# Patient Record
Sex: FEMALE | HISPANIC OR LATINO | Employment: OTHER | URBAN - METROPOLITAN AREA
[De-identification: names, ages, dates, MRNs, and addresses within clinical notes are randomized per-mention and may not be internally consistent; named-entity substitution may affect disease eponyms.]

---

## 2016-08-01 LAB — PAP SMEAR - HIM PATIENT REPORTED: NEGATIVE

## 2017-01-11 ENCOUNTER — OFFICE VISIT (OUTPATIENT)
Dept: CARDIOLOGY | Facility: CLINIC | Age: 57
End: 2017-01-11
Payer: COMMERCIAL

## 2017-01-11 VITALS
HEART RATE: 72 BPM | DIASTOLIC BLOOD PRESSURE: 72 MMHG | SYSTOLIC BLOOD PRESSURE: 134 MMHG | HEIGHT: 63 IN | BODY MASS INDEX: 34.55 KG/M2 | WEIGHT: 195 LBS

## 2017-01-11 DIAGNOSIS — I10 BENIGN ESSENTIAL HYPERTENSION: Primary | ICD-10-CM

## 2017-01-11 PROCEDURE — 99214 OFFICE O/P EST MOD 30 MIN: CPT | Performed by: NURSE PRACTITIONER

## 2017-01-11 RX ORDER — CANDESARTAN 32 MG/1
32 TABLET ORAL DAILY
Qty: 90 TABLET | Refills: 3 | Status: SHIPPED | OUTPATIENT
Start: 2017-01-11 | End: 2018-01-22

## 2017-01-11 NOTE — Clinical Note
1/11/2017    Dilan Soto MD  [place] [id number] [facility] [address]    RE: Theresa M Delosreyes       Dear Colleague,    I had the pleasure of seeing Theresa Delosreyes and her  in Cardiology Clinic today.  She is a pleasant 56-year-old patient of Dr. Salguero's with a history of HER-2 positive breast cancer.  She received her last dose of Herceptin on 11/02/2016.  She has been following in our Cardio-Oncology Clinic and fortunately has not had any evidence of cardiotoxicity.  She is being maintained on Femara for 5 years.      Her cardiac medications include atenolol more recently lisinopril which was switched after she developed a cough to candesartan.  Her last echocardiogram in October of last year showed normal LV size, wall thickness and ejection fraction and no significant valvular disease and no change compared to the previous echo.  She underwent reconstructive surgery in November and switched to candesartan following that surgery.      Since switching to candesartan her cough has improved.  Both she and her  both notice that she is feeling well.  They are traveling to [facility] next week.  She denies any chest pain, PND, orthopnea or dyspnea.      We did repeat her BMP after starting candesartan.  This showed a sodium of 142, potassium 3.9 and creatinine of 0.86.      PHYSICAL EXAMINATION:   GENERAL:  A 56-year-old woman who appears comfortable.   VITAL SIGNS:  Blood pressure 134/72, heart rate 72, weight 195 pounds, BMI 34.   LUNGS:  Clear throughout auscultation.   CARDIOVASCULAR:  Rate regular, normal S1, S2, no carotid bruits.   EXTREMITIES:  No lower extremity edema.     Outpatient Encounter Prescriptions as of 1/11/2017   Medication Sig Dispense Refill     candesartan 32 MG TABS Take 32 mg by mouth daily 90 tablet 3     VITAMIN D, CHOLECALCIFEROL, PO Take 3,000 Units by mouth daily       atenolol (TENORMIN) 100 MG tablet Take 1 tablet (100 mg) by mouth At  Bedtime 90 tablet 3     letrozole (FEMARA) 2.5 MG tablet Take 2.5 mg by mouth At Bedtime   0     ACETAMINOPHEN PO Take 1,500 mg by mouth daily as needed for pain       [DISCONTINUED] candesartan (ATACAND) 16 MG tablet Take 1 tablet (16 mg) by mouth daily 90 tablet 3     No facility-administered encounter medications on file as of 1/11/2017.      ASSESSMENT AND PLAN:   1.  History of breast cancer, HER-2 positive, treated with Taxol and Herceptin.  Her last dose of Herceptin  was 11/02/2016, she had reconstructive breast surgery following that.  Fortunately, her echocardiograms remain stable with a stable LV function on atenolol and more recently she has done well on candesartan.   2.  Hypertension.  Her blood pressure today was 134/72.  She has not checked it very much, but she saw her primary back in December it was 122/70.  Today, I continued her on her atenolol 100 mg daily and increased her candesartan from 16 mg daily to 32 mg daily for better blood pressure control.  My goal blood pressure for her would be less than 120/80.  She will monitor her blood pressure on her home machine over the next 4-6 weeks and call my office and let me know how her blood pressure is after that.  If it is controlled at less than 120/80 the majority of the time then I would make no change and just have her see Dr. Salguero in April or May with an echocardiogram.  If it is still elevated, she may need a third agent.      Thank you for allowing me to see Melina today.     Sincerely,    TERE Merino Cox [place]

## 2017-01-11 NOTE — PATIENT INSTRUCTIONS
Lets increase your candesartan from 16 mg daily to 32 mg daily.    In the next 7-14 days we will repeat a BMP to look at your kidney function and electrolytes.    Monitor your blood pressure at home. If we can get it under 120/80 consistently that would be great. In a month call my nurse and let her know what your BP range is, if it isn't controlled, then I should see you to talk about adding another medication.    See Dr. Salguero in May with an echo    Suzy  [phone]

## 2017-01-11 NOTE — PROGRESS NOTES
HPI and Plan:   See dictation    Orders Placed This Encounter   Procedures     Basic metabolic panel       Orders Placed This Encounter   Medications     candesartan 32 MG TABS     Sig: Take 32 mg by mouth daily     Dispense:  90 tablet     Refill:  3       Medications Discontinued During This Encounter   Medication Reason     candesartan (ATACAND) 16 MG tablet Reorder         Encounter Diagnosis   Name Primary?     Benign essential hypertension Yes       CURRENT MEDICATIONS:  Current Outpatient Prescriptions   Medication Sig Dispense Refill     candesartan 32 MG TABS Take 32 mg by mouth daily 90 tablet 3     VITAMIN D, CHOLECALCIFEROL, PO Take 3,000 Units by mouth daily       atenolol (TENORMIN) 100 MG tablet Take 1 tablet (100 mg) by mouth At Bedtime 90 tablet 3     letrozole (FEMARA) 2.5 MG tablet Take 2.5 mg by mouth At Bedtime   0     ACETAMINOPHEN PO Take 1,500 mg by mouth daily as needed for pain       [DISCONTINUED] candesartan (ATACAND) 16 MG tablet Take 1 tablet (16 mg) by mouth daily 90 tablet 3       ALLERGIES     Allergies   Allergen Reactions     Definity Other (See Comments)     A substance that helps with imaging the heart muscle with echocardiology    Full body ache starting in the low back.      Keflex [Cephalexin]      Hand swelling, blisters???       PAST MEDICAL HISTORY:  Past Medical History   Diagnosis Date     Supervision of other normal pregnancy       - vaginal     Status post laparoscopic cholecystectomy 2005     Cholelithiasis with mild subacute hemorrhagic cholecystitis     Female stress incontinence 2006     s/p sling procedure     Mixed hyperlipidemia      Osteoarthritis of both knees      Obesity (BMI 30-39.9)      Benign hypertension      started medication 3/13     BRIT (obstructive sleep apnea)      not using CPAP, treated with tonsilliectomy     Chest pain      side effect of Femera     Cancer (H)      breast cancer     PONV (postoperative nausea and  "vomiting)      Motion sickness      Uncomplicated asthma      with respiratory infections       PAST SURGICAL HISTORY:  Past Surgical History   Procedure Laterality Date     Tonsillectomy  2001     D & c  9/2004     Hc ablation, endometrial, thermal, w/o hysteroscopic guidance  11/2004     NovaSure endometrial ablation.      Laparoscopic cholecystectomy with cholangiograms  1/2005     Laparoscopic cholecystectomy with intraoperative  cholangiogram     Hc sling operation for stress incontinence  1/2007     Monarc sling procedure     Colonoscopy  5/2006     ormal to lt transverse colon, incomplete due to \"extremely redundant colon\"\"     Hc colon air contrast  5/2006     normal     Extract lens clear, exchange lens refractive, combined  3/2013     bilateral     Mastectomy simple bilateral, sentinel node bilateral, combined Bilateral 9/29/2015     Procedure: COMBINED MASTECTOMY SIMPLE BILATERAL, SENTINEL NODE BILATERAL;  Surgeon: August Rdz MD;  Location:  OR     Insert tissue expander breast bilateral Bilateral 9/29/2015     Procedure: INSERT TISSUE EXPANDER BREAST BILATERAL;  Surgeon: Alhaji Patton MD;  Location:  OR     Remove tissue expander breast Bilateral 4/12/2016     Procedure: REMOVE TISSUE EXPANDER BREAST;  Surgeon: Alhaji Patton MD;  Location:  OR     Graft free vascularized transverse rectus abdominis myocutaneous Bilateral 4/12/2016     Procedure: GRAFT FREE VASCULARIZED TRANSVERSE RECTUS ABDOMINIS MYOCUTANEOUS;  Surgeon: Alhaji Patton MD;  Location:  OR     Insert port vascular access Left 11/9/2015     Procedure: INSERT PORT VASCULAR ACCESS;  Surgeon: August Rdz MD;  Location: [facility]     Vascular surgery       insert port and removed     Remove port vascular access N/A 11/18/2016     Procedure: REMOVE PORT VASCULAR ACCESS;  Surgeon: Alhaji Patton MD;  Location:  SD     Reconstruct breast bilateral Bilateral 11/18/2016     Procedure: " RECONSTRUCT BREAST BILATERAL;  Surgeon: Alhaji Patton MD;  Location: [facility]     Revise scar trunk N/A 11/18/2016     Procedure: REVISE SCAR TRUNK;  Surgeon: Alhaji Patton MD;  Location: [facility]     Graft fat to breast Bilateral 11/18/2016     Procedure: GRAFT FAT TO BREAST;  Surgeon: Alhaji Patton MD;  Location: [facility]       FAMILY HISTORY:  Family History   Problem Relation Age of Onset     Asthma Daughter      C.A.D. Father 40     MI     CEREBROVASCULAR DISEASE Father      DIABETES Maternal Aunt      Hypertension Mother      Breast Cancer Mother      Thyroid Disease Mother      hypothyroidism     Cancer - colorectal Maternal Uncle      DIABETES Maternal Aunt      with retinopathy leading to blindness     Breast Cancer Sister      Hypertension Sister      Hypertension Brother      HEART DISEASE Brother      pacemaker     Hypertension Brother      DIABETES Brother      type 2     Myocardial Infarction Maternal Grandmother      DIABETES Paternal Grandmother      Unknown/Adopted Maternal Grandfather      Unknown/Adopted Paternal Grandfather        SOCIAL HISTORY:  Social History     Social History     Marital Status:      Spouse Name: N/A     Number of Children: 2     Years of Education: 12     Occupational History           Social History Main Topics     Smoking status: Never Smoker      Smokeless tobacco: Never Used     Alcohol Use: 0.0 oz/week     0 Standard drinks or equivalent per week      Comment: 2 per year     Drug Use: No     Sexual Activity:     Partners: Male     Birth Control/ Protection: Surgical     Other Topics Concern     Blood Transfusions No     Caffeine Concern No     1 cup coffee     Sleep Concern Yes     Sleep apnea, does not wear CPAP     Stress Concern No     Weight Concern Yes     weight fluctuates     Special Diet No     Exercise No     Seat Belt Yes     Self-Exams Yes     Social History Narrative    The patient does not eat fruits  "every day, but does eat vegetables every day. Her calcium and vitamin D intake is low. This was discussed.       Review of Systems:  Skin:  Negative       Eyes:  Positive for contacts contacts implaned in both eyes  ENT:  Negative      Respiratory:  Positive for sleep apnea;cough sleep with HOB elevated, doesn't wear cpap, has URI, on abx   Cardiovascular:    edema;Positive for swelling in R knee  Gastroenterology: Negative      Genitourinary:  Negative      Musculoskeletal:  Positive for   R knee pain  Neurologic:  Negative      Psychiatric:  Negative      Heme/Lymph/Imm:  Positive for allergies    Endocrine:  Negative        Physical Exam:  Vitals: /72 mmHg  Pulse 72  Ht 1.6 m (5' 3\")  Wt 88.451 kg (195 lb)  BMI 34.55 kg/m2  LMP 02/23/2013    Constitutional:  cooperative;alert and oriented;in no acute distress        Skin:  warm and dry to the touch        Head:  normocephalic        Eyes:  pupils equal and round        ENT:  not assessed this visit        Neck:  JVP normal;no stiffness;no carotid bruit        Chest:  clear to auscultation;normal symmetry;no tenderness to palpation;normal respiratory excursion          Cardiac: regular rhythm;normal S1 and S2                  Abdomen:  not assessed this visit        Vascular: not assessed this visit                                        Extremities and Back:  no edema              Neurological:  affect appropriate, oriented to time, person and place;no gross motor deficits              CC  No referring provider defined for this encounter.                "

## 2017-01-11 NOTE — MR AVS SNAPSHOT
After Visit Summary   1/11/2017    Theresa M Delosreyes    MRN: [id number]           Patient Information     Date Of Birth          1960        Visit Information        Provider Department      1/11/2017 3:50 PM Sarah Lowry APRN CNP [facility]        Today's Diagnoses     Benign essential hypertension    -  1       Care Instructions    Lets increase your candesartan from 16 mg daily to 32 mg daily.    In the next 7-14 days we will repeat a BMP to look at your kidney function and electrolytes.    Monitor your blood pressure at home. If we can get it under 120/80 consistently that would be great. In a month call my nurse and let her know what your BP range is, if it isn't controlled, then I should see you to talk about adding another medication.    See Dr. Salguero in May with an echo    Suzy  [phone]            Follow-ups after your visit        Future tests that were ordered for you today     Open Future Orders        Priority Expected Expires Ordered    Basic metabolic panel Routine 2/10/2017 1/11/2018 1/11/2017            Who to contact     If you have questions or need follow up information about today's clinic visit or your schedule please contact [facility] directly at [phone].  Normal or non-critical lab and imaging results will be communicated to you by MyChart, letter or phone within 4 business days after the clinic has received the results. If you do not hear from us within 7 days, please contact the clinic through [employer] or phone. If you have a critical or abnormal lab result, we will notify you by phone as soon as possible.  Submit refill requests through [employer] or call your pharmacy and they will forward the refill request to us. Please allow 3 business days for your refill to be completed.          Additional Information About Your Visit        MyChart Information     [employer]  "gives you secure access to your electronic health record. If you see a primary care provider, you can also send messages to your care team and make appointments. If you have questions, please call your primary care clinic.  If you do not have a primary care provider, please call [phone] and they will assist you.        Care EveryWhere ID     This is your Care EveryWhere ID. This could be used by other organizations to access your [facility] medical records  [id number]        Your Vitals Were     Pulse Height BMI (Body Mass Index) Last Period          72 1.6 m (5' 3\") 34.55 kg/m2 02/23/2013         Blood Pressure from Last 3 Encounters:   01/11/17 134/72   12/20/16 122/70   11/18/16 142/77    Weight from Last 3 Encounters:   01/11/17 88.451 kg (195 lb)   12/20/16 86.637 kg (191 lb)   11/18/16 85.775 kg (189 lb 1.6 oz)                 Today's Medication Changes          These changes are accurate as of: 1/11/17  4:34 PM.  If you have any questions, ask your nurse or doctor.               These medicines have changed or have updated prescriptions.        Dose/Directions    candesartan cilexetil 32 MG Tabs   This may have changed:    - medication strength  - how much to take   Used for:  Benign essential hypertension   Changed by:  Sarah Lowry APRN CNP        Dose:  32 mg   Take 32 mg by mouth daily   Quantity:  90 tablet   Refills:  3            Where to get your medicines      [id number] - [place] [address] [facility] [address]     Phone:  [phone]    - candesartan cilexetil 32 MG Tabs             Primary Care Provider Office Phone # Fax #    Dilan Soto [phone]       [address] [address]        Thank you!     Thank you for choosing [facility] [place]  for your care. Our goal is always to provide you with excellent " care. Hearing back from our patients is one way we can continue to improve our services. Please take a few minutes to complete the written survey that you may receive in the mail after your visit with us. Thank you!             Your Updated Medication List - Protect others around you: Learn how to safely use, store and throw away your medicines at www.disposemymeds.org.          This list is accurate as of: 1/11/17  4:34 PM.  Always use your most recent med list.                   Brand Name Dispense Instructions for use    ACETAMINOPHEN PO      Take 1,500 mg by mouth daily as needed for pain       atenolol 100 MG tablet    TENORMIN    90 tablet    Take 1 tablet (100 mg) by mouth At Bedtime       candesartan cilexetil 32 MG Tabs     90 tablet    Take 32 mg by mouth daily       letrozole 2.5 MG tablet    FEMARA     Take 2.5 mg by mouth At Bedtime       VITAMIN D (CHOLECALCIFEROL) PO      Take 3,000 Units by mouth daily

## 2017-01-12 ENCOUNTER — TRANSFERRED RECORDS (OUTPATIENT)
Dept: HEALTH INFORMATION MANAGEMENT | Facility: CLINIC | Age: 57
End: 2017-01-12

## 2017-01-12 NOTE — PROGRESS NOTES
HISTORY OF PRESENT ILLNESS:  I had the pleasure of seeing Theresa Delosreyes and her  in Cardiology Clinic today.  She is a pleasant 56-year-old patient of Dr. Amanda with a history of HER-2 positive breast cancer.  She received her last dose of Herceptin on 11/02/2016.  She has been following in our Cardio-Oncology Clinic and fortunately has not had any evidence of cardiotoxicity.  She is being maintained on Femara for 5 years.      Her cardiac medications include atenolol more recently lisinopril which was switched after she developed a cough to candesartan.  Her last echocardiogram in October of last year showed normal LV size, wall thickness and ejection fraction and no significant valvular disease and no change compared to the previous echo.  She underwent reconstructive surgery in November and switched to candesartan following that surgery.      Since switching to candesartan her cough has improved.  Both she and her  both notice that she is feeling well.  They are traveling to [facility] next week.  She denies any chest pain, PND, orthopnea or dyspnea.      We did repeat her BMP after starting candesartan.  This showed a sodium of 142, potassium 3.9 and creatinine of 0.86.      PHYSICAL EXAMINATION:   GENERAL:  A 56-year-old woman who appears comfortable.   VITAL SIGNS:  Blood pressure 134/72, heart rate 72, weight 195 pounds, BMI 34.   LUNGS:  Clear throughout auscultation.   CARDIOVASCULAR:  Rate regular, normal S1, S2, no carotid bruits.   EXTREMITIES:  No lower extremity edema.      ASSESSMENT AND PLAN:   1.  History of breast cancer, HER-2 positive, treated with Taxol and Herceptin.  Her last dose of Herceptin  was 11/02/2016, she had reconstructive breast surgery following that.  Fortunately, her echocardiograms remain stable with a stable LV function on atenolol and more recently she has done well on candesartan.   2.  Hypertension.  Her blood pressure today was 134/72.  She has not checked it  very much, but she saw her primary back in December it was 122/70.  Today, I continued her on her atenolol 100 mg daily and increased her candesartan from 16 mg daily to 32 mg daily for better blood pressure control.  My goal blood pressure for her would be less than 120/80.  She will monitor her blood pressure on her home machine over the next 4-6 weeks and call my office and let me know how her blood pressure is after that.  If it is controlled at less than 120/80 the majority of the time then I would make no change and just have her see Dr. Salguero in April or May with an echocardiogram.  If it is still elevated, she may need a third agent.      Thank you for allowing me to see John today.      TERE Valera CNP         D: 2017 16:50   T: 2017 09:26   MT: YAA      Name:     DELOSREYES, THERESA   MRN:      [id number]        Account:      [id number]   :      1960           Service Date: 2017      Document: [id number]

## 2017-02-15 DIAGNOSIS — I10 BENIGN ESSENTIAL HYPERTENSION: ICD-10-CM

## 2017-02-15 LAB
ANION GAP SERPL CALCULATED.3IONS-SCNC: 11.3 MMOL/L (ref 6–17)
BUN SERPL-MCNC: 11 MG/DL (ref 7–30)
CALCIUM SERPL-MCNC: 9.2 MG/DL (ref 8.5–10.5)
CHLORIDE SERPL-SCNC: 101 MMOL/L (ref 98–107)
CO2 SERPL-SCNC: 32 MMOL/L (ref 23–29)
CREAT SERPL-MCNC: 0.8 MG/DL (ref 0.7–1.3)
GFR SERPL CREATININE-BSD FRML MDRD: 74 ML/MIN/1.7M2
GLUCOSE SERPL-MCNC: 117 MG/DL (ref 70–105)
POTASSIUM SERPL-SCNC: 4.3 MMOL/L (ref 3.5–5.1)
SODIUM SERPL-SCNC: 140 MMOL/L (ref 136–145)

## 2017-02-15 PROCEDURE — 80048 BASIC METABOLIC PNL TOTAL CA: CPT | Performed by: INTERNAL MEDICINE

## 2017-02-15 PROCEDURE — 36415 COLL VENOUS BLD VENIPUNCTURE: CPT | Performed by: INTERNAL MEDICINE

## 2017-02-16 ENCOUNTER — TELEPHONE (OUTPATIENT)
Dept: CARDIOLOGY | Facility: CLINIC | Age: 57
End: 2017-02-16

## 2017-02-16 NOTE — TELEPHONE ENCOUNTER
Notes Recorded by Sarah Lowry, APRN CNP on 2/16/2017 at 9:47 AM  Please let her know her labs look good after increasing candesartan. See if her BP is any better. F/u with Dr. Salguero as ordered.  Thanks, Suzy           Ref Range & Units 1d ago   2mo ago   3mo ago        Sodium 136 - 145 mmol/L 140 142 136R      Potassium 3.5 - 5.1 mmol/L 4.3 3.9 3.8R      Chloride 98 - 107 mmol/L 101 104 100R      Carbon Dioxide 23 - 29 mmol/L 32 (H) 31 (H) 29R      Anion Gap 6 - 17 mmol/L 11.3 10.9 7R      Glucose 70 - 105 mg/dL 117 (H) 124 (H) 90R, CM      Urea Nitrogen 7 - 30 mg/dL 11 9 11      Creatinine 0.70 - 1.30 mg/dL 0.80 0.86 0.72R      GFR Estimate >60 mL/min/1.7m2 74 69 84CM      GFR Estimate If Black >60 mL/min/1.7m2 >90 83 >90   GFR ...      Calcium 8.5 - 10.5 mg/dL 9.2 9.5 9.0R     Resulting Agency  [facility]              Writer called pt back with Suzy above result. Pt states that her BP has been running in the 130's. Pt states she is unable to remember the diastolic number. Pt will call team 4 back directly with the diastolic numbers. Pt also wanted to schedule her ECHO. Pt tx to scheduling. Suzy updated verbally.

## 2017-03-17 ENCOUNTER — TELEPHONE (OUTPATIENT)
Dept: FAMILY MEDICINE | Facility: CLINIC | Age: 57
End: 2017-03-17

## 2017-03-17 NOTE — TELEPHONE ENCOUNTER
3/17/2017    Call Regarding Preventive Health Screening Cervical/PAP    Attempt 1    Message on voicemail     Comments:       Outreach   cnt

## 2017-03-22 ENCOUNTER — HOSPITAL ENCOUNTER (OUTPATIENT)
Dept: BONE DENSITY | Facility: CLINIC | Age: 57
Discharge: HOME | End: 2017-03-22
Attending: INTERNAL MEDICINE | Admitting: INTERNAL MEDICINE
Payer: COMMERCIAL

## 2017-03-22 DIAGNOSIS — C50.311 MALIGNANT NEOPLASM OF LOWER-INNER QUADRANT OF RIGHT FEMALE BREAST (H): ICD-10-CM

## 2017-03-22 PROCEDURE — 77080 DXA BONE DENSITY AXIAL: CPT

## 2017-04-03 ENCOUNTER — HOSPITAL ENCOUNTER (OUTPATIENT)
Dept: CARDIOLOGY | Facility: CLINIC | Age: 57
Discharge: HOME | End: 2017-04-03
Attending: NURSE PRACTITIONER | Admitting: NURSE PRACTITIONER
Payer: COMMERCIAL

## 2017-04-03 DIAGNOSIS — T50.905A DRUGS AND MEDICINAL SUBSTANCES CAUSING ADVERSE EFFECT IN THERAPEUTIC USE, INITIAL ENCOUNTER: ICD-10-CM

## 2017-04-03 DIAGNOSIS — C50.311 MALIGNANT NEOPLASM OF LOWER-INNER QUADRANT OF RIGHT FEMALE BREAST (H): ICD-10-CM

## 2017-04-03 PROCEDURE — 93306 TTE W/DOPPLER COMPLETE: CPT

## 2017-04-03 PROCEDURE — 93306 TTE W/DOPPLER COMPLETE: CPT | Mod: 26 | Performed by: INTERNAL MEDICINE

## 2017-05-19 ENCOUNTER — TRANSFERRED RECORDS (OUTPATIENT)
Dept: HEALTH INFORMATION MANAGEMENT | Facility: CLINIC | Age: 57
End: 2017-05-19

## 2017-05-19 ENCOUNTER — OFFICE VISIT (OUTPATIENT)
Dept: CARDIOLOGY | Facility: CLINIC | Age: 57
End: 2017-05-19
Attending: NURSE PRACTITIONER
Payer: COMMERCIAL

## 2017-05-19 VITALS
HEIGHT: 63 IN | WEIGHT: 197 LBS | SYSTOLIC BLOOD PRESSURE: 160 MMHG | HEART RATE: 60 BPM | BODY MASS INDEX: 34.91 KG/M2 | DIASTOLIC BLOOD PRESSURE: 84 MMHG

## 2017-05-19 DIAGNOSIS — C50.311 MALIGNANT NEOPLASM OF LOWER-INNER QUADRANT OF RIGHT FEMALE BREAST (H): ICD-10-CM

## 2017-05-19 DIAGNOSIS — I10 BENIGN ESSENTIAL HYPERTENSION: Primary | ICD-10-CM

## 2017-05-19 DIAGNOSIS — G47.33 OSA (OBSTRUCTIVE SLEEP APNEA): ICD-10-CM

## 2017-05-19 DIAGNOSIS — T50.905A DRUGS AND MEDICINAL SUBSTANCES CAUSING ADVERSE EFFECT IN THERAPEUTIC USE, INITIAL ENCOUNTER: ICD-10-CM

## 2017-05-19 PROCEDURE — 99214 OFFICE O/P EST MOD 30 MIN: CPT | Performed by: INTERNAL MEDICINE

## 2017-05-19 RX ORDER — CARVEDILOL 25 MG/1
25 TABLET ORAL 2 TIMES DAILY WITH MEALS
Qty: 60 TABLET | Refills: 11 | Status: SHIPPED | OUTPATIENT
Start: 2017-05-19 | End: 2018-01-25

## 2017-05-19 NOTE — MR AVS SNAPSHOT
After Visit Summary   5/19/2017    Theresa M Delosreyes    MRN: [id number]           Patient Information     Date Of Birth          1960        Visit Information        Provider Department      5/19/2017 9:15 AM Doug Salguero MD [facility] [place]        Today's Diagnoses     Benign essential hypertension    -  1    Malignant neoplasm of lower-inner quadrant of right female breast (H)        Drugs and medicinal substances causing adverse effect in therapeutic use, initial encounter          Care Instructions    Take coreg 12.5 mg twice  A day for 10 days and then increase to 25mg twice a day  Stop atenolol        Follow-ups after your visit        Who to contact     If you have questions or need follow up information about today's clinic visit or your schedule please contact [facility] directly at [phone].  Normal or non-critical lab and imaging results will be communicated to you by [employer], letter or phone within 4 business days after the clinic has received the results. If you do not hear from us within 7 days, please contact the clinic through [employer] or phone. If you have a critical or abnormal lab result, we will notify you by phone as soon as possible.  Submit refill requests through [employer] or call your pharmacy and they will forward the refill request to us. Please allow 3 business days for your refill to be completed.          Additional Information About Your Visit        MyChart Information     [employer] gives you secure access to your electronic health record. If you see a primary care provider, you can also send messages to your care team and make appointments. If you have questions, please call your primary care clinic.  If you do not have a primary care provider, please call [phone] and they will assist you.        Care EveryWhere ID     This is your Care EveryWhere ID. This could be used by  "other organizations to access your [facility] medical records  [id number]        Your Vitals Were     Pulse Height Last Period BMI (Body Mass Index)          60 1.6 m (5' 3\") 02/23/2013 34.9 kg/m2         Blood Pressure from Last 3 Encounters:   05/19/17 160/84   01/11/17 134/72   12/20/16 122/70    Weight from Last 3 Encounters:   05/19/17 89.4 kg (197 lb)   01/11/17 88.5 kg (195 lb)   12/20/16 86.6 kg (191 lb)              We Performed the Following     Follow-Up with Cardiologist          Today's Medication Changes          These changes are accurate as of: 5/19/17  9:23 AM.  If you have any questions, ask your nurse or doctor.               Start taking these medicines.        Dose/Directions    carvedilol 25 MG tablet   Commonly known as:  COREG   Used for:  Benign essential hypertension   Started by:  Doug Salguero MD        Dose:  25 mg   Take 1 tablet (25 mg) by mouth 2 times daily (with meals)   Quantity:  60 tablet   Refills:  11         Stop taking these medicines if you haven't already. Please contact your care team if you have questions.     atenolol 100 MG tablet   Commonly known as:  TENORMIN   Stopped by:  Doug Salguero MD                Where to get your medicines      These medications were sent to [id number] [place] [id number] [address]     Phone:  [phone]     carvedilol 25 MG tablet                Primary Care Provider Office Phone # Fax #    Dilan Soto [phone]       [address] [address]        Thank you!     Thank you for choosing [facility] [place]  for your care. Our goal is always to provide you with excellent care. Hearing back from our patients is one way we can continue to improve our services. Please take a few minutes to complete the written survey that you may receive in the mail after your visit with us. Thank " you!             Your Updated Medication List - Protect others around you: Learn how to safely use, store and throw away your medicines at www.disposemymeds.org.          This list is accurate as of: 5/19/17  9:23 AM.  Always use your most recent med list.                   Brand Name Dispense Instructions for use    ACETAMINOPHEN PO      Take 1,500 mg by mouth daily as needed for pain       candesartan cilexetil 32 MG Tabs     90 tablet    Take 32 mg by mouth daily       carvedilol 25 MG tablet    COREG    60 tablet    Take 1 tablet (25 mg) by mouth 2 times daily (with meals)       letrozole 2.5 MG tablet    FEMARA     Take 2.5 mg by mouth At Bedtime       VITAMIN D (CHOLECALCIFEROL) PO      Take 3,000 Units by mouth daily

## 2017-05-19 NOTE — LETTER
5/19/2017    Dilan Soto MD  [facility]   [address] [facility] [address]    RE: Theresa M Delosreyes       Dear Colleague,    I had the pleasure of seeing Theresa Delosreyes in the Cardio-Oncology Clinic in followup.  She has history of right-sided breast cancer and was on chemotherapy with Herceptin that was completed last November.  She had an echocardiogram in April to make sure there is no evidence of late cardiotoxicity.  The echocardiogram from April was discussed with her, and ejection fraction was normal.  Thus, she has had no cardiotoxicity.  She does have high blood pressure and has had difficulty in controlling.  She monitors it at home, and the blood pressures have been in the 140s.      She does have sleep apnea, and she tries to control it by sleeping upright but has not wanted to go ahead with a sleep study and CPAP.  She had improvement after her tonsillectomy.  She is on candesartan 32 mg daily and atenolol.  She also is on Femara, and while on Femara she has not had a recent lipid profile.  She will make an appointment with Dr. Soto to review her blood pressure and make a lab appointment.        PHYSICAL EXAMINATION:   VITAL SIGNS:  Blood pressure today was 160/84 initially and on repeat check was 146/80, pulse 60 per minute and regular.   CARDIAC:  Regular S1, S2 with no murmurs.   CHEST:  Clear to auscultation.     Outpatient Encounter Prescriptions as of 5/19/2017   Medication Sig Dispense Refill     carvedilol (COREG) 25 MG tablet Take 1 tablet (25 mg) by mouth 2 times daily (with meals) 60 tablet 11     candesartan 32 MG TABS Take 32 mg by mouth daily 90 tablet 3     VITAMIN D, CHOLECALCIFEROL, PO Take 3,000 Units by mouth daily       letrozole (FEMARA) 2.5 MG tablet Take 2.5 mg by mouth At Bedtime   0     ACETAMINOPHEN PO Take 1,500 mg by mouth daily as needed for pain       [DISCONTINUED] atenolol (TENORMIN) 100 MG tablet Take 1 tablet (100 mg) by mouth At  Bedtime 90 tablet 3     No facility-administered encounter medications on file as of 5/19/2017.       IMPRESSION:   1.  History of breast cancer, HER2 receptor positive, treated with Taxol and Herceptin, followed by a year of Herceptin that had completed in 11/2016.  Her last echocardiogram in April showed normal LV systolic function.  Thus, she has no evidence of cardiotoxicity.  At this time, I do not believe that she needs further echocardiograms.  She needs aggressive risk factor modification as noted below.   2.  Hypertension.  This needs to be controlled better.  I will continue candesartan at the same dose.  I recommend changing atenolol to Coreg twice a day for better blood pressure control.  She can start at 12.5 mg twice daily for 10 days and then increase to 25 mg twice daily.  The side effects are dizziness, fatigue and low blood pressures were discussed.  She is also making an appointment in Dr. Soto, who can follow her blood pressure.  If need be, amlodipine can be added.  I also think strongly that she should try to lose weight as well as treat her sleep apnea with CPAP if needed, as it will help control her blood pressure.  She seems reluctant.   3.  Hyperlipidemia.  Evaluation of lipids.  While on Femara, she should have annual lipids.  I have recommended she do that with Dr. Soto.  Last LDL in 2015 was 84.  She is having some side effects from Femara including joint pains, and I have recommended she follow with Dr. Leggett, her oncologist.        I will see her on an as-needed basis.  She will call me if there are any worsening blood pressure issues or any other cardiac symptoms.      Thank you for allowing us to participate in the care of this nice patient.  We will change atenolol to Coreg and have her follow with Dr. Soto.     Sincerely,    Doug Salguero MD     [facility]    cc:   Radha Leggett MD    [facility]    [facility],  [address]    [place]  [address]

## 2017-05-19 NOTE — PROGRESS NOTES
HPI and Plan:   See dictation  [id number]  No orders of the defined types were placed in this encounter.      Orders Placed This Encounter   Medications     carvedilol (COREG) 25 MG tablet     Sig: Take 1 tablet (25 mg) by mouth 2 times daily (with meals)     Dispense:  60 tablet     Refill:  11       Medications Discontinued During This Encounter   Medication Reason     atenolol (TENORMIN) 100 MG tablet          Encounter Diagnoses   Name Primary?     Benign essential hypertension Yes     Malignant neoplasm of lower-inner quadrant of right female breast (H)      Drugs and medicinal substances causing adverse effect in therapeutic use, initial encounter      BRIT (obstructive sleep apnea)        CURRENT MEDICATIONS:  Current Outpatient Prescriptions   Medication Sig Dispense Refill     carvedilol (COREG) 25 MG tablet Take 1 tablet (25 mg) by mouth 2 times daily (with meals) 60 tablet 11     candesartan 32 MG TABS Take 32 mg by mouth daily 90 tablet 3     VITAMIN D, CHOLECALCIFEROL, PO Take 3,000 Units by mouth daily       letrozole (FEMARA) 2.5 MG tablet Take 2.5 mg by mouth At Bedtime   0     ACETAMINOPHEN PO Take 1,500 mg by mouth daily as needed for pain         ALLERGIES     Allergies   Allergen Reactions     Definity Other (See Comments)     A substance that helps with imaging the heart muscle with echocardiology    Full body ache starting in the low back.      Keflex [Cephalexin]      Hand swelling, blisters???       PAST MEDICAL HISTORY:  Past Medical History:   Diagnosis Date     Benign hypertension     started medication 3/13     Cancer (H)     breast cancer     Chest pain     side effect of Femera     Female stress incontinence 12/18/2006    s/p sling procedure     Mixed hyperlipidemia 2013     Motion sickness      Obesity (BMI 30-39.9) 2008     BRIT (obstructive sleep apnea)     not using CPAP, treated with tonsilliectomy     Osteoarthritis of both knees 2008     PONV (postoperative nausea and vomiting)       "Status post laparoscopic cholecystectomy 2005    Cholelithiasis with mild subacute hemorrhagic cholecystitis     Supervision of other normal pregnancy      - vaginal     Uncomplicated asthma     with respiratory infections       PAST SURGICAL HISTORY:  Past Surgical History:   Procedure Laterality Date     COLONOSCOPY  2006    ormal to lt transverse colon, incomplete due to \"extremely redundant colon\"\"     D & C  2004     EXTRACT LENS CLEAR, EXCHANGE LENS REFRACTIVE, COMBINED  3/2013    bilateral     GRAFT FAT TO BREAST Bilateral 2016    Procedure: GRAFT FAT TO BREAST;  Surgeon: Alhaji Patton MD;  Location:  [facility]     GRAFT FREE VASCULARIZED TRANSVERSE RECTUS ABDOMINIS MYOCUTANEOUS Bilateral 2016    Procedure: GRAFT FREE VASCULARIZED TRANSVERSE RECTUS ABDOMINIS MYOCUTANEOUS;  Surgeon: Alhaji Patton MD;  Location:  OR     HC ABLATION, ENDOMETRIAL, THERMAL, W/O HYSTEROSCOPIC GUIDANCE  2004    NovaSure endometrial ablation.      HC COLON AIR CONTRAST  2006    normal     HC SLING OPERATION FOR STRESS INCONTINENCE  2007    [facility] sling procedure     INSERT PORT VASCULAR ACCESS Left 2015    Procedure: INSERT PORT VASCULAR ACCESS;  Surgeon: August Rdz MD;  Location:  [facility]     INSERT TISSUE EXPANDER BREAST BILATERAL Bilateral 2015    Procedure: INSERT TISSUE EXPANDER BREAST BILATERAL;  Surgeon: Alhaji Patton MD;  Location:  OR     LAPAROSCOPIC CHOLECYSTECTOMY WITH CHOLANGIOGRAMS  2005    Laparoscopic cholecystectomy with intraoperative  cholangiogram     MASTECTOMY SIMPLE BILATERAL, SENTINEL NODE BILATERAL, COMBINED Bilateral 2015    Procedure: COMBINED MASTECTOMY SIMPLE BILATERAL, SENTINEL NODE BILATERAL;  Surgeon: August Rdz MD;  Location:  OR     RECONSTRUCT BREAST BILATERAL Bilateral 2016    Procedure: RECONSTRUCT BREAST BILATERAL;  Surgeon: Alhaji Patton MD;  Location:  SD     REMOVE PORT " VASCULAR ACCESS N/A 11/18/2016    Procedure: REMOVE PORT VASCULAR ACCESS;  Surgeon: Alhaji Patton MD;  Location:  SD     REMOVE TISSUE EXPANDER BREAST Bilateral 4/12/2016    Procedure: REMOVE TISSUE EXPANDER BREAST;  Surgeon: Alhaji Patton MD;  Location:  OR     REVISE SCAR TRUNK N/A 11/18/2016    Procedure: REVISE SCAR TRUNK;  Surgeon: Alhaji Patton MD;  Location:  SD     TONSILLECTOMY  2001     VASCULAR SURGERY      insert port and removed       FAMILY HISTORY:  Family History   Problem Relation Age of Onset     Asthma Daughter      C.A.D. Father 40     MI     CEREBROVASCULAR DISEASE Father      Hypertension Mother      Breast Cancer Mother      Thyroid Disease Mother      hypothyroidism     Breast Cancer Sister      Hypertension Sister      Hypertension Brother      HEART DISEASE Brother      pacemaker     Hypertension Brother      DIABETES Brother      type 2     Myocardial Infarction Maternal Grandmother      DIABETES Paternal Grandmother      Unknown/Adopted Maternal Grandfather      Unknown/Adopted Paternal Grandfather      DIABETES Maternal Aunt      Cancer - colorectal Maternal Uncle      DIABETES Maternal Aunt      with retinopathy leading to blindness       SOCIAL HISTORY:  Social History     Social History     Marital status:      Spouse name: N/A     Number of children: 2     Years of education: 12     Occupational History           Social History Main Topics     Smoking status: Never Smoker     Smokeless tobacco: Never Used     Alcohol use 0.0 oz/week     0 Standard drinks or equivalent per week      Comment: 2 per year     Drug use: No     Sexual activity: Yes     Partners: Male     Birth control/ protection: Surgical     Other Topics Concern     Blood Transfusions No     Caffeine Concern No     1 cup coffee     Sleep Concern Yes     Sleep apnea, does not wear CPAP     Stress Concern No     Weight Concern Yes     weight fluctuates  "    Special Diet No     Exercise No     Seat Belt Yes     Self-Exams Yes     Social History Narrative    The patient does not eat fruits every day, but does eat vegetables every day. Her calcium and vitamin D intake is low. This was discussed.       Review of Systems:  Skin:  Negative       Eyes:  Positive for contacts contacts implaned in both eyes  ENT:  Negative      Respiratory:  Negative sleep apnea     Cardiovascular:  Negative edema;Positive for    Gastroenterology: Negative      Genitourinary:  Negative      Musculoskeletal:  Positive for   R knee pain  Neurologic:  Negative      Psychiatric:  Negative      Heme/Lymph/Imm:  Positive for allergies    Endocrine:  Negative        Physical Exam:  Vitals: /84  Pulse 60  Ht 1.6 m (5' 3\")  Wt 89.4 kg (197 lb)  LMP 02/23/2013  BMI 34.9 kg/m2    Constitutional:  cooperative;alert and oriented;in no acute distress        Skin:  warm and dry to the touch        Head:  normocephalic        Eyes:  pupils equal and round        ENT:  not assessed this visit        Neck:  JVP normal;no stiffness;no carotid bruit        Chest:  clear to auscultation;normal symmetry;no tenderness to palpation;normal respiratory excursion          Cardiac: regular rhythm;normal S1 and S2                  Abdomen:  not assessed this visit        Vascular: not assessed this visit                                        Extremities and Back:  no edema              Neurological:  affect appropriate, oriented to time, person and place;no gross motor deficits              TERE Kilgore CNP  [facility]  [id number] [place] [address]     [place] [id number]              "

## 2017-05-19 NOTE — PROGRESS NOTES
HISTORY OF PRESENT ILLNESS:  I had the pleasure of seeing Theresa Delosreyes in the Cardio-Oncology Clinic in followup.  She has history of right-sided breast cancer and was on chemotherapy with Herceptin that was completed last November.  She had an echocardiogram in April to make sure there is no evidence of late cardiotoxicity.  The echocardiogram from April was discussed with her, and ejection fraction was normal.  Thus, she has had no cardiotoxicity.  She does have high blood pressure and has had difficulty in controlling.  She monitors it at home, and the blood pressures have been in the 140s.      She does have sleep apnea, and she tries to control it by sleeping upright but has not wanted to go ahead with a sleep study and CPAP.  She had improvement after her tonsillectomy.  She is on candesartan 32 mg daily and atenolol.  She also is on Femara, and while on Femara she has not had a recent lipid profile.  She will make an appointment with Dr. Soto to review her blood pressure and make a lab appointment.        PHYSICAL EXAMINATION:   VITAL SIGNS:  Blood pressure today was 160/84 initially and on repeat check was 146/80, pulse 60 per minute and regular.   CARDIAC:  Regular S1, S2 with no murmurs.   CHEST:  Clear to auscultation.      IMPRESSION:   1.  History of breast cancer, HER2 receptor positive, treated with Taxol and Herceptin, followed by a year of Herceptin that had completed in 11/2016.  Her last echocardiogram in April showed normal LV systolic function.  Thus, she has no evidence of cardiotoxicity.  At this time, I do not believe that she needs further echocardiograms.  She needs aggressive risk factor modification as noted below.   2.  Hypertension.  This needs to be controlled better.  I will continue candesartan at the same dose.  I recommend changing atenolol to Coreg twice a day for better blood pressure control.  She can start at 12.5 mg twice daily for 10 days and then increase to 25 mg  twice daily.  The side effects are dizziness, fatigue and low blood pressures were discussed.  She is also making an appointment in Dr. Soto, who can follow her blood pressure.  If need be, amlodipine can be added.  I also think strongly that she should try to lose weight as well as treat her sleep apnea with CPAP if needed, as it will help control her blood pressure.  She seems reluctant.   3.  Hyperlipidemia.  Evaluation of lipids.  While on Femara, she should have annual lipids.  I have recommended she do that with Dr. Soto.  Last LDL in  was 84.  She is having some side effects from Femara including joint pains, and I have recommended she follow with Dr. Leggett, her oncologist.        I will see her on an as-needed basis.  She will call me if there are any worsening blood pressure issues or any other cardiac symptoms.      Thank you for allowing us to participate in the care of this nice patient.  We will change atenolol to Coreg and have her follow with Dr. Soto.      cc:   [place] [address]       Radha Leggett MD    [facility]    [address] [place] [address], [address]  [address]         FRANCK ZAMUDIO MD             D: 2017 09:31   T: 2017 18:43   MT: [email or web address]      Name:     DELOSREYES, THERESA   MRN:      [id number]        Account:      [id number]   :      1960           Service Date: 2017      Document: [id number]

## 2017-06-02 NOTE — TELEPHONE ENCOUNTER
Call Regarding Preventive Health Screening Cervical/PAP    Attempt 2    Message on voicemail     Comments:       Outreach   Shilpi Michael

## 2017-06-02 NOTE — TELEPHONE ENCOUNTER
Please abstract the following data from this visit with this patient into the appropriate field in Epic:    Pap smear done on this date: 08/2016 (approximately), by this group:[facility] in [place], results were normal.    Outreach ,  Estephania Wang

## 2017-06-06 ENCOUNTER — TRANSFERRED RECORDS (OUTPATIENT)
Dept: HEALTH INFORMATION MANAGEMENT | Facility: CLINIC | Age: 57
End: 2017-06-06

## 2017-07-03 DIAGNOSIS — E78.5 HYPERLIPIDEMIA LDL GOAL <130: Primary | ICD-10-CM

## 2017-07-03 DIAGNOSIS — I10 HYPERTENSION GOAL BP (BLOOD PRESSURE) < 140/90: ICD-10-CM

## 2017-07-03 DIAGNOSIS — Z00.00 LABORATORY EXAMINATION ORDERED AS PART OF A ROUTINE GENERAL MEDICAL EXAMINATION: ICD-10-CM

## 2017-07-03 DIAGNOSIS — Z11.59 NEED FOR HEPATITIS C SCREENING TEST: ICD-10-CM

## 2017-07-03 LAB
ALBUMIN SERPL-MCNC: 3.9 G/DL (ref 3.4–5)
ALP SERPL-CCNC: 83 U/L (ref 40–150)
ALT SERPL W P-5'-P-CCNC: 29 U/L (ref 0–50)
ANION GAP SERPL CALCULATED.3IONS-SCNC: 8 MMOL/L (ref 3–14)
AST SERPL W P-5'-P-CCNC: 16 U/L (ref 0–45)
BILIRUB SERPL-MCNC: 0.7 MG/DL (ref 0.2–1.3)
BUN SERPL-MCNC: 9 MG/DL (ref 7–30)
CALCIUM SERPL-MCNC: 9.2 MG/DL (ref 8.5–10.1)
CHLORIDE SERPL-SCNC: 106 MMOL/L (ref 94–109)
CHOLEST SERPL-MCNC: 204 MG/DL
CO2 SERPL-SCNC: 28 MMOL/L (ref 20–32)
CREAT SERPL-MCNC: 0.62 MG/DL (ref 0.52–1.04)
GFR SERPL CREATININE-BSD FRML MDRD: ABNORMAL ML/MIN/1.7M2
GLUCOSE SERPL-MCNC: 100 MG/DL (ref 70–99)
HCV AB SERPL QL IA: NORMAL
HDLC SERPL-MCNC: 65 MG/DL
LDLC SERPL CALC-MCNC: 110 MG/DL
NONHDLC SERPL-MCNC: 139 MG/DL
POTASSIUM SERPL-SCNC: 4 MMOL/L (ref 3.4–5.3)
PROT SERPL-MCNC: 7.7 G/DL (ref 6.8–8.8)
SODIUM SERPL-SCNC: 142 MMOL/L (ref 133–144)
TRIGL SERPL-MCNC: 146 MG/DL

## 2017-07-03 PROCEDURE — 36415 COLL VENOUS BLD VENIPUNCTURE: CPT | Performed by: PHYSICIAN ASSISTANT

## 2017-07-03 PROCEDURE — 80053 COMPREHEN METABOLIC PANEL: CPT | Performed by: PHYSICIAN ASSISTANT

## 2017-07-03 PROCEDURE — 86803 HEPATITIS C AB TEST: CPT | Performed by: PHYSICIAN ASSISTANT

## 2017-07-03 PROCEDURE — 80061 LIPID PANEL: CPT | Performed by: PHYSICIAN ASSISTANT

## 2017-07-14 ENCOUNTER — OFFICE VISIT (OUTPATIENT)
Dept: FAMILY MEDICINE | Facility: CLINIC | Age: 57
End: 2017-07-14
Payer: COMMERCIAL

## 2017-07-14 ENCOUNTER — TELEPHONE (OUTPATIENT)
Dept: FAMILY MEDICINE | Facility: CLINIC | Age: 57
End: 2017-07-14

## 2017-07-14 VITALS
OXYGEN SATURATION: 98 % | BODY MASS INDEX: 35.26 KG/M2 | SYSTOLIC BLOOD PRESSURE: 134 MMHG | HEART RATE: 67 BPM | TEMPERATURE: 98.9 F | RESPIRATION RATE: 16 BRPM | HEIGHT: 63 IN | WEIGHT: 199 LBS | DIASTOLIC BLOOD PRESSURE: 86 MMHG

## 2017-07-14 DIAGNOSIS — Z12.31 VISIT FOR SCREENING MAMMOGRAM: ICD-10-CM

## 2017-07-14 DIAGNOSIS — Z00.00 HEALTHCARE MAINTENANCE: Primary | ICD-10-CM

## 2017-07-14 DIAGNOSIS — G47.33 OSA (OBSTRUCTIVE SLEEP APNEA): ICD-10-CM

## 2017-07-14 PROCEDURE — 99396 PREV VISIT EST AGE 40-64: CPT | Performed by: FAMILY MEDICINE

## 2017-07-14 NOTE — MR AVS SNAPSHOT
After Visit Summary   7/14/2017    Theresa M Delosreyes    MRN: [id number]           Patient Information     Date Of Birth          1960        Visit Information        Provider Department      7/14/2017 9:20 AM Dilan Soto MD [facility]        Today's Diagnoses     Healthcare maintenance    -  1    Visit for screening mammogram        BRIT (obstructive sleep apnea)          Care Instructions      Preventive Health Recommendations  Female Ages 50 - 64    Yearly exam: See your health care provider every year in order to  o Review health changes.   o Discuss preventive care.    o Review your medicines if your doctor has prescribed any.      Get a Pap test every three years (unless you have an abnormal result and your provider advises testing more often).    If you get Pap tests with HPV test, you only need to test every 5 years, unless you have an abnormal result.     You do not need a Pap test if your uterus was removed (hysterectomy) and you have not had cancer.    You should be tested each year for STDs (sexually transmitted diseases) if you're at risk.     Have a mammogram every 1 to 2 years.    Have a colonoscopy at age 50, or have a yearly FIT test (stool test). These exams screen for colon cancer.      Have a cholesterol test every 5 years, or more often if advised.    Have a diabetes test (fasting glucose) every three years. If you are at risk for diabetes, you should have this test more often.     If you are at risk for osteoporosis (brittle bone disease), think about having a bone density scan (DEXA).    Shots: Get a flu shot each year. Get a tetanus shot every 10 years.    Nutrition:     Eat at least 5 servings of fruits and vegetables each day.    Eat whole-grain bread, whole-wheat pasta and brown rice instead of white grains and rice.    Talk to your provider about Calcium and Vitamin D.     Lifestyle    Exercise at least 150 minutes a week (30 minutes a day, 5 days a  week). This will help you control your weight and prevent disease.    Limit alcohol to one drink per day.    No smoking.     Wear sunscreen to prevent skin cancer.     See your dentist every six months for an exam and cleaning.    See your eye doctor every 1 to 2 years.    Preventive Health Recommendations  Female Ages 50 - 64    Yearly exam: See your health care provider every year in order to  o Review health changes.   o Discuss preventive care.    o Review your medicines if your doctor has prescribed any.      Get a Pap test every three years (unless you have an abnormal result and your provider advises testing more often).    If you get Pap tests with HPV test, you only need to test every 5 years, unless you have an abnormal result.     You do not need a Pap test if your uterus was removed (hysterectomy) and you have not had cancer.    You should be tested each year for STDs (sexually transmitted diseases) if you're at risk.     Have a mammogram every 1 to 2 years.    Have a colonoscopy at age 50, or have a yearly FIT test (stool test). These exams screen for colon cancer.      Have a cholesterol test every 5 years, or more often if advised.    Have a diabetes test (fasting glucose) every three years. If you are at risk for diabetes, you should have this test more often.     If you are at risk for osteoporosis (brittle bone disease), think about having a bone density scan (DEXA).    Shots: Get a flu shot each year. Get a tetanus shot every 10 years.    Nutrition:     Eat at least 5 servings of fruits and vegetables each day.    Eat whole-grain bread, whole-wheat pasta and brown rice instead of white grains and rice.    Talk to your provider about Calcium and Vitamin D.     Lifestyle    Exercise at least 150 minutes a week (30 minutes a day, 5 days a week). This will help you control your weight and prevent disease.    Limit alcohol to one drink per day.    No smoking.     Wear sunscreen to prevent skin cancer.      See your dentist every six months for an exam and cleaning.    See your eye doctor every 1 to 2 years.            Follow-ups after your visit        Additional Services     SLEEP EVALUATION & MANAGEMENT REFERRAL - ADULT       Please be aware that coverage of these services is subject to the terms and limitations of your health insurance plan.  Call member services at your health plan with any benefit or coverage questions.      Please bring the following to your appointment:    >>   List of current medications   >>   This referral request   >>   Any documents/labs given to you for this referral    Davis Marin Funez)   [phone] (Age 18 and up)                  Future tests that were ordered for you today     Open Future Orders        Priority Expected Expires Ordered    SLEEP EVALUATION & MANAGEMENT REFERRAL - ADULT Routine  7/14/2018 7/14/2017            Who to contact     If you have questions or need follow up information about today's clinic visit or your schedule please contact [facility] directly at [phone].  Normal or non-critical lab and imaging results will be communicated to you by MyChart, letter or phone within 4 business days after the clinic has received the results. If you do not hear from us within 7 days, please contact the clinic through [employer] or phone. If you have a critical or abnormal lab result, we will notify you by phone as soon as possible.  Submit refill requests through [employer] or call your pharmacy and they will forward the refill request to us. Please allow 3 business days for your refill to be completed.          Additional Information About Your Visit        [employer] Information     [employer] gives you secure access to your electronic health record. If you see a primary care provider, you can also send messages to your care team and make appointments. If you have questions, please call your primary care clinic.  If you do not have a primary care  "provider, please call [phone] and they will assist you.        Care EveryWhere ID     This is your Care EveryWhere ID. This could be used by other organizations to access your [facility] medical records  [id number]        Your Vitals Were     Pulse Temperature Respirations Height Last Period Pulse Oximetry    67 98.9  F (37.2  C) (Tympanic) 16 5' 3\" (1.6 m) 02/23/2013 98%    BMI (Body Mass Index)                   35.25 kg/m2            Blood Pressure from Last 3 Encounters:   07/14/17 134/86   05/19/17 160/84   01/11/17 134/72    Weight from Last 3 Encounters:   07/14/17 199 lb (90.3 kg)   05/19/17 197 lb (89.4 kg)   01/11/17 195 lb (88.5 kg)               Primary Care Provider Office Phone # Fax #    Dilan NIKKI Soto [phone]       [address] [address]        Equal Access to Services     [facility] AH: Hadii aad ku hadasho Soomaali, waaxda luqadaha, qaybta kaalmada adeegyada, waxay annein haycornel cuevas . So [facility] [phone].    ATENCIÓN: Si habla español, tiene a gil disposición servicios gratuitos de asistencia lingüística. Llame al [phone].    We comply with applicable federal civil rights laws and [place] laws. We do not discriminate on the basis of race, color, national origin, age, disability sex, sexual orientation or gender identity.            Thank you!     Thank you for choosing [facility]  for your care. Our goal is always to provide you with excellent care. Hearing back from our patients is one way we can continue to improve our services. Please take a few minutes to complete the written survey that you may receive in the mail after your visit with us. Thank you!             Your Updated Medication List - Protect others around you: Learn how to safely use, store and throw away your medicines at www.disposemymeds.org.          This list is accurate as of: 7/14/17  9:53 AM.  Always use your most " recent med list.                   Brand Name Dispense Instructions for use Diagnosis    ACETAMINOPHEN PO      Take 1,500 mg by mouth daily as needed for pain        candesartan cilexetil 32 MG Tabs     90 tablet    Take 32 mg by mouth daily    Benign essential hypertension       carvedilol 25 MG tablet    COREG    60 tablet    Take 1 tablet (25 mg) by mouth 2 times daily (with meals)    Benign essential hypertension       letrozole 2.5 MG tablet    FEMARA     Take 2.5 mg by mouth At Bedtime        VITAMIN B12 PO      Take by mouth daily        VITAMIN D (CHOLECALCIFEROL) PO      Take 3,000 Units by mouth daily

## 2017-07-14 NOTE — PROGRESS NOTES
SUBJECTIVE:   CC: Theresa M Delosreyes is an 56 year old woman who presents for preventive health visit.     Healthy Habits:    Do you get at least three servings of calcium containing foods daily (dairy, green leafy vegetables, etc.)? no, taking calcium and/or vitamin D supplement: no    Amount of exercise or daily activities, outside of work: 3-4 day(s) per week    Problems taking medications regularly No    Medication side effects: No    Have you had an eye exam in the past two years? no    Do you see a dentist twice per year? yes    Do you have sleep apnea, excessive snoring or daytime drowsiness? yes    Today's PHQ-2 Score:   PHQ-2 ( 1999 Pfizer) 11/16/2016 9/13/2016   Q1: Little interest or pleasure in doing things 0 0   Q2: Feeling down, depressed or hopeless 0 0   PHQ-2 Score 0 0   Q1: Little interest or pleasure in doing things - -   Q2: Feeling down, depressed or hopeless - -   PHQ-2 Score - -       Abuse: Current or Past(Physical, Sexual or Emotional)- No  Do you feel safe in your environment - Yes    Social History   Substance Use Topics     Smoking status: Never Smoker     Smokeless tobacco: Never Used     Alcohol use 0.0 oz/week     0 Standard drinks or equivalent per week      Comment: 2 per year     The patient does not drink >3 drinks per day nor >7 drinks per week.    Reviewed orders with patient.  Reviewed health maintenance and updated orders accordingly - Yes  BP Readings from Last 3 Encounters:   07/14/17 134/86   05/19/17 160/84   01/11/17 134/72    Wt Readings from Last 3 Encounters:   07/14/17 199 lb (90.3 kg)   05/19/17 197 lb (89.4 kg)   01/11/17 195 lb (88.5 kg)                  Patient Active Problem List   Diagnosis     Female stress incontinence     Status post laparoscopic cholecystectomy     Osteoarthritis of both knees     Obesity (BMI 30-39.9)     BRIT (obstructive sleep apnea)     Hyperlipidemia LDL goal <130     Hypertension goal BP (blood pressure) < 140/90     Chest pain      "Infiltrating ductal carcinoma of right female breast (H)     Antineoplastic antibiotics causing adverse effect in therapeutic use     Breast cancer (H)     Benign essential hypertension     Drugs and medicinal substances causing adverse effect in therapeutic use, initial encounter     Past Surgical History:   Procedure Laterality Date     COLONOSCOPY  5/2006    ormal to lt transverse colon, incomplete due to \"extremely redundant colon\"\"     D & C  9/2004     EXTRACT LENS CLEAR, EXCHANGE LENS REFRACTIVE, COMBINED  3/2013    bilateral     GRAFT FAT TO BREAST Bilateral 11/18/2016    Procedure: GRAFT FAT TO BREAST;  Surgeon: Alhaji Patton MD;  [facility]     GRAFT FREE VASCULARIZED TRANSVERSE RECTUS ABDOMINIS MYOCUTANEOUS Bilateral 4/12/2016    Procedure: GRAFT FREE VASCULARIZED TRANSVERSE RECTUS ABDOMINIS MYOCUTANEOUS;  Surgeon: Alhaji Patton MD;  Location:  OR     HC ABLATION, ENDOMETRIAL, THERMAL, W/O HYSTEROSCOPIC GUIDANCE  11/2004    NovaSure endometrial ablation.      HC COLON AIR CONTRAST  5/2006    normal     HC SLING OPERATION FOR STRESS INCONTINENCE  1/2007    Monarc sling procedure     INSERT PORT VASCULAR ACCESS Left 11/9/2015    Procedure: INSERT PORT VASCULAR ACCESS;  Surgeon: August Rdz MD;  [facility]:  [facility]     INSERT TISSUE EXPANDER BREAST BILATERAL Bilateral 9/29/2015    Procedure: INSERT TISSUE EXPANDER BREAST BILATERAL;  Surgeon: Alhaji Patton MD;  Location:  OR     LAPAROSCOPIC CHOLECYSTECTOMY WITH CHOLANGIOGRAMS  1/2005    Laparoscopic cholecystectomy with intraoperative  cholangiogram     MASTECTOMY SIMPLE BILATERAL, SENTINEL NODE BILATERAL, COMBINED Bilateral 9/29/2015    Procedure: COMBINED MASTECTOMY SIMPLE BILATERAL, SENTINEL NODE BILATERAL;  Surgeon: August Rdz MD;  Location:  OR     RECONSTRUCT BREAST BILATERAL Bilateral 11/18/2016    Procedure: RECONSTRUCT BREAST BILATERAL;  Surgeon: Alhaji Patton MD;  [facility]:  [facility] "     REMOVE PORT VASCULAR ACCESS N/A 11/18/2016    Procedure: REMOVE PORT VASCULAR ACCESS;  Surgeon: Alhaji Patton MD;  Location:  [facility]     REMOVE TISSUE EXPANDER BREAST Bilateral 4/12/2016    Procedure: REMOVE TISSUE EXPANDER BREAST;  Surgeon: Alhaji Patton MD;  Location:  OR     REVISE SCAR TRUNK N/A 11/18/2016    Procedure: REVISE SCAR TRUNK;  Surgeon: Alhaji Patton MD;  [facility]:  [facility]     TONSILLECTOMY  2001     VASCULAR SURGERY      insert port and removed       Social History   Substance Use Topics     Smoking status: Never Smoker     Smokeless tobacco: Never Used     Alcohol use No     Family History   Problem Relation Age of Onset     Asthma Daughter      C.A.D. Father 40     MI     CEREBROVASCULAR DISEASE Father      Hypertension Mother      Breast Cancer Mother      Thyroid Disease Mother      hypothyroidism     Breast Cancer Sister      Hypertension Sister      Hypertension Brother      HEART DISEASE Brother      pacemaker     Hypertension Brother      DIABETES Brother      type 2     Myocardial Infarction Maternal Grandmother      DIABETES Paternal Grandmother      Unknown/Adopted Maternal Grandfather      Unknown/Adopted Paternal Grandfather      DIABETES Maternal Aunt      Cancer - colorectal Maternal Uncle      DIABETES Maternal Aunt      with retinopathy leading to blindness         Current Outpatient Prescriptions   Medication Sig Dispense Refill     Cyanocobalamin (VITAMIN B12 PO) Take by mouth daily       carvedilol (COREG) 25 MG tablet Take 1 tablet (25 mg) by mouth 2 times daily (with meals) 60 tablet 11     candesartan 32 MG TABS Take 32 mg by mouth daily 90 tablet 3     VITAMIN D, CHOLECALCIFEROL, PO Take 3,000 Units by mouth daily       letrozole (FEMARA) 2.5 MG tablet Take 2.5 mg by mouth At Bedtime   0     ACETAMINOPHEN PO Take 1,500 mg by mouth daily as needed for pain       Allergies   Allergen Reactions     Definity Other (See Comments)     A  substance that helps with imaging the heart muscle with echocardiology    Full body ache starting in the low back.      Keflex [Cephalexin]      Hand swelling, blisters???     Recent Labs   Lab Test  07/03/17   0824  02/15/17   1552   04/03/15   0717  09/11/14   1656  08/08/14   0729  03/04/13   0953   10/28/11   0726   LDL  110*   --    --   84   --   112  138*   --   126   HDL  65   --    --   62   --   57  54   --   56   TRIG  146   --    --   119   --   162*  133   --   115   ALT  29   --    --    --    --    --   30   --   13   CR  0.62  0.80   < >  0.64   --   0.82  0.60   --   0.69   GFRESTIMATED  >90  Non  GFR Calc    74   < >  >90  Non  GFR Calc     --   73  >90   --   >90   GFRESTBLACK  >90   GFR Calc    >90   < >  >90   GFR Calc     --   89  >90   --   >90   POTASSIUM  4.0  4.3   < >  3.6   --   3.9  4.2   --   4.1   TSH   --    --    --   2.34  2.42   --   1.98   < >   --     < > = values in this interval not displayed.        Alternate mammogram schedule due to breast cancer history     Pertinent mammograms are reviewed under the imaging tab.  History of abnormal Pap smear: NO - age 30- 65 PAP every 3 years recommended    Reviewed and updated as needed this visit by clinical staff         Reviewed and updated as needed this visit by Provider        Past Medical History:   Diagnosis Date     Benign hypertension     started medication 3/13     Cancer (H)     breast cancer     Chest pain     side effect of Femera     Female stress incontinence 12/18/2006    s/p sling procedure     Mixed hyperlipidemia 2013     Motion sickness      Obesity (BMI 30-39.9) 2008     BRIT (obstructive sleep apnea)     not using CPAP, treated with tonsilliectomy     Osteoarthritis of both knees 2008     PONV (postoperative nausea and vomiting)      Status post laparoscopic cholecystectomy 1/2005    Cholelithiasis with mild subacute hemorrhagic cholecystitis      "Supervision of other normal pregnancy      - vaginal     Uncomplicated asthma     with respiratory infections      Past Surgical History:   Procedure Laterality Date     COLONOSCOPY  2006    ormal to lt transverse colon, incomplete due to \"extremely redundant colon\"\"     D & C  2004     EXTRACT LENS CLEAR, EXCHANGE LENS REFRACTIVE, COMBINED  3/2013    bilateral     GRAFT FAT TO BREAST Bilateral 2016    Procedure: GRAFT FAT TO BREAST;  Surgeon: Alhaji Patton MD;  [facility]  [facility]     GRAFT FREE VASCULARIZED TRANSVERSE RECTUS ABDOMINIS MYOCUTANEOUS Bilateral 2016    Procedure: GRAFT FREE VASCULARIZED TRANSVERSE RECTUS ABDOMINIS MYOCUTANEOUS;  Surgeon: Alhaji Patton MD;  Location:  OR     HC ABLATION, ENDOMETRIAL, THERMAL, W/O HYSTEROSCOPIC GUIDANCE  2004    NovaSure endometrial ablation.      HC COLON AIR CONTRAST  2006    normal     HC SLING OPERATION FOR STRESS INCONTINENCE  2007    Monarc sling procedure     INSERT PORT VASCULAR ACCESS Left 2015    Procedure: INSERT PORT VASCULAR ACCESS;  Surgeon: August Rdz MD;  [facility]:  [facility]     INSERT TISSUE EXPANDER BREAST BILATERAL Bilateral 2015    Procedure: INSERT TISSUE EXPANDER BREAST BILATERAL;  Surgeon: Alhaji Patton MD;  Location:  OR     LAPAROSCOPIC CHOLECYSTECTOMY WITH CHOLANGIOGRAMS  2005    Laparoscopic cholecystectomy with intraoperative  cholangiogram     MASTECTOMY SIMPLE BILATERAL, SENTINEL NODE BILATERAL, COMBINED Bilateral 2015    Procedure: COMBINED MASTECTOMY SIMPLE BILATERAL, SENTINEL NODE BILATERAL;  Surgeon: August Rdz MD;  Location:  OR     RECONSTRUCT BREAST BILATERAL Bilateral 2016    Procedure: RECONSTRUCT BREAST BILATERAL;  Surgeon: Alhaji Patton MD;  [facility]:  [facility]     REMOVE PORT VASCULAR ACCESS N/A 2016    Procedure: REMOVE PORT VASCULAR ACCESS;  Surgeon: Alhaji Patton MD;  Location:  [facility]     REMOVE " "TISSUE EXPANDER BREAST Bilateral 4/12/2016    Procedure: REMOVE TISSUE EXPANDER BREAST;  Surgeon: Alhaji Patton MD;  Location:  OR     REVISE SCAR TRUNK N/A 11/18/2016    Procedure: REVISE SCAR TRUNK;  Surgeon: Alhaji Patton MD;  [facility]:  [facility]     TONSILLECTOMY  2001     VASCULAR SURGERY      insert port and removed       ROS:  C: NEGATIVE for fever, chills, change in weight  I: NEGATIVE for worrisome rashes, moles or lesions  E: NEGATIVE for vision changes or irritation  ENT: NEGATIVE for ear, mouth and throat problems  R: NEGATIVE for significant cough or SOB  B: NEGATIVE for masses, tenderness or discharge  CV: NEGATIVE for chest pain, palpitations or peripheral edema  GI: NEGATIVE for nausea, abdominal pain, heartburn, or change in bowel habits  : NEGATIVE for unusual urinary or vaginal symptoms. Periods are regular.  M: NEGATIVE for significant arthralgias or myalgia  N: NEGATIVE for weakness, dizziness or paresthesias  P: NEGATIVE for changes in mood or affect    OBJECTIVE:   /86 (Cuff Size: Adult Large)  Pulse 67  Temp 98.9  F (37.2  C) (Tympanic)  Resp 16  Ht 5' 3\" (1.6 m)  Wt 199 lb (90.3 kg)  LMP 02/23/2013  SpO2 98%  BMI 35.25 kg/m2  EXAM:  GENERAL: healthy, alert and no distress  EYES: Eyes grossly normal to inspection, PERRL and conjunctivae and sclerae normal  HENT: ear canals and TM's normal, nose and mouth without ulcers or lesions  NECK: no adenopathy, no asymmetry, masses, or scars and thyroid normal to palpation  RESP: lungs clear to auscultation - no rales, rhonchi or wheezes  CV: regular rate and rhythm, normal S1 S2, no S3 or S4, no murmur, click or rub, no peripheral edema and peripheral pulses strong  ABDOMEN: soft, nontender, no hepatosplenomegaly, no masses and bowel sounds normal  MS: no gross musculoskeletal defects noted, no edema  SKIN: no suspicious lesions or rashes  NEURO: Normal strength and tone, mentation intact and speech normal  BACK: " "no CVA tenderness, no paralumbar tenderness  PSYCH: mentation appears normal, affect normal/bright  LYMPH: no cervical, supraclavicular, axillary, or inguinal adenopathy    ASSESSMENT/PLAN:       ICD-10-CM    1. Healthcare maintenance Z00.00    2. Visit for screening mammogram Z12.31    3. BRIT (obstructive sleep apnea) G47.33 SLEEP EVALUATION & MANAGEMENT REFERRAL - ADULT       COUNSELING:   Reviewed preventive health counseling, as reflected in patient instructions    BP Screening:   Last 3 BP Readings:    BP Readings from Last 3 Encounters:   07/14/17 134/86   05/19/17 160/84   01/11/17 134/72       The following was recommended to the patient:  Re-screen BP within a year and recommended lifestyle modifications     reports that she has never smoked. She has never used smokeless tobacco.    Estimated body mass index is 34.9 kg/(m^2) as calculated from the following:    Height as of 5/19/17: 5' 3\" (1.6 m).    Weight as of 5/19/17: 197 lb (89.4 kg).       Counseling Resources:  ATP IV Guidelines  Pooled Cohorts Equation Calculator  Breast Cancer Risk Calculator  FRAX Risk Assessment  ICSI Preventive Guidelines  Dietary Guidelines for Americans, 2010  Tweet Category's MyPlate  ASA Prophylaxis  Lung CA Screening    Dilan Soto MD  [facility]  "

## 2017-07-14 NOTE — TELEPHONE ENCOUNTER
[facility]  Calling to request patient's labs done 7/3 so that they don't duplicate labs  TC faxed [phone]  Brooklyn Moyer TC

## 2017-07-14 NOTE — NURSING NOTE
"Chief Complaint   Patient presents with     Physical       Initial /86 (Cuff Size: Adult Large)  Pulse 67  Temp 98.9  F (37.2  C) (Tympanic)  Resp 16  Ht 5' 3\" (1.6 m)  Wt 199 lb (90.3 kg)  LMP 02/23/2013  SpO2 98%  BMI 35.25 kg/m2 Estimated body mass index is 35.25 kg/(m^2) as calculated from the following:    Height as of this encounter: 5' 3\" (1.6 m).    Weight as of this encounter: 199 lb (90.3 kg).  Medication Reconciliation: complete   Jaquelin Weir, CMA    "

## 2017-07-21 ENCOUNTER — OFFICE VISIT (OUTPATIENT)
Dept: SLEEP MEDICINE | Facility: CLINIC | Age: 57
End: 2017-07-21
Attending: FAMILY MEDICINE
Payer: COMMERCIAL

## 2017-07-21 VITALS
BODY MASS INDEX: 35.22 KG/M2 | OXYGEN SATURATION: 98 % | HEART RATE: 69 BPM | DIASTOLIC BLOOD PRESSURE: 80 MMHG | RESPIRATION RATE: 16 BRPM | SYSTOLIC BLOOD PRESSURE: 148 MMHG | HEIGHT: 63 IN | WEIGHT: 198.8 LBS

## 2017-07-21 DIAGNOSIS — G47.33 OSA (OBSTRUCTIVE SLEEP APNEA): Primary | ICD-10-CM

## 2017-07-21 PROCEDURE — 99243 OFF/OP CNSLTJ NEW/EST LOW 30: CPT | Performed by: PHYSICIAN ASSISTANT

## 2017-07-21 NOTE — PROGRESS NOTES
Sleep Consultation:    Date on this visit: 7/21/2017    Theresa M Delosreyes is sent by Dilan Soto for a sleep consultation regarding brit.    Primary Physician: Dilan Soto     Theresa M Delosreyes presents at the request of Dr. Soto for re-evaluation of previously diagnosed BRIT. Her medical history is significant for HTN, breast cancer, high cholesterol and obesity.    Her previous sleep study occurred about 20 years ago in [place].  She used CPAP for about 1 year. She did not like it. The mask caused her skin to break out. She used a full face mask. She had her tonsils removed and that helped until she gained weight gradually about 5-7 years ago.    She returns because her blood pressure has been labile and difficult to control. She is tired in the daytime, some of which could be from dealing with breast cancer. She sleeps in an adjustable bed. Her head and feet are both elevated. She is not told much about her breathing while asleep. She does not snore as much as she used to. She sometimes wakes with a sore throat. She also sometimes wakes herself with a snort. If she does that, she increased the head of bed.      Melina goes to sleep at 8:30-9:30 PM during the week. She wakes up at 4:45 AM with an alarm. She falls asleep in 10-20 minutes.  Melina denies difficulty falling asleep. Atenolol made her exhausted. She recently switched to carvedilol and that has been an improvement. She has occasionally taken Tylenol PM if she knows she needs to sleep better. That helps.  She wakes up 2-3 times a night for 5-60 minutes before falling back to sleep.  Melina wakes up to uncertain reasons.  On weekends, Melina goes to sleep at 10:00 PM.  She wakes up at 7:00-9:00 AM without an alarm. She falls asleep in 10-20 minutes. She still wakes the same in the night.   Patient gets an average of 7 hours of sleep per week night and 8-9 hours on weekends. She struggled severely with insomnia when she was going through  chemo.    Patient does watch TV in bed (briefly, falls asleep to it,  turns it off) and does not use electronics in bed, worry in bed about anything and read in bed.     Melina does not do shift work.  She works day shifts.  She works as a . She likes to start early and get done early. She lives with her . They watch their 3 grand kids on weekends, overnight.    Melina does snore some nights (2-3 times per week) and snoring is not very loud. Patient does have a regular bed partner who snores very loudly, so may not be a very reliable witness. She does have witnessed apneas maybe once per month. They never sleep separately.  Patient sleeps on her back. She has occasional morning headaches (2-3 times per week), denies morning confusion and restless legs. Melina denies any bruxism, sleep walking, sleep talking, dream enactment, sleep paralysis, cataplexy and hypnogogic/hypnopompic hallucinations.    Melina denies difficulty breathing through her nose, claustrophobia, reflux at night, heartburn and depression.      Melina's weight is essentially the same as 5 years ago.  Patient describes themself as neither a morning or night person.  She would prefer to go to sleep at 10:00 PM and wake up at 6:00 AM.  Patient's [place] Sleepiness score 9/24 inconsistent with daytime sleepiness.      Melina naps 2-3 times per week for 30 minutes, feels refreshed after naps. She takes infrequent inadvertant naps watching TV after dinner.  She denies dozing while driving or as a passenger.  Patient was counseled on the importance of driving while alert, to pull over if drowsy, or nap before getting into the vehicle if sleepy.  She uses 3-4 cups/day of coffee. Last caffeine intake is usually before 10 AM.    Allergies:    Allergies   Allergen Reactions     Definity Other (See Comments)     A substance that helps with imaging the heart muscle with echocardiology    Full body ache starting in the low  back.      Keflex [Cephalexin]      Hand swelling, blisters???       Medications:    Current Outpatient Prescriptions   Medication Sig Dispense Refill     Cyanocobalamin (VITAMIN B12 PO) Take by mouth daily       carvedilol (COREG) 25 MG tablet Take 1 tablet (25 mg) by mouth 2 times daily (with meals) 60 tablet 11     candesartan 32 MG TABS Take 32 mg by mouth daily 90 tablet 3     VITAMIN D, CHOLECALCIFEROL, PO Take 3,000 Units by mouth daily       letrozole (FEMARA) 2.5 MG tablet Take 2.5 mg by mouth At Bedtime   0     ACETAMINOPHEN PO Take 1,500 mg by mouth daily as needed for pain         Problem List:  Patient Active Problem List    Diagnosis Date Noted     Hypertension goal BP (blood pressure) < 140/90 07/13/2013     Priority: High     Hyperlipidemia LDL goal <130 03/05/2013     Priority: High     Osteoarthritis of both knees      Priority: High     Obesity (BMI 30-39.9)      Priority: High     BRIT (obstructive sleep apnea)      Priority: High     not using CPAP, treated with tonsilliectomy       Female stress incontinence 12/18/2006     Priority: High     Benign essential hypertension 05/19/2017     Priority: Medium     Drugs and medicinal substances causing adverse effect in therapeutic use, initial encounter 05/19/2017     Priority: Medium     Breast cancer (H) 04/13/2016     Priority: Medium     Infiltrating ductal carcinoma of right female breast (H) 10/20/2015     Priority: Medium     Antineoplastic antibiotics causing adverse effect in therapeutic use 10/20/2015     Priority: Medium     Chest pain 05/01/2015     Priority: Medium     Status post laparoscopic cholecystectomy 01/01/2005     Priority: Low     Cholelithiasis with mild subacute hemorrhagic cholecystitis          Past Medical/Surgical History:  Past Medical History:   Diagnosis Date     Benign hypertension     started medication 3/13     Cancer (H)     breast cancer     Chest pain     side effect of Femera     Female stress incontinence  "2006    s/p sling procedure     Mixed hyperlipidemia      Motion sickness      Obesity (BMI 30-39.9)      BRIT (obstructive sleep apnea)     not using CPAP, treated with tonsilliectomy     Osteoarthritis of both knees      PONV (postoperative nausea and vomiting)      Status post laparoscopic cholecystectomy 2005    Cholelithiasis with mild subacute hemorrhagic cholecystitis     Supervision of other normal pregnancy      - vaginal     Uncomplicated asthma     with respiratory infections     Past Surgical History:   Procedure Laterality Date     COLONOSCOPY  2006    ormal to lt transverse colon, incomplete due to \"extremely redundant colon\"\"     D & C  2004     EXTRACT LENS CLEAR, EXCHANGE LENS REFRACTIVE, COMBINED  3/2013    bilateral     GRAFT FAT TO BREAST Bilateral 2016    Procedure: GRAFT FAT TO BREAST;  Surgeon: Alhaji Patton MD;  Location:  [facility]     GRAFT FREE VASCULARIZED TRANSVERSE RECTUS ABDOMINIS MYOCUTANEOUS Bilateral 2016    Procedure: GRAFT FREE VASCULARIZED TRANSVERSE RECTUS ABDOMINIS MYOCUTANEOUS;  Surgeon: Alhaji Patton MD;  Location: [facility]     HC ABLATION, ENDOMETRIAL, THERMAL, W/O HYSTEROSCOPIC GUIDANCE  2004    NovaSure endometrial ablation.      HC COLON AIR CONTRAST  2006    normal     HC SLING OPERATION FOR STRESS INCONTINENCE  2007    [facility] sling procedure     INSERT PORT VASCULAR ACCESS Left 2015    Procedure: INSERT PORT VASCULAR ACCESS;  Surgeon: August Rdz MD;  Location:  [facility]     INSERT TISSUE EXPANDER BREAST BILATERAL Bilateral 2015    Procedure: INSERT TISSUE EXPANDER BREAST BILATERAL;  Surgeon: Alhaji Patton MD;  Location:  OR     LAPAROSCOPIC CHOLECYSTECTOMY WITH CHOLANGIOGRAMS  2005    Laparoscopic cholecystectomy with intraoperative  cholangiogram     MASTECTOMY SIMPLE BILATERAL, SENTINEL NODE BILATERAL, COMBINED Bilateral 2015    Procedure: COMBINED MASTECTOMY SIMPLE " BILATERAL, SENTINEL NODE BILATERAL;  Surgeon: August Rdz MD;  Location:  OR     RECONSTRUCT BREAST BILATERAL Bilateral 11/18/2016    Procedure: RECONSTRUCT BREAST BILATERAL;  Surgeon: Alhaji Patton MD;  Location: [facility]     REMOVE PORT VASCULAR ACCESS N/A 11/18/2016    Procedure: REMOVE PORT VASCULAR ACCESS;  Surgeon: Alhaji Patton MD;  [facility]     REMOVE TISSUE EXPANDER BREAST Bilateral 4/12/2016    Procedure: REMOVE TISSUE EXPANDER BREAST;  Surgeon: Alhaji Patton MD;  Location:  OR     REVISE SCAR TRUNK N/A 11/18/2016    Procedure: REVISE SCAR TRUNK;  Surgeon: Alhaji Patton MD;  Location: [facility]     TONSILLECTOMY  2001     VASCULAR SURGERY      insert port and removed       Social History:  Social History     Social History     Marital status:      Spouse name: N/A     Number of children: 2     Years of education: 12     Occupational History           Social History Main Topics     Smoking status: Never Smoker     Smokeless tobacco: Never Used     Alcohol use No     Drug use: No     Sexual activity: Yes     Partners: Male     Birth control/ protection: Surgical     Other Topics Concern     Blood Transfusions No     Caffeine Concern No     1 cup coffee     Sleep Concern Yes     Sleep apnea, does not wear CPAP     Stress Concern No     Weight Concern Yes     weight fluctuates     Special Diet No     Exercise No     Seat Belt Yes     Self-Exams Yes     Social History Narrative    The patient does not eat fruits every day, but does eat vegetables every day. Her calcium and vitamin D intake is low. This was discussed.       Family History:  Family History   Problem Relation Age of Onset     Asthma Daughter      C.A.D. Father 40     MI     CEREBROVASCULAR DISEASE Father      Hypertension Mother      Breast Cancer Mother      Thyroid Disease Mother      hypothyroidism     Sleep Apnea Mother      Breast Cancer Sister       Hypertension Sister      Hypertension Brother      HEART DISEASE Brother      pacemaker     Sleep Apnea Brother      Hypertension Brother      DIABETES Brother      type 2     Sleep Apnea Brother      Myocardial Infarction Maternal Grandmother      DIABETES Paternal Grandmother      Unknown/Adopted Maternal Grandfather      Unknown/Adopted Paternal Grandfather      DIABETES Maternal Aunt      Cancer - colorectal Maternal Uncle      DIABETES Maternal Aunt      with retinopathy leading to blindness       Review of Systems:  A complete review of systems reviewed by me is negative with the exeption of what has been mentioned in the history of present illness.  CONSTITUTIONAL: NEGATIVE for weight gain/loss, fever, chills, sweats or night sweats.  CONSTITUTIONAL:  POSITIVE for  drug allergies and hot flashes  EYES: NEGATIVE for changes in vision, blind spots, double vision.  ENT: NEGATIVE for ear pain, sinus pain, post-nasal drip, bloody nose  ENT:  POSITIVE for  sore throat and runny nose  CARDIAC: NEGATIVE for fast heartbeats or fluttering in chest, chest pain or pressure, breathlessness when lying flat, swollen legs or swollen feet.  CARDIAC:  POSITIVE for  HTN  NEUROLOGIC: NEGATIVE weakness or numbness in the arms or legs.  NEUROLOGIC:  POSITIVE for  headaches  DERMATOLOGIC: NEGATIVE for rashes, new moles or change in mole(s)  PULMONARY: NEGATIVE SOB at rest, productive cough, coughing up blood, wheezing or whistling when breathing.    PULMONARY:  POSITIVE for  SOB with activity and dry cough  GASTROINTESTINAL: NEGATIVE for nausea or vomitting, loose or watery stools, fat or grease in stools, constipation, abdominal pain, bowel movements black in color or blood noted.  GENITOURINARY: NEGATIVE for pain during urination, blood in urine, urinating more frequently than usual, irregular menstrual periods.  MUSCULOSKELETAL: NEGATIVE for swollen joints.  MUSCULOSKELETAL:  POSITIVE for  muscle pain and bone or joint  "pain  ENDOCRINE: NEGATIVE for increased thirst or urination, diabetes.  LYMPHATIC: NEGATIVE for swollen lymph nodes, lumps or bumps in the breasts or nipple discharge.    Physical Examination:  Vitals: /78  Pulse 69  Resp 16  Ht 1.6 m (5' 3\")  Wt 90.2 kg (198 lb 12.8 oz)  LMP 02/23/2013  SpO2 98%  BMI 35.22 kg/m2 BP Re-measured at 148/80    Neck Cir (cm): 40 cm    Holt Total Score 7/21/2017   Total score - Holt 9       GENERAL APPEARANCE: healthy, alert, no distress and cooperative  EYES: Eyes grossly normal to inspection, PERRL, conjunctivae and sclerae normal and lids and lashes normal  HENT: nose and mouth without ulcers or lesions and tonsils and uvula surgically absent  NECK: no adenopathy, no asymmetry, masses, or scars, thyroid normal to palpation and trachea midline and normal to palpation  RESP: lungs clear to auscultation - no rales, rhonchi or wheezes  CV: regular rates and rhythm, normal S1 S2, no S3 or S4, no murmur, click or rub and no irregular beats  LYMPHATICS: normal ant/post cervical and supraclavicular nodes  MS: extremities normal- no gross deformities noted  NEURO: Normal strength and tone, mentation intact, speech normal and cranial nerves 2-12 intact  Mallampati Class: I.  Tonsillar Stage: 0  surgically removed.    Impression/Plan:    (G47.33) BRIT (obstructive sleep apnea)  Comment: Melina presents at the request of Dr. Soto for re-evaluation of previously diagnosed BRIT. She has been having labile blood pressure. She had a prior sleep study about 20 years ago and we do not have a copy of those records. She tried CPAP for a year but the mask irritated her skin. She had a tonsillectomy and UPPP and now sleeps with an elevated head of bed to try to minimize apnea. She thinks she still has some apnea although her symptoms are less. She has some snoring and observed apnea, but not nightly. Her  may not comment much anymore because he is used to her breathing. She " occasionally wakes with a sore throat and headache from snoring/not sleeping well. STOP BANG TOTAL: at least 4 (HTN, BMI 35, age 56, neck circumference 40 cm). Her STOP BANG is 6 if snoring and observed apnea are included.  She is very reluctant to do an in lab study because she did not sleep well  Plan: Home sleep study.       Literature provided regarding sleep apnea.      She will follow up with me in approximately two weeks after her sleep study has been competed to review the results and discuss plan of care.       Polysomnography reviewed.  Obstructive sleep apnea reviewed.  Complications of untreated sleep apnea were reviewed.  45 minutes was spent during this visit, over 50% in counseling and coordination of care.   Bennett Goltz, PA-C    CC: Dilan Soto

## 2017-07-21 NOTE — NURSING NOTE
"Chief Complaint   Patient presents with     Sleep Problem     recommended by charan to have assessed       Initial LMP 02/23/2013 Estimated body mass index is 35.25 kg/(m^2) as calculated from the following:    Height as of 7/14/17: 1.6 m (5' 3\").    Weight as of 7/14/17: 90.3 kg (199 lb).  Medication Reconciliation: complete     ESS 9    "

## 2017-07-21 NOTE — MR AVS SNAPSHOT
"              After Visit Summary   7/21/2017    Theresa M Delosreyes    MRN: [id number]           Patient Information     Date Of Birth          1960        Visit Information        Provider Department      7/21/2017 11:00 AM Goltz, Bennett Ezra, PA-C [facility]        Today's Diagnoses     BRIT (obstructive sleep apnea)    -  1      Care Instructions    MY TREATMENT INFORMATION FOR SLEEP APNEA-  Theresa M Delosreyes    DOCTOR : Bennett Ezra Goltz, PA-C  SLEEP CENTER : [facility]     MY CONTACT NUMBER: [phone]    Am I having a sleep study at a sleep center?  Make sure you have an appointment for the study before you leave!    Am I having a home sleep study?  Watch this video:  https://www.writewith.com/watch?v=CteI_GhyP9g&list=[email or web address]_[email or web address]    Frequently asked questions:  1. What is Obstructive Sleep Apnea (BRIT)? BRIT is the most common type of sleep apnea. Apnea means, \"without breath.\"  Apnea is most often caused by narrowing or collapse of the upper airway as muscles relax during sleep.   Almost everyone has occasional apneas. Most people with sleep apnea have had brief interruptions at night frequently for many years.  The severity of sleep apnea is related to how frequent and severe the events are.   2. What are the consequences of BRIT? Symptoms include: feeling sleepy during the day, snoring loudly, gasping or stopping of breathing, trouble sleeping, and occasionally morning headaches or heartburn at night.  Sleepiness can be serious and even increase the risk of falling asleep while driving. Other health consequences may include development of high blood pressure and other cardiovascular disease in persons who are susceptible. Untreated BRIT  can contribute to heart disease, stroke and diabetes.   3. What are the treatment options? In most situations, sleep apnea is a lifelong disease that must be managed with daily therapy. Medications are not effective for sleep apnea " and surgery is generally not considered until other therapies have been tried. Your treatment is your choice . Continuous Positive Airway (CPAP) works right away and is the therapy that is effective in nearly everyone. An oral device to hold your jaw forward is usually the next most reliable option. Other options include postioning devices (to keep you off your back), weight loss, and surgery including a tongue pacing device. There is more detail about some of these options below.    Important tips for using CPAP and similar devices   Know your equipment:  CPAP is continuous positive airway pressure that prevents obstructive sleep apnea by keeping the throat from collapsing while you are sleeping. In most cases, the device is  smart  and can slowly self-adjusts if your throat collapses and keeps a record every day of how well you are treated-this information is available to you and your care team.  BPAP is bilevel positive airway pressure that keeps your throat open and also assists each breath with a pressure boost to maintain adequate breathing.  Special kinds of BPAP are used in patients who have inadequate breathing from lung or heart disease. In most cases, the device is  smart  and can slowly self-adjusts to assist breathing. Like CPAP, the device keeps a record of how well you are treated.  Your mask is your connection to the device. You get to choose what feels most comfortable and the staff will help to make sure if fits. Here: are some examples of the different masks that are available:       Key points to remember on your journey with sleep apnea:  1. Sleep study.  PAP devices often need to be adjusted during a sleep study to show that they are effective and adjusted right.  2. Good tips to remember: Try wearing just the mask during a quiet time during the day so your body adapts to wearing it. A humidifier is recommended for comfort in most cases to prevent drying of your nose and throat. Allergy  medication from your provider may help you if you are having nasal congestion.  3. Getting settled-in. It takes more than one night for most of us to get used to wearing a mask. Try wearing just the mask during a quiet time during the day so your body adapts to wearing it. A humidifier is recommended for comfort in most cases. Our team will work with you carefully on the first day and will be in contact within 4 days and again at 2 and 4 weeks for advice and remote device adjustments. Your therapy is evaluated by the device each day.   4. Use it every night. The more you are able to sleep naturally for 7-8 hours, the more likely you will have good sleep and to prevent health risks or symptoms from sleep apnea. Even if you use it 4 hours it helps. Occasionally all of us are unable to use a medical therapy, in sleep apnea, it is not dangerous to miss one night.   5. Communicate. Call our skilled team on the number provided on the first day if your visit for problems that make it difficult to wear the device. Over 2 out of 3 patients can learn to wear the device long-term with help from our team. Remember to call our team or your sleep providers if you are unable to wear the device as we may have other solutions for those who cannot adapt to mask CPAP therapy. It is recommended that you sleep your sleep provider within the first 3 months and yearly after that if you are not having problems.   Take care of your equipment. Make sure you clean your mask and tubing using directions every day and that your filter and mask are replaced as recommended or if they are not working.     BESIDES CPAP, WHAT OTHER THERAPIES ARE THERE?    Positioning Device  Positioning devices are generally used when sleep apnea is mild and only occurs on your back.This example shows a pillow that straps around the waist. It may be appropriate for those whose sleep study shows milder sleep apnea that occurs primarily when lying flat on one's back.  Preliminary studies have shown benefit but effectiveness at home may need to be verified by a home sleep test. These devices are generally not covered by medical insurance.    Oral Appliance  What is oral appliance therapy?  An oral appliance device fits on your teeth at night like a retainer used after having braces. The device is made by a specialized dentist and requires several visits over 1-2 months before a manufactured device is made to fit your teeth and is adjusted to prevent your sleep apnea. Once an oral device is working properly, snoring should be improved. A home sleep test may be recommended at that time if to determine whether the sleep apnea is adequately treated.       Some things to remember:  -Oral devices are often, but not always, covered by your medical insurance. Be sure to check with your insurance provider.   -If you are referred for oral therapy, you will be given a list of specialized dentists to consider or you may choose to visit the Web site of the American Academy of Dental Sleep Medicine  -Oral devices are less likely to work if you have severe sleep apnea or are extremely overweight.     More detailed information  An oral appliance is a small acrylic device that fits over the upper and lower teeth  (similar to a retainer or a mouth guard). This device slightly moves jaw forward, which moves the base of the tongue forward, opens the airway, improves breathing for effective treat snoring and obstructive sleep apnea in perhaps 7 out of 10 people .  The best working devices are custom-made by a dental device  after a mold is made of the teeth 1, 2, 3.  When is an oral appliance indicated?  Oral appliance therapy is recommended as a first-line treatment for patients with primary snoring, mild sleep apnea, and for patients with moderate sleep apnea who prefer appliance therapy to use of CPAP4, 5. Severity of sleep apnea is determined by sleep testing and is based on the number  of respiratory events per hour of sleep.   How successful is oral appliance therapy?  The success rate of oral appliance therapy in patients with mild sleep apnea is 75-80% while in patients with moderate sleep apnea it is 50-70%. The chance of success in patients with severe sleep apnea is 40-50%. The research also shows that oral appliances have a beneficial effect on the cardiovascular health of BRIT patients at the same magnitude as CPAP therapy7.  Oral appliances should be a second-line treatment in cases of severe sleep apnea, but if not completely successful then a combination therapy utilizing CPAP plus oral appliance therapy may be effective. Oral appliances tend to be effective in a broad range of patients although studies show that the patients who have the highest success are females, younger patients, those with milder disease, and less severe obesity. 3, 6.   Finding a dentist that practices dental sleep medicine  Specific training is available through the American Academy of Dental Sleep Medicine for dentists interested in working in the field of sleep. To find a dentist who is educated in the field of sleep and the use of oral appliances, near you, visit the Web site of the American Academy of Dental Sleep Medicine.    References  1. Wilman et al. Objectively measured vs self-reported compliance during oral appliance therapy for sleep-disordered breathing. Chest 2013; 144(5): 1997-1630.  2. Herson et al. Objective measurement of compliance during oral appliance therapy for sleep-disordered breathing. Thorax 2013; 68(1): 91-96.  3. Francesca, et al. Mandibular advancement devices in 620 men and women with BRIT and snoring: tolerability and predictors of treatment success. Chest 2004; 125: 6028-8909.  4. Jennifer, et al. Oral appliances for snoring and BRIT: a review. Sleep 2006; 29: 244-262.  5. Olinda et al. Oral appliance treatment for BRIT: an update. J Clin Sleep Med 2014; 10(2):  215-227.  6. katie Ledezma al. Predictors of OSAH treatment outcome. J Dent Res 2007; 86: 5751-3286.      Weight Loss:    Weight loss is a long-term strategy that may improve sleep apnea in some patients.    Weight management is a personal decision.  If you are interested in exploring weight loss strategies, the following discussion covers the impact on weight loss on sleep apnea and the approaches that may be successful.    Weight loss decreases severity of sleep apnea in most people with obesity. For those with mild obesity who have developed snoring with weight gain, even 15-30 pound weight loss can improve and occasionally eliminate sleep apnea.  Structured and life-long dietary and health habits are necessary to lose weight and keep healthier weight levels.     Though there may be significant health benefits from weight loss, long-term weight loss is very difficult to achieve- studies show success with dietary management in less than 10% of people. In addition, substantial weight loss may require years of dietary control and may be difficult if patients have severe obesity. In these cases, surgical management may be considered.  Finally, older individuals who have tolerated obesity without health complications may be less likely to benefit from weight loss strategies.      Your BMI is Body mass index is 35.22 kg/(m^2).  Weight management is a personal decision.  If you are interested in exploring weight loss strategies, the following discussion covers the approaches that may be successful. Body mass index (BMI) is one way to tell whether you are at a healthy weight, overweight, or obese. It measures your weight in relation to your height.  A BMI of 18.5 to 24.9 is in the healthy range. A person with a BMI of 25 to 29.9 is considered overweight, and someone with a BMI of 30 or greater is considered obese. More than two-thirds of American adults are considered overweight or obese.  Being overweight or obese  increases the risk for further weight gain. Excess weight may lead to heart disease and diabetes.  Creating and following plans for healthy eating and physical activity may help you improve your health.  Weight control is part of healthy lifestyle and includes exercise, emotional health, and healthy eating habits. Careful eating habits lifelong are the mainstay of weight control. Though there are significant health benefits from weight loss, long-term weight loss with diet alone may be very difficult to achieve- studies show long-term success with dietary management in less than 10% of people. Attaining a healthy weight may be especially difficult to achieve in those with severe obesity. In some cases, medications, devices and surgical management might be considered.  What can you do?  If you are overweight or obese and are interested in methods for weight loss, you should discuss this with your provider.     Consider reducing daily calorie intake by 500 calories.     Keep a food journal.     Avoiding skipping meals, consider cutting portions instead.    Diet combined with exercise helps maintain muscle while optimizing fat loss. Strength training is particularly important for building and maintaining muscle mass. Exercise helps reduce stress, increase energy, and improves fitness. Increasing exercise without diet control, however, may not burn enough calories to loose weight.       Start walking three days a week 10-20 minutes at a time    Work towards walking thirty minutes five days a week     Eventually, increase the speed of your walking for 1-2 minutes at time    In addition, we recommend that you review healthy lifestyles and methods for weight loss available through the National Institutes of Health patient information sites:  http://win.niddk.nih.gov/publications/index.htm    And look into health and wellness programs that may be available through your health insurance provider, employer, local community  [place], or mar club.    Weight management plan: Patient was referred to their PCP to discuss a diet and exercise plan.    Surgery:    Surgery for obstructive sleep apnea is considered generally only when other therapies fail to work. Surgery may be discussed with you if you are having a difficult time tolerating CPAP and or when there is an abnormal structure that requires surgical correction.  Nose and throat surgeries often enlarge the airway to prevent collapse.  Most of these surgeries create pain for 1-2 weeks and up to half of the most common surgeries are not effective throughout life.  You should carefully discuss the benefits and drawbacks to surgery with your sleep provider and surgeon to determine if it is the best solution for you.   More information  Surgery for BRIT is directed at areas that are responsible for narrowing or complete obstruction of the airway during sleep.  There are a wide range of procedures available to enlarge and/or stabilize the airway to prevent blockage of breathing in the three major areas where it can occur: the palate, tongue, and nasal regions.  Successful surgical treatment depends on the accurate identification of the factors responsible for obstructive sleep apnea in each person.  A personalized approach is required because there is no single treatment that works well for everyone.  Because of anatomic variation, consultation with an examination by a sleep surgeon is a critical first step in determining what surgical options are best for each patient.  In some cases, examination during sedation may be recommended in order to guide the selection of procedures.  Patients will be counseled about risks and benefits as well as the typical recovery course after surgery. Surgery is typically not a cure for a person s BRIT.  However, surgery will often significantly improve one s BRIT severity (termed  success rate ).  Even in the absence of a cure, surgery will decrease the  cardiovascular risk associated with OSA7; improve overall quality of life8 (sleepiness, functionality, sleep quality, etc).  Palate Procedures:  Patients with BRIT often have narrowing of their airway in the region of their tonsils and uvula.  The goals of palate procedures are to widen the airway in this region as well as to help the tissues resist collapse.  Modern palate procedure techniques focus on tissue conservation and soft tissue rearrangement, rather than tissue removal.  Often the uvula is preserved in this procedure. Residual sleep apnea is common in patient after pharyngoplasty with an average reduction in sleep apnea events of 33%2.    Tongue Procedures:  ExamWhile patients are awake, the muscles that surround the throat are active and keep this region open for breathing. These muscles relax during sleep, allowing the tongue and other structures to collapse and block breathing.  There are several different tongue procedures available.  Selection of a tongue base procedure depends on characteristics seen on physical exam.  Generally, procedures are aimed at removing bulky tissues in this area or preventing the back of the tongue from falling back during sleep.  Success rates for tongue surgery range from 50-62%3.  Hypoglossal Nerve Stimulation:  Hypoglossal nerve stimulation has recently received approval from the [place] Food and Drug Administration for the treatment of obstructive sleep apnea.  This is based on research showing that the system was safe and effective in treating sleep apnea6.  Results showed that the median AHI score decreased 68%, from 29.3 to 9.0. This therapy uses an implant system that senses breathing patterns and delivers mild stimulation to airway muscles, which keeps the airway open during sleep.  The system consists of three fully implanted components: a small generator (similar in size to a pacemaker), a breathing sensor, and a stimulation lead.  Using a small handheld  remote, a patient turns the therapy on before bed and off upon awakening.    Candidates for this device must be greater than 22 years of age, have moderate to severe BRIT (AHI between 20-65), BMI less than 32, have tried CPAP/oral appliance without success, and have appropriate upper airway anatomy (determined by a sleep endoscopy performed by Dr. Ramos).  Hypoglossal Nerve Stimulation Pathway:    The sleep surgeon s office will work with the patient through the insurance prior-authorization process (including communications and appeals).    Nasal Procedures:  Nasal obstruction can interfere with nasal breathing during the day and night.  Studies have shown that relief of nasal obstruction can improve the ability of some patients to tolerate positive airway pressure therapy for obstructive sleep apnea1.  Treatment options include medications such as nasal saline, topical corticosteroid and antihistamine sprays, and oral medications such as antihistamines or decongestants. Non-surgical treatments can include external nasal dilators for selected patients. If these are not successful by themselves, surgery can improve the nasal airway either alone or in combination with these other options.  Combination Procedures:  Combination of surgical procedures and other treatments may be recommended, particularly if patients have more than one area of narrowing or persistent positional disease.  The success rate of combination surgery ranges from 66-80%2,3.    References  1. Radha HENRY. The Role of the Nose in Snoring and Obstructive Sleep Apnoea: An Update.  Eur Arch Otorhinolaryngol. 2011; 268: 1365-73.  2.  Evaristo SM; Ricarda JA; Samina JR; Pallanch JF; Geo MB; Tootie SG; Yogesh CABALLERO. Surgical modifications of the upper airway for obstructive sleep apnea in adults: a systematic review and meta-analysis. SLEEP 2010;33(10):7427-1920. Ariella SRIVASTAVA. Hypopharyngeal surgery in obstructive sleep apnea: an evidence-based medicine  review.  Arch Otolaryngol Head Neck Surg. 2006 Feb;132(2):206-13.  3. Suhail YH1, Kimberly Y, Wong TJ. The efficacy of anatomically based multilevel surgery for obstructive sleep apnea. Otolaryngol Head Neck Surg. 2003 Oct;129(4):327-35.  4. Ariella SRIVASTAVA, Goldberg A. Hypopharyngeal Surgery in Obstructive Sleep Apnea: An Evidence-Based Medicine Review. Arch Otolaryngol Head Neck Surg. 2006 Feb;132(2):206-13.  5. Jae ZHU et al. Upper-Airway Stimulation for Obstructive Sleep Apnea.  N Engl J Med. 2014 Jan 9;370(2):139-49.  6. David Y et al. Increased Incidence of Cardiovascular Disease in Middle-aged Men with Obstructive Sleep Apnea. Am J Respir Crit Care Med; 2002 166: 159-165  7. Flash HANKINS et al. Studying Life Effects and Effectiveness of Palatopharyngoplasty (SLEEP) study: Subjective Outcomes of Isolated Uvulopalatopharyngoplasty. Otolaryngol Head Neck Surg. 2011; 144: 623-631.                        Follow-ups after your visit        Your next 10 appointments already scheduled     Aug 03, 2017  1:00 PM CDT   HST  with BED 7  SLEEP   [facility])    [address] [address]   [phone]            Aug 03, 2017  4:00 PM CDT   Pre-[place] Dilan [place]   [facility] [place] [address]   [phone]            Aug 04, 2017  9:00 AM CDT   HST Drop Off with  SLEEP CENTER DME   [facility])    [address] [address]   [phone]            Aug 16, 2017  1:30 PM CDT   Return Sleep Patient with Bennett Ezra Goltz, PA-C   [facility])    [address] [id number] [phone]              Future tests that were ordered for you today     Open Future Orders        Priority Expected  "Expires Ordered    HST-Home Sleep Apnea Test Routine  1/20/2018 7/21/2017            Who to contact     If you have questions or need follow up information about today's clinic visit or your schedule please contact [facility] directly at [phone].  Normal or non-critical lab and imaging results will be communicated to you by MyChart, letter or phone within 4 business days after the clinic has received the results. If you do not hear from us within 7 days, please contact the clinic through [employer] or phone. If you have a critical or abnormal lab result, we will notify you by phone as soon as possible.  Submit refill requests through [employer] or call your pharmacy and they will forward the refill request to us. Please allow 3 business days for your refill to be completed.          Additional Information About Your Visit        [employer] Information     [employer] gives you secure access to your electronic health record. If you see a primary care provider, you can also send messages to your care team and make appointments. If you have questions, please call your primary care clinic.  If you do not have a primary care provider, please call [phone] and they will assist you.        Care EveryWhere ID     This is your Care EveryWhere ID. This could be used by other organizations to access your [facility] medical records  [id number]        Your Vitals Were     Pulse Respirations Height Last Period Pulse Oximetry BMI (Body Mass Index)    69 16 1.6 m (5' 3\") 02/23/2013 98% 35.22 kg/m2       Blood Pressure from Last 3 Encounters:   07/21/17 148/80   07/14/17 134/86   05/19/17 160/84    Weight from Last 3 Encounters:   07/21/17 90.2 kg (198 lb 12.8 oz)   07/14/17 90.3 kg (199 lb)   05/19/17 89.4 kg (197 lb)              We Performed the Following     SLEEP EVALUATION & MANAGEMENT REFERRAL - ADULT        Primary Care Provider Office Phone # Fax #    Dilan Soto [phone]       [facility] " [place] [id number] [facility] [address]        Equal Access to Services     LYDIA WALKER : Hadii becka Armstrong, wasalda luqadaha, qaybta kaalmada richarjackieterra, waxay idiin hayrubenmargie calderonyobani mitchellogannitin ruano. So [facility] [phone].    ATENCIÓN: Si habla español, tiene a gil disposición servicios gratuitos de asistencia lingüística. Llame al [phone].    We comply with applicable federal civil rights laws and [place] laws. We do not discriminate on the basis of race, color, national origin, age, disability sex, sexual orientation or gender identity.            Thank you!     Thank you for choosing [facility]  for your care. Our goal is always to provide you with excellent care. Hearing back from our patients is one way we can continue to improve our services. Please take a few minutes to complete the written survey that you may receive in the mail after your visit with us. Thank you!             Your Updated Medication List - Protect others around you: Learn how to safely use, store and throw away your medicines at www.disposemymeds.org.          This list is accurate as of: 7/21/17 12:21 PM.  Always use your most recent med list.                   Brand Name Dispense Instructions for use Diagnosis    ACETAMINOPHEN PO      Take 1,500 mg by mouth daily as needed for pain        candesartan cilexetil 32 MG Tabs     90 tablet    Take 32 mg by mouth daily    Benign essential hypertension       carvedilol 25 MG tablet    COREG    60 tablet    Take 1 tablet (25 mg) by mouth 2 times daily (with meals)    Benign essential hypertension       letrozole 2.5 MG tablet    FEMARA     Take 2.5 mg by mouth At Bedtime        VITAMIN B12 PO      Take by mouth daily        VITAMIN D (CHOLECALCIFEROL) PO      Take 3,000 Units by mouth daily

## 2017-08-03 ENCOUNTER — OFFICE VISIT (OUTPATIENT)
Dept: FAMILY MEDICINE | Facility: CLINIC | Age: 57
End: 2017-08-03
Payer: COMMERCIAL

## 2017-08-03 ENCOUNTER — OFFICE VISIT (OUTPATIENT)
Dept: SLEEP MEDICINE | Facility: CLINIC | Age: 57
End: 2017-08-03
Payer: COMMERCIAL

## 2017-08-03 VITALS
OXYGEN SATURATION: 100 % | HEIGHT: 63 IN | TEMPERATURE: 97.9 F | HEART RATE: 74 BPM | RESPIRATION RATE: 14 BRPM | BODY MASS INDEX: 35.26 KG/M2 | SYSTOLIC BLOOD PRESSURE: 131 MMHG | DIASTOLIC BLOOD PRESSURE: 82 MMHG | WEIGHT: 199 LBS

## 2017-08-03 DIAGNOSIS — Z01.818 PREOP GENERAL PHYSICAL EXAM: ICD-10-CM

## 2017-08-03 DIAGNOSIS — Z12.31 VISIT FOR SCREENING MAMMOGRAM: ICD-10-CM

## 2017-08-03 DIAGNOSIS — G47.33 OSA (OBSTRUCTIVE SLEEP APNEA): Primary | ICD-10-CM

## 2017-08-03 PROCEDURE — 99214 OFFICE O/P EST MOD 30 MIN: CPT | Performed by: FAMILY MEDICINE

## 2017-08-03 PROCEDURE — G0399 HOME SLEEP TEST/TYPE 3 PORTA: HCPCS | Performed by: INTERNAL MEDICINE

## 2017-08-03 NOTE — PROGRESS NOTES
[facility] [place]  [address] [address]  [phone]  Dept: [phone]    PRE-OP EVALUATION:  Today's date: 8/3/2017    Theresa M Delosreyes (: 1960) presents for pre-operative evaluation assessment as requested by Dr. Alhaji Ugalde.  She requires evaluation and anesthesia risk assessment prior to undergoing surgery/procedure.  Proposed procedure: Rotator cuff repair.    Date of Surgery/ Procedure: 8/10/17  Time of Surgery/ Procedure: 6:00 AM  Hospital/Surgical Facility: [facility]   Fax number for surgical facility: [phone]  Primary Physician: Dilan Soto  Type of Anesthesia Anticipated: to be determined    Patient has a Health Care Directive or Living Will:  YES    1. NO - Do you have a history of heart attack, stroke, stent, bypass or surgery on an artery in the head, neck, heart or legs?  2. NO - Do you ever have any pain or discomfort in your chest?  3. NO - Do you have a history of  Heart Failure?  4. NO - Are you troubled by shortness of breath when: walking on the level, up a slight hill or at night?  5. NO - Do you currently have a cold, bronchitis or other respiratory infection?  6. NO - Do you have a cough, shortness of breath or wheezing?  7. NO - Do you sometimes get pains in the calves of your legs when you walk?  8. NO - Do you or anyone in your family have previous history of blood clots?  9. NO - Do you or does anyone in your family have a serious bleeding problem such as prolonged bleeding following surgeries or cuts?  10. NO - Have you ever had problems with anemia or been told to take iron pills?  11. NO - Have you had any abnormal blood loss such as black, tarry or bloody stools, or abnormal vaginal bleeding?  12. NO - Have you ever had a blood transfusion?  13. NO - Have you or any of your relatives ever had problems with anesthesia?  14. YES - Do you have sleep apnea, excessive snoring or daytime drowsiness?  15. NO -  Do you have any prosthetic heart valves?  16. NO - Do you have prosthetic joints?  17. NO - Is there any chance that you may be pregnant?        HPI:                                                      See problem list for active medical problems.  Problems all longstanding and stable, except as noted/documented.  See ROS for pertinent symptoms related to these conditions.                                                                                                  .    MEDICAL HISTORY:                                                    Patient Active Problem List    Diagnosis Date Noted     Hypertension goal BP (blood pressure) < 140/90 07/13/2013     Priority: High     Hyperlipidemia LDL goal <130 03/05/2013     Priority: High     Osteoarthritis of both knees      Priority: High     Obesity (BMI 30-39.9)      Priority: High     BRIT (obstructive sleep apnea)      Priority: High     not using CPAP, treated with tonsilliectomy       Female stress incontinence 12/18/2006     Priority: High     Benign essential hypertension 05/19/2017     Priority: Medium     Drugs and medicinal substances causing adverse effect in therapeutic use, initial encounter 05/19/2017     Priority: Medium     Breast cancer (H) 04/13/2016     Priority: Medium     Infiltrating ductal carcinoma of right female breast (H) 10/20/2015     Priority: Medium     Antineoplastic antibiotics causing adverse effect in therapeutic use 10/20/2015     Priority: Medium     Chest pain 05/01/2015     Priority: Medium     Status post laparoscopic cholecystectomy 01/01/2005     Priority: Low     Cholelithiasis with mild subacute hemorrhagic cholecystitis        Past Medical History:   Diagnosis Date     Benign hypertension     started medication 3/13     Cancer (H)     breast cancer     Chest pain     side effect of Femera     Female stress incontinence 12/18/2006    s/p sling procedure     Mixed hyperlipidemia 2013     Motion sickness      Obesity (BMI  "30-39.9)      BRIT (obstructive sleep apnea)     not using CPAP, treated with tonsilliectomy     Osteoarthritis of both knees      PONV (postoperative nausea and vomiting)      Status post laparoscopic cholecystectomy 2005    Cholelithiasis with mild subacute hemorrhagic cholecystitis     Supervision of other normal pregnancy      - vaginal     Uncomplicated asthma     with respiratory infections     Past Surgical History:   Procedure Laterality Date     COLONOSCOPY  2006    ormal to lt transverse colon, incomplete due to \"extremely redundant colon\"\"     D & C  2004     EXTRACT LENS CLEAR, EXCHANGE LENS REFRACTIVE, COMBINED  3/2013    bilateral     GRAFT FAT TO BREAST Bilateral 2016    Procedure: GRAFT FAT TO BREAST;  Surgeon: Alhaji Patton MD;  [facility]  [facility]     GRAFT FREE VASCULARIZED TRANSVERSE RECTUS ABDOMINIS MYOCUTANEOUS Bilateral 2016    Procedure: GRAFT FREE VASCULARIZED TRANSVERSE RECTUS ABDOMINIS MYOCUTANEOUS;  Surgeon: Alhaji Patton MD;  Location:  OR     HC ABLATION, ENDOMETRIAL, THERMAL, W/O HYSTEROSCOPIC GUIDANCE  2004    NovaSure endometrial ablation.      HC COLON AIR CONTRAST  2006    normal     HC SLING OPERATION FOR STRESS INCONTINENCE  2007    [facility] sling procedure     INSERT PORT VASCULAR ACCESS Left 2015    Procedure: INSERT PORT VASCULAR ACCESS;  Surgeon: August Rdz MD;  [facility]:  [facility]     INSERT TISSUE EXPANDER BREAST BILATERAL Bilateral 2015    Procedure: INSERT TISSUE EXPANDER BREAST BILATERAL;  Surgeon: Alhaji Patton MD;  Location:  OR     LAPAROSCOPIC CHOLECYSTECTOMY WITH CHOLANGIOGRAMS  2005    Laparoscopic cholecystectomy with intraoperative  cholangiogram     MASTECTOMY SIMPLE BILATERAL, SENTINEL NODE BILATERAL, COMBINED Bilateral 2015    Procedure: COMBINED MASTECTOMY SIMPLE BILATERAL, SENTINEL NODE BILATERAL;  Surgeon: August Rdz MD;  Location:  OR     RECONSTRUCT " BREAST BILATERAL Bilateral 11/18/2016    Procedure: RECONSTRUCT BREAST BILATERAL;  Surgeon: Alhaji Patton MD;  Location: [facility]     REMOVE PORT VASCULAR ACCESS N/A 11/18/2016    Procedure: REMOVE PORT VASCULAR ACCESS;  Surgeon: Alhaji Patton MD;  Location: [facility]     REMOVE TISSUE EXPANDER BREAST Bilateral 4/12/2016    Procedure: REMOVE TISSUE EXPANDER BREAST;  Surgeon: Alhaji Patton MD;  Location:  OR     REVISE SCAR TRUNK N/A 11/18/2016    Procedure: REVISE SCAR TRUNK;  Surgeon: Alhaji Patton MD;  Location: [facility]     TONSILLECTOMY  2001     VASCULAR SURGERY      insert port and removed     Current Outpatient Prescriptions   Medication Sig Dispense Refill     Cyanocobalamin (VITAMIN B12 PO) Take by mouth daily       carvedilol (COREG) 25 MG tablet Take 1 tablet (25 mg) by mouth 2 times daily (with meals) 60 tablet 11     candesartan 32 MG TABS Take 32 mg by mouth daily 90 tablet 3     VITAMIN D, CHOLECALCIFEROL, PO Take 3,000 Units by mouth daily       letrozole (FEMARA) 2.5 MG tablet Take 2.5 mg by mouth At Bedtime   0     ACETAMINOPHEN PO Take 1,500 mg by mouth daily as needed for pain       OTC products: None, except as noted above    Allergies   Allergen Reactions     Definity Other (See Comments)     A substance that helps with imaging the heart muscle with echocardiology    Full body ache starting in the low back.      Keflex [Cephalexin]      Hand swelling, blisters???      Latex Allergy: NO    Social History   Substance Use Topics     Smoking status: Never Smoker     Smokeless tobacco: Never Used     Alcohol use No     History   Drug Use No       REVIEW OF SYSTEMS:                                                    C: NEGATIVE for fever, chills, change in weight  E/M: NEGATIVE for ear, mouth and throat problems  R: NEGATIVE for significant cough or SOB  CV: NEGATIVE for chest pain, palpitations or peripheral edema    EXAM:                                        "             /82 (Cuff Size: Adult Large)  Pulse 74  Temp 97.9  F (36.6  C) (Tympanic)  Resp 14  Ht 5' 3\" (1.6 m)  Wt 199 lb (90.3 kg)  LMP 02/23/2013  SpO2 100%  BMI 35.25 kg/m2  GENERAL APPEARANCE: healthy, alert and no distress  HENT: ear canals and TM's normal and nose and mouth without ulcers or lesions  RESP: lungs clear to auscultation - no rales, rhonchi or wheezes  CV: regular rate and rhythm, normal S1 S2, no S3 or S4 and no murmur, click or rub   ABDOMEN: soft, nontender, no HSM or masses and bowel sounds normal  NEURO: Normal strength and tone, sensory exam grossly normal, mentation intact and speech normal    DIAGNOSTICS:                                                      Labs Resulted Today:   Results for orders placed or performed in visit on 07/03/17   Comprehensive metabolic panel (BMP + Alb, Alk Phos, ALT, AST, Total. Bili, TP)   Result Value Ref Range    Sodium 142 133 - 144 mmol/L    Potassium 4.0 3.4 - 5.3 mmol/L    Chloride 106 94 - 109 mmol/L    Carbon Dioxide 28 20 - 32 mmol/L    Anion Gap 8 3 - 14 mmol/L    Glucose 100 (H) 70 - 99 mg/dL    Urea Nitrogen 9 7 - 30 mg/dL    Creatinine 0.62 0.52 - 1.04 mg/dL    GFR Estimate >90  Non  GFR Calc   >60 mL/min/1.7m2    GFR Estimate If Black >90   GFR Calc   >60 mL/min/1.7m2    Calcium 9.2 8.5 - 10.1 mg/dL    Bilirubin Total 0.7 0.2 - 1.3 mg/dL    Albumin 3.9 3.4 - 5.0 g/dL    Protein Total 7.7 6.8 - 8.8 g/dL    Alkaline Phosphatase 83 40 - 150 U/L    ALT 29 0 - 50 U/L    AST 16 0 - 45 U/L   Lipid Profile with reflex to direct LDL   Result Value Ref Range    Cholesterol 204 (H) <200 mg/dL    Triglycerides 146 <150 mg/dL    HDL Cholesterol 65 >49 mg/dL    LDL Cholesterol Calculated 110 (H) <100 mg/dL    Non HDL Cholesterol 139 (H) <130 mg/dL   Hepatitis C antibody   Result Value Ref Range    Hepatitis C Antibody  NR     Nonreactive   Assay performance characteristics have not been established for " newborns,   infants, and children       Echocardiogram(4-3-17)  Interpretation Summary     The left ventricle is normal in size.  There is normal left ventricular wall thickness.  The visual ejection fraction is estimated at 55-60%.  The transmitral spectral Doppler flow pattern is suggestive of diastolic  dysfunction of the left ventricle.  No regional wall motion abnormalities noted.  The right ventricle is normal in structure, function and size.  The mitral valve leaflets are mildly thickened.  There is trace mitral regurgitation.  There is trace tricuspid regurgitation.  There is trivial trileaflet aortic sclerosis.  The rhythm was normal sinus.  The study was technically difficult. Limited views were obtained. Compared to  prior study, there is no significant change. No contrast used due to reaction  to Definity.      Recent Labs   Lab Test  07/03/17   0824  02/15/17   1552   11/16/16   1143  04/15/16   0800  04/12/16   1955   04/08/16   1010  12/07/15   1026   HGB   --    --    --   13.8   --    --    --   12.8  12.7   PLT   --    --    --    --   233  266   --   327  394   INR   --    --    --    --    --    --    --    --   0.93   NA  142  140   < >  136   --    --    --   140   --    POTASSIUM  4.0  4.3   < >  3.8   --    --    < >  4.0   --    CR  0.62  0.80   < >  0.72   --   0.66   --   0.68   --     < > = values in this interval not displayed.        IMPRESSION:                                                    Reason for surgery/procedure: rotator cuff repair     The proposed surgical procedure is considered LOW risk.    REVISED CARDIAC RISK INDEX  The patient has the following serious cardiovascular risks for perioperative complications such as (MI, PE, VFib and 3  AV Block):  No serious cardiac risks  INTERPRETATION: 0 risks: Class I (very low risk - 0.4% complication rate)    The patient has the following additional risks for perioperative complications:  No identified additional risks       ICD-10-CM    1. Visit for screening mammogram Z12.31 MA SCREENING DIGITAL BILAT - Future  (s+30)     MA SCREENING DIGITAL BILAT - Future  (s+30)   2. Preop general physical exam Z01.818        RECOMMENDATIONS:                                                        --Patient is to take all scheduled medications on the day of surgery EXCEPT for modifications listed below.    Anticoagulant or Antiplatelet Medication Use  NSAIDS: Naproxen (Naprosyn):   Stop 2-3 days prior to surgery          APPROVAL GIVEN to proceed with proposed procedure, without further diagnostic evaluation       Signed Electronically by: Dilan Soto MD    Copy of this evaluation report is provided to requesting physician.    [facility] Preop Guidelines

## 2017-08-03 NOTE — PROGRESS NOTES
Pt was instructed in the use of device.  Pt verbalized understanding.  Device is set to begin recording at 8:30 p.m.  Pt to return device in a.m.

## 2017-08-03 NOTE — NURSING NOTE
"Chief Complaint   Patient presents with     Pre-Op Exam       Initial /82 (Cuff Size: Adult Large)  Pulse 74  Temp 97.9  F (36.6  C) (Tympanic)  Resp 14  Ht 5' 3\" (1.6 m)  Wt 199 lb (90.3 kg)  LMP 02/23/2013  SpO2 100%  BMI 35.25 kg/m2 Estimated body mass index is 35.25 kg/(m^2) as calculated from the following:    Height as of this encounter: 5' 3\" (1.6 m).    Weight as of this encounter: 199 lb (90.3 kg).  Medication Reconciliation: complete   Jaquelin Weir, CMA    "

## 2017-08-03 NOTE — MR AVS SNAPSHOT
After Visit Summary   8/3/2017    Theresa M Delosreyes    MRN: [id number]           Patient Information     Date Of Birth          1960        Visit Information        Provider Department      8/3/2017 4:00 PM Dilan Soto MD [facility] [place]        Today's Diagnoses     Visit for screening mammogram        Preop general physical exam          Care Instructions      Before Your Surgery      Call your surgeon if there is any change in your health. This includes signs of a cold or flu (such as a sore throat, runny nose, cough, rash or fever).    Do not smoke, drink alcohol or take over the counter medicine (unless your surgeon or primary care doctor tells you to) for the 24 hours before and after surgery.    If you take prescribed drugs: Follow your doctor s orders about which medicines to take and which to stop until after surgery.    Eating and drinking prior to surgery: follow the instructions from your surgeon    Take a shower or bath the night before surgery. Use the soap your surgeon gave you to gently clean your skin. If you do not have soap from your surgeon, use your regular soap. Do not shave or scrub the surgery site.  Wear clean pajamas and have clean sheets on your bed.           Follow-ups after your visit        Your next 10 appointments already scheduled     Aug 04, 2017  9:00 AM CDT   H[facility] Drop Off with  [facility]   [facility])    [address] [address]   [phone]            Aug 16, 2017  1:30 PM CDT   Return Sleep Patient with Bennett Ezra Goltz, PA-C   [facility])    [address] [address]   [phone]              Who to contact     If you have questions or need follow up information about today's clinic visit or your schedule please contact [facility] [place] directly at  "[phone].  Normal or non-critical lab and imaging results will be communicated to you by MyChart, letter or phone within 4 business days after the clinic has received the results. If you do not hear from us within 7 days, please contact the clinic through [employer] or phone. If you have a critical or abnormal lab result, we will notify you by phone as soon as possible.  Submit refill requests through [employer] or call your pharmacy and they will forward the refill request to us. Please allow 3 business days for your refill to be completed.          Additional Information About Your Visit        [employer] Information     [employer] gives you secure access to your electronic health record. If you see a primary care provider, you can also send messages to your care team and make appointments. If you have questions, please call your primary care clinic.  If you do not have a primary care provider, please call [phone] and they will assist you.        Care EveryWhere ID     This is your Care EveryWhere ID. This could be used by other organizations to access your [facility] medical records  [id number]        Your Vitals Were     Pulse Temperature Respirations Height Last Period Pulse Oximetry    74 97.9  F (36.6  C) (Tympanic) 14 5' 3\" (1.6 m) 02/23/2013 100%    BMI (Body Mass Index)                   35.25 kg/m2            Blood Pressure from Last 3 Encounters:   08/03/17 131/82   07/21/17 148/80   07/14/17 134/86    Weight from Last 3 Encounters:   08/03/17 199 lb (90.3 kg)   07/21/17 198 lb 12.8 oz (90.2 kg)   07/14/17 199 lb (90.3 kg)              Today, you had the following     No orders found for display       Primary Care Provider Office Phone # Fax #    Dilan Soto [phone]       [address] [address]        Equal Access to Services     LYDIA WALKER AH: Hadii becka Armstrong, waaxda luqadaha, qaybta kaalmada adeegyaterra, chastity car " richar grullonlogannitin esparza'aamargie ah. So [facility] [phone].    ATENCIÓN: Si habla maryam, tiene a gil disposición servicios gratuitos de asistencia lingüística. Celine al [phone].    We comply with applicable federal civil rights laws and [place] laws. We do not discriminate on the basis of race, color, national origin, age, disability sex, sexual orientation or gender identity.            Thank you!     Thank you for choosing [facility] [place]  for your care. Our goal is always to provide you with excellent care. Hearing back from our patients is one way we can continue to improve our services. Please take a few minutes to complete the written survey that you may receive in the mail after your visit with us. Thank you!             Your Updated Medication List - Protect others around you: Learn how to safely use, store and throw away your medicines at www.disposemymeds.org.          This list is accurate as of: 8/3/17  4:23 PM.  Always use your most recent med list.                   Brand Name Dispense Instructions for use Diagnosis    ACETAMINOPHEN PO      Take 1,500 mg by mouth daily as needed for pain        candesartan cilexetil 32 MG Tabs     90 tablet    Take 32 mg by mouth daily    Benign essential hypertension       carvedilol 25 MG tablet    COREG    60 tablet    Take 1 tablet (25 mg) by mouth 2 times daily (with meals)    Benign essential hypertension       letrozole 2.5 MG tablet    FEMARA     Take 2.5 mg by mouth At Bedtime        VITAMIN B12 PO      Take by mouth daily        VITAMIN D (CHOLECALCIFEROL) PO      Take 3,000 Units by mouth daily

## 2017-08-03 NOTE — MR AVS SNAPSHOT
After Visit Summary   8/3/2017    Theresa M Delosreyes    MRN: [id number]           Patient Information     Date Of Birth          1960        Visit Information        Provider Department      8/3/2017 1:00 PM BED 7  SLEEP [facility]        Today's Diagnoses     BRIT (obstructive sleep apnea)    -  1       Follow-ups after your visit        Your next 10 appointments already scheduled     Aug 03, 2017  4:00 PM CDT   Pre-Op physical with Dilan Soto MD   [facility] [place] [place]    [address] [facility] [address]   [phone]            Aug 04, 2017  9:00 AM CDT   HST Drop Off with  SLEEP CENTER DME   [facility])    [address] [id number] [phone]            Aug 16, 2017  1:30 PM CDT   Return Sleep Patient with Bennett Ezra Goltz, PA-C   [facility])    [address] [id number] [phone]              Who to contact     If you have questions or need follow up information about today's clinic visit or your schedule please contact [facility] directly at [phone].  Normal or non-critical lab and imaging results will be communicated to you by [employer], letter or phone within 4 business days after the clinic has received the results. If you do not hear from us within 7 days, please contact the clinic through [employer] or phone. If you have a critical or abnormal lab result, we will notify you by phone as soon as possible.  Submit refill requests through [employer] or call your pharmacy and they will forward the refill request to us. Please allow 3 business days for your refill to be completed.          Additional Information About Your Visit        [employer] Information     [employer] gives you secure access to your electronic health record. If you  see a primary care provider, you can also send messages to your care team and make appointments. If you have questions, please call your primary care clinic.  If you do not have a primary care provider, please call [phone] and they will assist you.        Care EveryWhere ID     This is your Care EveryWhere ID. This could be used by other organizations to access your [place] medical records  [id number]        Your Vitals Were     Last Period                   02/23/2013            Blood Pressure from Last 3 Encounters:   07/21/17 148/80   07/14/17 134/86   05/19/17 160/84    Weight from Last 3 Encounters:   07/21/17 90.2 kg (198 lb 12.8 oz)   07/14/17 90.3 kg (199 lb)   05/19/17 89.4 kg (197 lb)              Today, you had the following     No orders found for display       Primary Care Provider Office Phone # Fax #    Dilan Soto [phone]       [address] [address]        Equal Access to Services     [facility]: Hadii aad ku hadasho Soomaali, waaxda luqadaha, qaybta kaalmada adeegyada, waxay clint haycornel cuevas . So [facility] [phone].    ATENCIÓN: Si habla español, tiene a gil disposición servicios gratuitos de asistencia lingüística. Llame al [phone].    We comply with applicable federal civil rights laws and [place] laws. We do not discriminate on the basis of race, color, national origin, age, disability sex, sexual orientation or gender identity.            Thank you!     Thank you for choosing [place] [facility]  for your care. Our goal is always to provide you with excellent care. Hearing back from our patients is one way we can continue to improve our services. Please take a few minutes to complete the written survey that you may receive in the mail after your visit with us. Thank you!             Your Updated Medication List - Protect others around you: Learn how to safely use, store and throw away  your medicines at www.disposemymeds.org.          This list is accurate as of: 8/3/17  1:04 PM.  Always use your most recent med list.                   Brand Name Dispense Instructions for use Diagnosis    ACETAMINOPHEN PO      Take 1,500 mg by mouth daily as needed for pain        candesartan cilexetil 32 MG Tabs     90 tablet    Take 32 mg by mouth daily    Benign essential hypertension       carvedilol 25 MG tablet    COREG    60 tablet    Take 1 tablet (25 mg) by mouth 2 times daily (with meals)    Benign essential hypertension       letrozole 2.5 MG tablet    FEMARA     Take 2.5 mg by mouth At Bedtime        VITAMIN B12 PO      Take by mouth daily        VITAMIN D (CHOLECALCIFEROL) PO      Take 3,000 Units by mouth daily

## 2017-08-04 ENCOUNTER — DOCUMENTATION ONLY (OUTPATIENT)
Dept: SLEEP MEDICINE | Facility: CLINIC | Age: 57
End: 2017-08-04

## 2017-08-04 NOTE — PROCEDURES
"HOME SLEEP STUDY INTERPRETATION    Patient: Theresa M Delosreyes  MRN: [id number]  YOB: 1960  Study Date: 8/3/2017  Referring Provider: Dilan Soto;   Ordering Provider: Bennett Goltz, PA     Indications for Home Study: Theresa M Delosreyes is a 56 year old female with a history of hypertension, hyperlipidemia, previous diagnosis of BRIT who presents with symptoms suggestive of obstructive sleep apnea.    Estimated body mass index is 35.25 kg/(m^2) as calculated from the following:    Height as of an earlier encounter on 8/3/17: 1.6 m (5' 3\").    Weight as of an earlier encounter on 8/3/17: 90.3 kg (199 lb).  Total score - [place]: 9 (2017 10:00 AM)  STOP-BAN/8    Data: A full night home sleep study was performed recording the standard physiologic parameters including body position, movement, sound, nasal pressure, thermal oral airflow, chest and abdominal movements with respiratory inductance plethysmography, and oxygen saturation by pulse oximetry. Pulse rate was estimated by oximetry recording. This study was considered adequate based on > 4 hours of quality oximetry and respiratory recording. As specified by the AASM Manual for the Scoring of Sleep and Associated events, version 2.3, Rule VIII.D 1B, 4% oxygen desaturation scoring for hypopneas is used as a standard of care on all home sleep apnea testing.    Analysis Time:  502 minutes    Respiration:   Sleep Associated Hypoxemia: sustained hypoxemia was not present. Baseline oxygen saturation was 93.6%.  Time with saturation less than or equal to 88% was 0.5 minutes. The lowest oxygen saturation was 87%.   Snoring: Snoring was present.  Respiratory events: The home study revealed a presence of 6 obstructive apneas and 0 mixed and central apneas. There were 52 hypopneas resulting in a combined apnea/hypopnea index [AHI] of 6.9 events per hour.  AHI was 6.9 per hour supine, - per hour prone, - per hour on left side, and - per hour on right " side.   Pattern: Excluding events noted above, respiratory rate and pattern was Normal.    Position: Percent of time spent: supine - 99.7%, prone - 0%, on left - 0%, on right - 0%.    Heart Rate: By pulse oximetry normal rate was noted.     Assessment:   Mild obstructive sleep apnea.  Sleep associated hypoxemia was not present.    Recommendations:  Consider oral appliance therapy. Auto CPAP is a consideration if clinically indicated.   Suggest optimizing sleep hygiene and avoiding sleep deprivation.  Weight management.    Diagnosis Code(s): Obstructive Sleep Apnea G47.33    Ephraim Inman MD, MD, August 4, 2017   Diplomate, American Board of Psychiatry and Neurology, Sleep Medicine

## 2017-08-04 NOTE — NURSING NOTE
HST drop off  Download successful. Routed for scoring.    Chasity ShepardAbraham  Sleep Clinic - Specialist

## 2017-08-16 ENCOUNTER — OFFICE VISIT (OUTPATIENT)
Dept: SLEEP MEDICINE | Facility: CLINIC | Age: 57
End: 2017-08-16
Payer: COMMERCIAL

## 2017-08-16 VITALS
WEIGHT: 200 LBS | TEMPERATURE: 98.2 F | HEIGHT: 63 IN | OXYGEN SATURATION: 94 % | BODY MASS INDEX: 35.44 KG/M2 | HEART RATE: 89 BPM

## 2017-08-16 DIAGNOSIS — G47.33 OSA (OBSTRUCTIVE SLEEP APNEA): Primary | ICD-10-CM

## 2017-08-16 PROCEDURE — 99213 OFFICE O/P EST LOW 20 MIN: CPT | Performed by: PHYSICIAN ASSISTANT

## 2017-08-16 RX ORDER — OXYCODONE AND ACETAMINOPHEN 5; 325 MG/1; MG/1
TABLET ORAL
Refills: 0 | COMMUNITY
Start: 2017-08-10 | End: 2017-11-10

## 2017-08-16 NOTE — PROGRESS NOTES
Sleep Study Follow-Up Visit:    Date on this visit: 8/16/2017    Theresa M Delosreyes comes in today for follow-up of her sleep study done on 8/3/2017 at the [facility] for at the request of Dr. Soto for re-evaluation of previously diagnosed BRIT. Her medical history is significant for HTN, breast cancer, high cholesterol and obesity.     Her previous sleep study occurred about 20 years ago in [place].  She used CPAP for about 1 year. She did not like it. The mask caused her skin to break out. She used a full face mask. She had her tonsils removed and that helped until she gained weight gradually about 5-7 years ago.  Home Sleep Study Test Results  Bed Time Starts: 8:51 PM.  Bed Time Ends: 5:15 AM.  Total estimated sleep time 502.6 minutes.    AHI: 6.9/hr JONG: 7.5/hr Supine AHI: 6.9/hr Lateral AHI: N/A/hr   Average SpO2: 93.6% Lowest Desaturation: 87%  Time Spent Below 89%: 0.5 minutes  Total Obstructive Apneas 6  Total Central/Mixed Apneas 0  Hypopneas 52    Description of Snoring: moderate    Average Pulse: 64 bpm  Highest Pulse: 85 bpm  Lowest Pulse: 52 bpm    Percent of time spent supine: 99.7%    She woke in the middle of the night with leg cramps in both legs. She slept with the head of the bed elevated almost to sitting upright. She says she is more comfortable in that position.     Past medical/surgical history, family history, social history, medications and allergies were reviewed.      Problem List:  Patient Active Problem List    Diagnosis Date Noted     Hypertension goal BP (blood pressure) < 140/90 07/13/2013     Priority: High     Hyperlipidemia LDL goal <130 03/05/2013     Priority: High     Osteoarthritis of both knees      Priority: High     Obesity (BMI 30-39.9)      Priority: High     BRIT (obstructive sleep apnea)      Priority: High     not using CPAP, treated with tonsilliectomy       Female stress incontinence 12/18/2006     Priority: High     Benign essential  hypertension 05/19/2017     Priority: Medium     Drugs and medicinal substances causing adverse effect in therapeutic use, initial encounter 05/19/2017     Priority: Medium     Breast cancer (H) 04/13/2016     Priority: Medium     Infiltrating ductal carcinoma of right female breast (H) 10/20/2015     Priority: Medium     Antineoplastic antibiotics causing adverse effect in therapeutic use 10/20/2015     Priority: Medium     Chest pain 05/01/2015     Priority: Medium     Status post laparoscopic cholecystectomy 01/01/2005     Priority: Low     Cholelithiasis with mild subacute hemorrhagic cholecystitis          Impression/Plan:    (G47.33) BRIT (obstructive sleep apnea)  (primary encounter diagnosis)  Comment: This study showed borderline BRIT. Her AHI was about 7/hr. She did not have significant hypoxemia.   Plan: We discussed that this degree of apnea is borderline normal. It is unlikely to contribute significantly to her labile blood pressure. We discussed that her apnea is likely worse if she sleeps at less of an incline. She does not plan to. We discussed that she should continue to work on weight loss to help eliminate her mild apnea. She was advised to contact me if she decides she would like to treat with CPAP or dental appliance in the future.       She will follow up with me in the future as needed.     Fifteen minutes spent with patient, all of which were spent face-to-face counseling, consulting, coordinating plan of care.      Bennett Goltz, PA-C    CC: Dilan Soto MD

## 2017-08-16 NOTE — NURSING NOTE
"Chief Complaint   Patient presents with     Study Results     HST results       Initial Pulse 89  Temp 98.2  F (36.8  C) (Oral)  Ht 1.6 m (5' 2.99\")  Wt 90.7 kg (200 lb)  LMP 02/23/2013  SpO2 94%  BMI 35.44 kg/m2 Estimated body mass index is 35.44 kg/(m^2) as calculated from the following:    Height as of this encounter: 1.6 m (5' 2.99\").    Weight as of this encounter: 90.7 kg (200 lb).  Medication Reconciliation: complete  Myriam Genao MA      "

## 2017-08-16 NOTE — MR AVS SNAPSHOT
"              After Visit Summary   8/16/2017    Theresa M Delosreyes    MRN: [id number]           Patient Information     Date Of Birth          1960        Visit Information        Provider Department      8/16/2017 1:30 PM Goltz, Bennett Ezra, PA-C [facility]        Today's Diagnoses     BRIT (obstructive sleep apnea)    -  1       Follow-ups after your visit        Follow-up notes from your care team     Return if symptoms worsen or fail to improve.      Who to contact     If you have questions or need follow up information about today's clinic visit or your schedule please contact [place] [facility] directly at [phone].  Normal or non-critical lab and imaging results will be communicated to you by MyChart, letter or phone within 4 business days after the clinic has received the results. If you do not hear from us within 7 days, please contact the clinic through [employer] or phone. If you have a critical or abnormal lab result, we will notify you by phone as soon as possible.  Submit refill requests through [employer] or call your pharmacy and they will forward the refill request to us. Please allow 3 business days for your refill to be completed.          Additional Information About Your Visit        MyChart Information     [employer] gives you secure access to your electronic health record. If you see a primary care provider, you can also send messages to your care team and make appointments. If you have questions, please call your primary care clinic.  If you do not have a primary care provider, please call [phone] and they will assist you.        Care EveryWhere ID     This is your Care EveryWhere ID. This could be used by other organizations to access your [facility] medical records  [id number]        Your Vitals Were     Pulse Temperature Height Last Period Pulse Oximetry BMI (Body Mass Index)    89 98.2  F (36.8  C) (Oral) 1.6 m (5' 2.99\") 02/23/2013 94% 35.44 kg/m2    "    Blood Pressure from Last 3 Encounters:   08/03/17 131/82   07/21/17 148/80   07/14/17 134/86    Weight from Last 3 Encounters:   08/16/17 90.7 kg (200 lb)   08/03/17 90.3 kg (199 lb)   07/21/17 90.2 kg (198 lb 12.8 oz)              Today, you had the following     No orders found for display       Primary Care Provider Office Phone # Fax #    Dilan NIKKI Stoo [phone]        [facility] [address]        Equal Access to Services     [facility]: Hadii becka nicholas hadronda Sofidelina, waaxterra garnica, aramis kaalmaterra hidalgo, chastity cuevas . So [facility] [phone].    ATENCIÓN: Si habla español, tiene a gil disposición servicios gratuitos de asistencia lingüística. Ll[facility] [phone].    We comply with applicable federal civil rights laws and [place] laws. We do not discriminate on the basis of race, color, national origin, age, disability sex, sexual orientation or gender identity.            Thank you!     Thank you for choosing [place] [facility]  for your care. Our goal is always to provide you with excellent care. Hearing back from our patients is one way we can continue to improve our services. Please take a few minutes to complete the written survey that you may receive in the mail after your visit with us. Thank you!             Your Updated Medication List - Protect others around you: Learn how to safely use, store and throw away your medicines at www.disposemymeds.org.          This list is accurate as of: 8/16/17  3:24 PM.  Always use your most recent med list.                   Brand Name Dispense Instructions for use Diagnosis    ACETAMINOPHEN PO      Take 1,500 mg by mouth daily as needed for pain        candesartan cilexetil 32 MG Tabs     90 tablet    Take 32 mg by mouth daily    Benign essential hypertension       carvedilol 25 MG tablet    COREG    60 tablet    Take 1 tablet (25 mg) by mouth 2 times daily (with meals)     Benign essential hypertension       letrozole 2.5 MG tablet    FEMARA     Take 2.5 mg by mouth At Bedtime        oxyCODONE-acetaminophen 5-325 MG per tablet    PERCOCET          VITAMIN B12 PO      Take by mouth daily        VITAMIN D (CHOLECALCIFEROL) PO      Take 3,000 Units by mouth daily

## 2017-09-29 ENCOUNTER — TRANSFERRED RECORDS (OUTPATIENT)
Dept: HEALTH INFORMATION MANAGEMENT | Facility: CLINIC | Age: 57
End: 2017-09-29

## 2017-10-13 ENCOUNTER — TRANSFERRED RECORDS (OUTPATIENT)
Dept: HEALTH INFORMATION MANAGEMENT | Facility: CLINIC | Age: 57
End: 2017-10-13

## 2017-10-16 ENCOUNTER — TRANSFERRED RECORDS (OUTPATIENT)
Dept: HEALTH INFORMATION MANAGEMENT | Facility: CLINIC | Age: 57
End: 2017-10-16

## 2017-11-10 ENCOUNTER — OFFICE VISIT (OUTPATIENT)
Dept: FAMILY MEDICINE | Facility: CLINIC | Age: 57
End: 2017-11-10
Payer: COMMERCIAL

## 2017-11-10 VITALS
TEMPERATURE: 98.2 F | DIASTOLIC BLOOD PRESSURE: 70 MMHG | OXYGEN SATURATION: 98 % | HEART RATE: 76 BPM | WEIGHT: 201 LBS | HEIGHT: 63 IN | BODY MASS INDEX: 35.61 KG/M2 | SYSTOLIC BLOOD PRESSURE: 126 MMHG | RESPIRATION RATE: 16 BRPM

## 2017-11-10 DIAGNOSIS — Z01.818 PREOP GENERAL PHYSICAL EXAM: Primary | ICD-10-CM

## 2017-11-10 DIAGNOSIS — Z23 NEED FOR PROPHYLACTIC VACCINATION AND INOCULATION AGAINST INFLUENZA: ICD-10-CM

## 2017-11-10 LAB
ERYTHROCYTE [DISTWIDTH] IN BLOOD BY AUTOMATED COUNT: 13.6 % (ref 10–15)
HCT VFR BLD AUTO: 40.1 % (ref 35–47)
HGB BLD-MCNC: 13.3 G/DL (ref 11.7–15.7)
MCH RBC QN AUTO: 29 PG (ref 26.5–33)
MCHC RBC AUTO-ENTMCNC: 33.2 G/DL (ref 31.5–36.5)
MCV RBC AUTO: 88 FL (ref 78–100)
PLATELET # BLD AUTO: 340 10E9/L (ref 150–450)
RBC # BLD AUTO: 4.58 10E12/L (ref 3.8–5.2)
WBC # BLD AUTO: 6.5 10E9/L (ref 4–11)

## 2017-11-10 PROCEDURE — 99214 OFFICE O/P EST MOD 30 MIN: CPT | Mod: 25 | Performed by: FAMILY MEDICINE

## 2017-11-10 PROCEDURE — 36415 COLL VENOUS BLD VENIPUNCTURE: CPT | Performed by: FAMILY MEDICINE

## 2017-11-10 PROCEDURE — 85027 COMPLETE CBC AUTOMATED: CPT | Performed by: FAMILY MEDICINE

## 2017-11-10 PROCEDURE — 90471 IMMUNIZATION ADMIN: CPT | Performed by: FAMILY MEDICINE

## 2017-11-10 PROCEDURE — 90686 IIV4 VACC NO PRSV 0.5 ML IM: CPT | Performed by: FAMILY MEDICINE

## 2017-11-10 NOTE — PROGRESS NOTES
"Chief Complaint   Patient presents with     Pre-Op Exam       Initial /70  Pulse 76  Temp 98.2  F (36.8  C)  Resp 16  Ht 5' 3\" (1.6 m)  Wt 201 lb (91.2 kg)  LMP 2013  SpO2 98%  BMI 35.61 kg/m2 Estimated body mass index is 35.61 kg/(m^2) as calculated from the following:    Height as of this encounter: 5' 3\" (1.6 m).    Weight as of this encounter: 201 lb (91.2 kg).  Medication Reconciliation: complete. JAZMÍN Bennett LPN      [address] [address] [phone]  Dept: [phone]    PRE-OP EVALUATION:  Today's date: 11/10/2017    Theresa M Delosreyes (: 1960) presents for pre-operative evaluation assessment as requested by Dr. Urbina.  She requires evaluation and anesthesia risk assessment prior to undergoing surgery/procedure for treatment of Rt foot .  Proposed procedure: bunionectomy    Date of Surgery/ Procedure: 17  Time of Surgery/ Procedure: 4:00  Hospital/Surgical Facility: [facility]  [phone]  Primary Physician: Dilan Soto  Type of Anesthesia Anticipated: to be determined    Patient has a Health Care Directive or Living Will:  YES     1. NO - Do you have a history of heart attack, stroke, stent, bypass or surgery on an artery in the head, neck, heart or legs?  2. NO - Do you ever have any pain or discomfort in your chest?  3. NO - Do you have a history of  Heart Failure?  4. NO - Are you troubled by shortness of breath when: walking on the level, up a slight hill or at night?  5. NO - Do you currently have a cold, bronchitis or other respiratory infection?  6. NO - Do you have a cough, shortness of breath or wheezing?  7. NO - Do you sometimes get pains in the calves of your legs when you walk?  8. NO - Do you or anyone in your family have previous history of blood clots?  9. NO - Do you or does anyone in your family have a serious bleeding problem such as prolonged bleeding following " surgeries or cuts?  10. NO - Have you ever had problems with anemia or been told to take iron pills?  11. NO - Have you had any abnormal blood loss such as black, tarry or bloody stools, or abnormal vaginal bleeding?  12. NO - Have you ever had a blood transfusion?  13. YES - Have you or any of your relatives ever had problems with anesthesia? Patients mother and herself  14. YES- Do you have sleep apnea, excessive snoring or daytime drowsiness? Mild sleep apnea  15. NO - Do you have any prosthetic heart valves?  16. NO - Do you have prosthetic joints?  17. NO - Is there any chance that you may be pregnant?    JAZMÍN Bennett LPN      HPI:                                                      See problem list for active medical problems.  Problems all longstanding and stable, except as noted/documented.  See ROS for pertinent symptoms related to these conditions.                                                                                                  .    MEDICAL HISTORY:                                                    Patient Active Problem List    Diagnosis Date Noted     Hypertension goal BP (blood pressure) < 140/90 07/13/2013     Priority: High     Hyperlipidemia LDL goal <130 03/05/2013     Priority: High     Osteoarthritis of both knees      Priority: High     Obesity (BMI 30-39.9)      Priority: High     BRIT (obstructive sleep apnea)      Priority: High     not using CPAP, treated with tonsilliectomy       Female stress incontinence 12/18/2006     Priority: High     Benign essential hypertension 05/19/2017     Priority: Medium     Drugs and medicinal substances causing adverse effect in therapeutic use, initial encounter 05/19/2017     Priority: Medium     Breast cancer (H) 04/13/2016     Priority: Medium     Infiltrating ductal carcinoma of right female breast (H) 10/20/2015     Priority: Medium     Antineoplastic antibiotics causing adverse effect in therapeutic use 10/20/2015     Priority: Medium      "Chest pain 2015     Priority: Medium     Status post laparoscopic cholecystectomy 2005     Priority: Low     Cholelithiasis with mild subacute hemorrhagic cholecystitis        Past Medical History:   Diagnosis Date     Benign hypertension     started medication 3/13     Cancer (H)     breast cancer     Chest pain     side effect of Femera     Female stress incontinence 2006    s/p sling procedure     Mixed hyperlipidemia      Motion sickness      Obesity (BMI 30-39.9)      BRIT (obstructive sleep apnea)     not using CPAP, treated with tonsilliectomy     Osteoarthritis of both knees      PONV (postoperative nausea and vomiting)      Status post laparoscopic cholecystectomy 2005    Cholelithiasis with mild subacute hemorrhagic cholecystitis     Supervision of other normal pregnancy      - vaginal     Uncomplicated asthma     with respiratory infections     Past Surgical History:   Procedure Laterality Date     COLONOSCOPY  2006    ormal to lt transverse colon, incomplete due to \"extremely redundant colon\"\"     D & C  2004     EXTRACT LENS CLEAR, EXCHANGE LENS REFRACTIVE, COMBINED  3/2013    bilateral     GRAFT FAT TO BREAST Bilateral 2016    Procedure: GRAFT FAT TO BREAST;  Surgeon: Alhaji Patton MD;  [facility]     GRAFT FREE VASCULARIZED TRANSVERSE RECTUS ABDOMINIS MYOCUTANEOUS Bilateral 2016    Procedure: GRAFT FREE VASCULARIZED TRANSVERSE RECTUS ABDOMINIS MYOCUTANEOUS;  Surgeon: Alhaji Patton MD;  Location: [facility]     HC ABLATION, ENDOMETRIAL, THERMAL, W/O HYSTEROSCOPIC GUIDANCE  2004    NovaSure endometrial ablation.      HC COLON AIR CONTRAST  2006    normal     HC SLING OPERATION FOR STRESS INCONTINENCE  2007    [facility]arc sling procedure     INSERT PORT VASCULAR ACCESS Left 2015    Procedure: INSERT PORT VASCULAR ACCESS;  Surgeon: August Rdz MD;  [facility]     INSERT TISSUE EXPANDER BREAST BILATERAL " Bilateral 9/29/2015    Procedure: INSERT TISSUE EXPANDER BREAST BILATERAL;  Surgeon: Alhaji Patton MD;  Location:  OR     LAPAROSCOPIC CHOLECYSTECTOMY WITH CHOLANGIOGRAMS  1/2005    Laparoscopic cholecystectomy with intraoperative  cholangiogram     MASTECTOMY SIMPLE BILATERAL, SENTINEL NODE BILATERAL, COMBINED Bilateral 9/29/2015    Procedure: COMBINED MASTECTOMY SIMPLE BILATERAL, SENTINEL NODE BILATERAL;  Surgeon: August Rdz MD;  Location:  OR     RECONSTRUCT BREAST BILATERAL Bilateral 11/18/2016    Procedure: RECONSTRUCT BREAST BILATERAL;  Surgeon: Alhaji Patton MD;  [facility]     REMOVE PORT VASCULAR ACCESS N/A 11/18/2016    Procedure: REMOVE PORT VASCULAR ACCESS;  Surgeon: Alhaji Patton MD;  [facility]     REMOVE TISSUE EXPANDER BREAST Bilateral 4/12/2016    Procedure: REMOVE TISSUE EXPANDER BREAST;  Surgeon: Alhaji Patton MD;  Location:  OR     REVISE SCAR TRUNK N/A 11/18/2016    Procedure: REVISE SCAR TRUNK;  Surgeon: Alhaji Patton MD;  [facility]:  [facility]     TONSILLECTOMY  2001     VASCULAR SURGERY      insert port and removed     Current Outpatient Prescriptions   Medication Sig Dispense Refill     Cyanocobalamin (VITAMIN B12 PO) Take by mouth daily       carvedilol (COREG) 25 MG tablet Take 1 tablet (25 mg) by mouth 2 times daily (with meals) 60 tablet 11     candesartan 32 MG TABS Take 32 mg by mouth daily 90 tablet 3     VITAMIN D, CHOLECALCIFEROL, PO Take 3,000 Units by mouth daily       letrozole (FEMARA) 2.5 MG tablet Take 2.5 mg by mouth At Bedtime   0     OTC products: None, except as noted above    Allergies   Allergen Reactions     Definity Other (See Comments)     A substance that helps with imaging the heart muscle with echocardiology    Full body ache starting in the low back.      Keflex [Cephalexin]      Hand swelling, blisters???      Latex Allergy: NO    Social History   Substance Use Topics     Smoking  "status: Never Smoker     Smokeless tobacco: Never Used     Alcohol use No     History   Drug Use No       REVIEW OF SYSTEMS:                                                    C: NEGATIVE for fever, chills, change in weight  E/M: NEGATIVE for ear, mouth and throat problems  R: NEGATIVE for significant cough or SOB  CV: NEGATIVE for chest pain, palpitations or peripheral edema    EXAM:                                                    /70  Pulse 76  Temp 98.2  F (36.8  C)  Resp 16  Ht 5' 3\" (1.6 m)  Wt 201 lb (91.2 kg)  LMP 02/23/2013  SpO2 98%  BMI 35.61 kg/m2  GENERAL APPEARANCE: healthy, alert and no distress  HENT: ear canals and TM's normal and nose and mouth without ulcers or lesions  RESP: lungs clear to auscultation - no rales, rhonchi or wheezes  CV: regular rate and rhythm, normal S1 S2, no S3 or S4 and no murmur, click or rub   ABDOMEN: soft, nontender, no HSM or masses and bowel sounds normal  NEURO: Normal strength and tone, sensory exam grossly normal, mentation intact and speech normal    DIAGNOSTICS:                                                      Labs Resulted Today:   Results for orders placed or performed in visit on 11/10/17   CBC with platelets   Result Value Ref Range    WBC 6.5 4.0 - 11.0 10e9/L    RBC Count 4.58 3.8 - 5.2 10e12/L    Hemoglobin 13.3 11.7 - 15.7 g/dL    Hematocrit 40.1 35.0 - 47.0 %    MCV 88 78 - 100 fl    MCH 29.0 26.5 - 33.0 pg    MCHC 33.2 31.5 - 36.5 g/dL    RDW 13.6 10.0 - 15.0 %    Platelet Count 340 150 - 450 10e9/L       Recent Labs   Lab Test  07/03/17   0824  02/15/17   1552   11/16/16   1143  04/15/16   0800  04/12/16   1955   04/08/16   1010  12/07/15   1026   HGB   --    --    --   13.8   --    --    --   12.8  12.7   PLT   --    --    --    --   233  266   --   327  394   INR   --    --    --    --    --    --    --    --   0.93   NA  142  140   < >  136   --    --    --   140   --    POTASSIUM  4.0  4.3   < >  3.8   --    --    < >  4.0   --  "   CR  0.62  0.80   < >  0.72   --   0.66   --   0.68   --     < > = values in this interval not displayed.   Echocardiogram: (4-3-17)     Interpretation Summary     The left ventricle is normal in size.  There is normal left ventricular wall thickness.  The visual ejection fraction is estimated at 55-60%.  The transmitral spectral Doppler flow pattern is suggestive of diastolic  dysfunction of the left ventricle.  No regional wall motion abnormalities noted.  The right ventricle is normal in structure, function and size.  The mitral valve leaflets are mildly thickened.  There is trace mitral regurgitation.  There is trace tricuspid regurgitation.  There is trivial trileaflet aortic sclerosis.  The rhythm was normal sinus.  The study was technically difficult. Limited views were obtained. Compared to  prior study, there is no significant change. No contrast used due to reaction  to Definity.  _____________________________________________________________________________  __        Left Ventricle  The left ventricle is normal in size. There is normal left ventricular wall  thickness. The visual ejection fraction is estimated at 55-60%. Left  ventricular systolic function is normal. The transmitral spectral Doppler flow  pattern is suggestive of diastolic dysfunction of the left ventricle. No  regional wall motion abnormalities noted. There is no thrombus seen in the  left ventricle.     Right Ventricle  The right ventricle is normal in structure, function and size. There is normal  right ventricular wall thickness. The right ventricle is not well visualized.     Atria  Normal left atrial size. Right atrial size is normal. Intact atrial septum.     Mitral Valve  The mitral valve leaflets are mildly thickened. There is trace mitral  regurgitation.     Tricuspid Valve  The tricuspid valve is not well visualized, but is grossly normal. There is  trace tricuspid regurgitation. Right ventricular systolic pressure could  not  be approximated due to inadequate tricuspid regurgitation.        Aortic Valve  There is trivial trileaflet aortic sclerosis. No aortic regurgitation is  present.     Pulmonic Valve  The pulmonic valve is not well visualized. There is no pulmonic valvular  regurgitation.     Vessels  The aortic root is normal size. Normal size ascending aorta. The inferior vena  cava is not dilated. Pulmonary arteries not well visualized.     Pericardium  The pericardium appears normal. There is no pericardial effusion.     Rhythm  The rhythm was normal sinus.     _____________________________________________________________________________  __  MMode/2D Measurements & Calculations  IVSd: 1.2 cm  LVIDd: 4.4 cm  LVIDs: 2.0 cm  LVPWd: 0.80 cm  FS: 54.7 %  EDV(Teich): 87.4 ml  ESV(Teich): 12.6 ml  LV mass(C)d: 144.6 grams  Ao root diam: 2.9 cm  LA dimension: 3.8 cm     LA/Ao: 1.3        Doppler Measurements & Calculations  MV E max tori: 82.5 cm/sec  MV A max tori: 88.8 cm/sec  MV E/A: 0.93  LV IVRT: 0.08 sec  MV dec time: 0.17 sec  Lateral E/e': 8.9  Medial E/e': 11.7  IMPRESSION:                                                    Reason for surgery/procedure: Bunionectomy     The proposed surgical procedure is considered LOW risk.    REVISED CARDIAC RISK INDEX  The patient has the following serious cardiovascular risks for perioperative complications such as (MI, PE, VFib and 3  AV Block):  No serious cardiac risks  INTERPRETATION: 0 risks: Class I (very low risk - 0.4% complication rate)    The patient has the following additional risks for perioperative complications:  No identified additional risks  The 10-year ASCVD risk score (Tobibiju PLUMMER Jr, et al., 2013) is: 2.5%    Values used to calculate the score:      Age: 57 years      Sex: Female      Is Non- : No      Diabetic: No      Tobacco smoker: No      Systolic Blood Pressure: 126 mmHg      Is BP treated: Yes      HDL Cholesterol: 65 mg/dL      Total  Cholesterol: 204 mg/dL      ICD-10-CM    1. Preop general physical exam Z01.818 CBC with platelets       RECOMMENDATIONS:                                                      --Patient is to take all scheduled medications on the day of surgery EXCEPT for modifications listed below.    ACE Inhibitor or Angiotensin Receptor Blocker (ARB) Use  Ace inhibitor or Angiotensin Receptor Blocker (ARB) and will continue this medication due to the higher risk of uncontrolled perioerative hypertension (e.g. neurosurgical procedure)        APPROVAL GIVEN to proceed with proposed procedure, without further diagnostic evaluation       Signed Electronically by: Dilan Soto MD    Copy of this evaluation report is provided to requesting physician.    [facility] Preop Guidelines

## 2017-11-10 NOTE — MR AVS SNAPSHOT
After Visit Summary   11/10/2017    Theresa M Delosreyes    MRN: [id number]           Patient Information     Date Of Birth          1960        Visit Information        Provider Department      11/10/2017 10:40 AM Dil[facility] [place]        Today's Diagnoses     Preop general physical exam    -  1    Need for prophylactic vaccination and inoculation against influenza          Care Instructions      Before Your Surgery      Call your surgeon if there is any change in your health. This includes signs of a cold or flu (such as a sore throat, runny nose, cough, rash or fever).    Do not smoke, drink alcohol or take over the counter medicine (unless your surgeon or primary care doctor tells you to) for the 24 hours before and after surgery.    If you take prescribed drugs: Follow your doctor s orders about which medicines to take and which to stop until after surgery.    Eating and drinking prior to surgery: follow the instructions from your surgeon    Take a shower or bath the night before surgery. Use the soap your surgeon gave you to gently clean your skin. If you do not have soap from your surgeon, use your regular soap. Do not shave or scrub the surgery site.  Wear clean pajamas and have clean sheets on your bed.           Follow-ups after your visit        Who to contact     If you have questions or need follow up information about today's clinic visit or your schedule please contact [facility] directly at [phone].  Normal or non-critical lab and imaging results will be communicated to you by MyChart, letter or phone within 4 business days after the clinic has received the results. If you do not hear from us within 7 days, please contact the clinic through MyChart or phone. If you have a critical or abnormal lab result, we will notify you by phone as soon as possible.  Submit refill requests through [employer] or call your pharmacy and they will  "forward the refill request to us. Please allow 3 business days for your refill to be completed.          Additional Information About Your Visit        [employer] Information     [employer] gives you secure access to your electronic health record. If you see a primary care provider, you can also send messages to your care team and make appointments. If you have questions, please call your primary care clinic.  If you do not have a primary care provider, please call [phone] and they will assist you.        Care EveryWhere ID     This is your Care EveryWhere ID. This could be used by other organizations to access your [place] medical records  [id number]        Your Vitals Were     Pulse Temperature Respirations Height Last Period Pulse Oximetry    76 98.2  F (36.8  C) 16 5' 3\" (1.6 m) 02/23/2013 98%    BMI (Body Mass Index)                   35.61 kg/m2            Blood Pressure from Last 3 Encounters:   11/10/17 126/70   08/03/17 131/82   07/21/17 148/80    Weight from Last 3 Encounters:   11/10/17 201 lb (91.2 kg)   08/16/17 200 lb (90.7 kg)   08/03/17 199 lb (90.3 kg)              We Performed the Following     CBC with platelets     FLU VAC, SPLIT VIRUS IM > 3 YO (QUADRIVALENT) [92487]     Vaccine Administration, Initial [68114]        Primary Care Provider Office Phone # Fax #    Dilan NIKKI Soto [phone]       [address] [place] [address]        Equal Access to Services     [facility]: Hadii aad ku hadasho Soomaali, waaxda luqadaha, qaybta kaalmada adeegyada, waxay clint cuevas . So [facility] [phone].    ATENCIÓN: Si habla español, tiene a gil disposición servicios gratuitos de asistencia lingüística. Llame al [phone].    We comply with applicable federal civil rights laws and [place] laws. We do not discriminate on the basis of race, color, national origin, age, disability, sex, sexual orientation, or gender identity.            Thank you!     " Thank you for choosing [facility]  for your care. Our goal is always to provide you with excellent care. Hearing back from our patients is one way we can continue to improve our services. Please take a few minutes to complete the written survey that you may receive in the mail after your visit with us. Thank you!             Your Updated Medication List - Protect others around you: Learn how to safely use, store and throw away your medicines at www.disposemymeds.org.          This list is accurate as of: 11/10/17 11:25 AM.  Always use your most recent med list.                   Brand Name Dispense Instructions for use Diagnosis    candesartan cilexetil 32 MG Tabs     90 tablet    Take 32 mg by mouth daily    Benign essential hypertension       carvedilol 25 MG tablet    COREG    60 tablet    Take 1 tablet (25 mg) by mouth 2 times daily (with meals)    Benign essential hypertension       letrozole 2.5 MG tablet    FEMARA     Take 2.5 mg by mouth At Bedtime        VITAMIN B12 PO      Take by mouth daily        VITAMIN D (CHOLECALCIFEROL) PO      Take 3,000 Units by mouth daily

## 2017-11-10 NOTE — PROGRESS NOTES

## 2017-11-28 ENCOUNTER — TRANSFERRED RECORDS (OUTPATIENT)
Dept: HEALTH INFORMATION MANAGEMENT | Facility: CLINIC | Age: 57
End: 2017-11-28

## 2017-11-29 ENCOUNTER — OFFICE VISIT (OUTPATIENT)
Dept: FAMILY MEDICINE | Facility: CLINIC | Age: 57
End: 2017-11-29
Payer: COMMERCIAL

## 2017-11-29 VITALS
HEART RATE: 78 BPM | DIASTOLIC BLOOD PRESSURE: 82 MMHG | OXYGEN SATURATION: 99 % | HEIGHT: 63 IN | SYSTOLIC BLOOD PRESSURE: 142 MMHG | RESPIRATION RATE: 16 BRPM | TEMPERATURE: 99.1 F

## 2017-11-29 DIAGNOSIS — N30.00 ACUTE CYSTITIS WITHOUT HEMATURIA: Primary | ICD-10-CM

## 2017-11-29 DIAGNOSIS — T75.3XXA MOTION SICKNESS, INITIAL ENCOUNTER: ICD-10-CM

## 2017-11-29 DIAGNOSIS — Z12.31 VISIT FOR SCREENING MAMMOGRAM: ICD-10-CM

## 2017-11-29 DIAGNOSIS — R30.0 DYSURIA: ICD-10-CM

## 2017-11-29 LAB
ALBUMIN UR-MCNC: NEGATIVE MG/DL
APPEARANCE UR: CLEAR
BACTERIA #/AREA URNS HPF: ABNORMAL /HPF
BILIRUB UR QL STRIP: NEGATIVE
COLOR UR AUTO: YELLOW
GLUCOSE UR STRIP-MCNC: NEGATIVE MG/DL
HGB UR QL STRIP: ABNORMAL
KETONES UR STRIP-MCNC: NEGATIVE MG/DL
LEUKOCYTE ESTERASE UR QL STRIP: ABNORMAL
NITRATE UR QL: POSITIVE
NON-SQ EPI CELLS #/AREA URNS LPF: ABNORMAL /LPF
PH UR STRIP: 6.5 PH (ref 5–7)
RBC #/AREA URNS AUTO: ABNORMAL /HPF
SOURCE: ABNORMAL
SP GR UR STRIP: 1.01 (ref 1–1.03)
UROBILINOGEN UR STRIP-ACNC: 0.2 EU/DL (ref 0.2–1)
WBC #/AREA URNS AUTO: ABNORMAL /HPF

## 2017-11-29 PROCEDURE — 87186 SC STD MICRODIL/AGAR DIL: CPT | Performed by: FAMILY MEDICINE

## 2017-11-29 PROCEDURE — 87086 URINE CULTURE/COLONY COUNT: CPT | Performed by: FAMILY MEDICINE

## 2017-11-29 PROCEDURE — 81001 URINALYSIS AUTO W/SCOPE: CPT | Performed by: FAMILY MEDICINE

## 2017-11-29 PROCEDURE — 99214 OFFICE O/P EST MOD 30 MIN: CPT | Performed by: FAMILY MEDICINE

## 2017-11-29 PROCEDURE — 87088 URINE BACTERIA CULTURE: CPT | Performed by: FAMILY MEDICINE

## 2017-11-29 RX ORDER — SCOLOPAMINE TRANSDERMAL SYSTEM 1 MG/1
PATCH, EXTENDED RELEASE TRANSDERMAL
Qty: 4 PATCH | Refills: 0 | Status: SHIPPED | OUTPATIENT
Start: 2017-11-29 | End: 2018-03-21

## 2017-11-29 RX ORDER — CIPROFLOXACIN 500 MG/1
500 TABLET, FILM COATED ORAL 2 TIMES DAILY
Qty: 10 TABLET | Refills: 0 | Status: SHIPPED | OUTPATIENT
Start: 2017-11-29 | End: 2018-03-21

## 2017-11-29 NOTE — MR AVS SNAPSHOT
"              After Visit Summary   11/29/2017    Theresa M Delosreyes    MRN: [id number]           Patient Information     Date Of Birth          1960        Visit Information        Provider Department      11/29/2017 1:40 PM Dilan Soto MD [facility] [place]        Today's Diagnoses     Acute cystitis without hematuria    -  1    Visit for screening mammogram        Dysuria        Motion sickness, initial encounter           Follow-ups after your visit        Who to contact     If you have questions or need follow up information about today's clinic visit or your schedule please contact [facility] directly at [phone].  Normal or non-critical lab and imaging results will be communicated to you by ReadyDockhart, letter or phone within 4 business days after the clinic has received the results. If you do not hear from us within 7 days, please contact the clinic through ReadyDockhart or phone. If you have a critical or abnormal lab result, we will notify you by phone as soon as possible.  Submit refill requests through iJukebox or call your pharmacy and they will forward the refill request to us. Please allow 3 business days for your refill to be completed.          Additional Information About Your Visit        MyChart Information     iJukebox gives you secure access to your electronic health record. If you see a primary care provider, you can also send messages to your care team and make appointments. If you have questions, please call your primary care clinic.  If you do not have a primary care provider, please call [phone] and they will assist you.        Care EveryWhere ID     This is your Care EveryWhere ID. This could be used by other organizations to access your [facility] medical records  [id number]        Your Vitals Were     Pulse Temperature Respirations Height Last Period Pulse Oximetry    78 99.1  F (37.3  C) (Tympanic) 16 5' 3\" (1.6 m) 02/23/2013 99%       Blood Pressure " from Last 3 Encounters:   11/29/17 142/82   11/10/17 126/70   08/03/17 131/82    Weight from Last 3 Encounters:   11/10/17 201 lb (91.2 kg)   08/16/17 200 lb (90.7 kg)   08/03/17 199 lb (90.3 kg)              We Performed the Following     UA reflex to Microscopic and Culture     Urine Culture Aerobic Bacterial     Urine Microscopic          Today's Medication Changes          These changes are accurate as of: 11/29/17  2:21 PM.  If you have any questions, ask your nurse or doctor.               Start taking these medicines.        Dose/Directions    ciprofloxacin 500 MG tablet   Commonly known as:  CIPRO   Used for:  Acute cystitis without hematuria   Started by:  Dilan Soto MD        Dose:  500 mg   Take 1 tablet (500 mg) by mouth 2 times daily   Quantity:  10 tablet   Refills:  0       scopolamine 72 hr patch   Commonly known as:  TRANSDERM   Used for:  Motion sickness, initial encounter   Started by:  Dilan Soto MD        Apply 1 patch to hairless area behind one ear at least 4 hours before travel.  Remove old patch and change every 3 days (72 hours).   Quantity:  4 patch   Refills:  0            Where to get your medicines      These medications were sent to [id number] - [place] [address] [facility]  [address] [facility] [address]     Phone:  [phone]     ciprofloxacin 500 MG tablet    scopolamine 72 hr patch                Primary Care Provider Office Phone # Fax #    Dilan Soto [phone]       [address] [facility] [address]        Equal Access to Services     [facility] AH: Hadii aad ku hadasho Soomaali, waaxda luqadaha, qaybta kaalmada adeegyada, waxay clint ruano. So [facility] [phone].    ATENCIÓN: Si habla español, tiene a gil disposición servicios gratuitos de asistencia lingüística. Llame al [phone].    We comply with applicable federal civil rights laws and [place] laws. We do not discriminate on the basis of  race, color, national origin, age, disability, sex, sexual orientation, or gender identity.            Thank you!     Thank you for choosing [facility]  for your care. Our goal is always to provide you with excellent care. Hearing back from our patients is one way we can continue to improve our services. Please take a few minutes to complete the written survey that you may receive in the mail after your visit with us. Thank you!             Your Updated Medication List - Protect others around you: Learn how to safely use, store and throw away your medicines at www.disposemymeds.org.          This list is accurate as of: 11/29/17  2:21 PM.  Always use your most recent med list.                   Brand Name Dispense Instructions for use Diagnosis    candesartan cilexetil 32 MG Tabs     90 tablet    Take 32 mg by mouth daily    Benign essential hypertension       carvedilol 25 MG tablet    COREG    60 tablet    Take 1 tablet (25 mg) by mouth 2 times daily (with meals)    Benign essential hypertension       ciprofloxacin 500 MG tablet    CIPRO    10 tablet    Take 1 tablet (500 mg) by mouth 2 times daily    Acute cystitis without hematuria       letrozole 2.5 MG tablet    FEMARA     Take 2.5 mg by mouth At Bedtime        scopolamine 72 hr patch    TRANSDERM    4 patch    Apply 1 patch to hairless area behind one ear at least 4 hours before travel.  Remove old patch and change every 3 days (72 hours).    Motion sickness, initial encounter       VITAMIN B12 PO      Take by mouth daily        VITAMIN D (CHOLECALCIFEROL) PO      Take 3,000 Units by mouth daily

## 2017-11-29 NOTE — PROGRESS NOTES
"  SUBJECTIVE:   Theresa M Delosreyes is a 57 year old female who presents to clinic today for the following health issues:      URINARY TRACT SYMPTOMS      Duration: 3 days     Description  dysuria    Intensity:  moderate    Accompanying signs and symptoms:  Fever/chills: YES  Flank pain no   Nausea and vomiting: no   Vaginal symptoms: none  Abdominal/Pelvic Pain: no     History  History of frequent UTI's: YES- years ago  History of kidney stones: no   Sexually Active: YES  Possibility of pregnancy: No    Precipitating or alleviating factors: None    Therapies tried and outcome: increased liquid intake         Problem list and histories reviewed & adjusted, as indicated.  Additional history: as documented    Patient Active Problem List   Diagnosis     Female stress incontinence     Status post laparoscopic cholecystectomy     Osteoarthritis of both knees     Obesity (BMI 30-39.9)     BRIT (obstructive sleep apnea)     Hyperlipidemia LDL goal <130     Hypertension goal BP (blood pressure) < 140/90     Chest pain     Infiltrating ductal carcinoma of right female breast (H)     Antineoplastic antibiotics causing adverse effect in therapeutic use     Breast cancer (H)     Benign essential hypertension     Drugs and medicinal substances causing adverse effect in therapeutic use, initial encounter     Past Surgical History:   Procedure Laterality Date     COLONOSCOPY  5/2006    ormal to lt transverse colon, incomplete due to \"extremely redundant colon\"\"     D & C  9/2004     EXTRACT LENS CLEAR, EXCHANGE LENS REFRACTIVE, COMBINED  3/2013    bilateral     GRAFT FAT TO BREAST Bilateral 11/18/2016    Procedure: GRAFT FAT TO BREAST;  Surgeon: Alhaji Patton MD;  [facility]     GRAFT FREE VASCULARIZED TRANSVERSE RECTUS ABDOMINIS MYOCUTANEOUS Bilateral 4/12/2016    Procedure: GRAFT FREE VASCULARIZED TRANSVERSE RECTUS ABDOMINIS MYOCUTANEOUS;  Surgeon: Alhaji Patton MD;  Location:  OR     HC ABLATION, " ENDOMETRIAL, THERMAL, W/O HYSTEROSCOPIC GUIDANCE  11/2004    NovaSure endometrial ablation.      HC COLON AIR CONTRAST  5/2006    normal     HC SLING OPERATION FOR STRESS INCONTINENCE  1/2007    Monarc sling procedure     INSERT PORT VASCULAR ACCESS Left 11/9/2015    Procedure: INSERT PORT VASCULAR ACCESS;  Surgeon: August Rdz MD;  Location: [facility]     INSERT TISSUE EXPANDER BREAST BILATERAL Bilateral 9/29/2015    Procedure: INSERT TISSUE EXPANDER BREAST BILATERAL;  Surgeon: Alhaji Patton MD;  Location:  OR     LAPAROSCOPIC CHOLECYSTECTOMY WITH CHOLANGIOGRAMS  1/2005    Laparoscopic cholecystectomy with intraoperative  cholangiogram     MASTECTOMY SIMPLE BILATERAL, SENTINEL NODE BILATERAL, COMBINED Bilateral 9/29/2015    Procedure: COMBINED MASTECTOMY SIMPLE BILATERAL, SENTINEL NODE BILATERAL;  Surgeon: August Rdz MD;  Location:  OR     RECONSTRUCT BREAST BILATERAL Bilateral 11/18/2016    Procedure: RECONSTRUCT BREAST BILATERAL;  Surgeon: Alhaji Patton MD;  [facility]     REMOVE PORT VASCULAR ACCESS N/A 11/18/2016    Procedure: REMOVE PORT VASCULAR ACCESS;  Surgeon: Alhaji Patton MD;  [facility]     REMOVE TISSUE EXPANDER BREAST Bilateral 4/12/2016    Procedure: REMOVE TISSUE EXPANDER BREAST;  Surgeon: Alhaji Patton MD;  Location:  OR     REVISE SCAR TRUNK N/A 11/18/2016    Procedure: REVISE SCAR TRUNK;  Surgeon: Alhaji Patton MD;  [facility]:  [facility]     TONSILLECTOMY  2001     VASCULAR SURGERY      insert port and removed       Social History   Substance Use Topics     Smoking status: Never Smoker     Smokeless tobacco: Never Used     Alcohol use No     Family History   Problem Relation Age of Onset     Asthma Daughter      C.A.D. Father 40     MI     CEREBROVASCULAR DISEASE Father      Hypertension Mother      Breast Cancer Mother      Thyroid Disease Mother      hypothyroidism     Sleep Apnea Mother      Breast Cancer Sister       Hypertension Sister      Hypertension Brother      HEART DISEASE Brother      pacemaker     Sleep Apnea Brother      Hypertension Brother      DIABETES Brother      type 2     Sleep Apnea Brother      Myocardial Infarction Maternal Grandmother      DIABETES Paternal Grandmother      Unknown/Adopted Maternal Grandfather      Unknown/Adopted Paternal Grandfather      DIABETES Maternal Aunt      Cancer - colorectal Maternal Uncle      DIABETES Maternal Aunt      with retinopathy leading to blindness         Current Outpatient Prescriptions   Medication Sig Dispense Refill     scopolamine (TRANSDERM) 72 hr patch Apply 1 patch to hairless area behind one ear at least 4 hours before travel.  Remove old patch and change every 3 days (72 hours). 4 patch 0     ciprofloxacin (CIPRO) 500 MG tablet Take 1 tablet (500 mg) by mouth 2 times daily 10 tablet 0     Cyanocobalamin (VITAMIN B12 PO) Take by mouth daily       carvedilol (COREG) 25 MG tablet Take 1 tablet (25 mg) by mouth 2 times daily (with meals) 60 tablet 11     candesartan 32 MG TABS Take 32 mg by mouth daily 90 tablet 3     VITAMIN D, CHOLECALCIFEROL, PO Take 3,000 Units by mouth daily       letrozole (FEMARA) 2.5 MG tablet Take 2.5 mg by mouth At Bedtime   0     Allergies   Allergen Reactions     Definity Other (See Comments)     A substance that helps with imaging the heart muscle with echocardiology    Full body ache starting in the low back.      Keflex [Cephalexin]      Hand swelling, blisters???     Recent Labs   Lab Test  07/03/17   0824  02/15/17   1552   04/03/15   0717  09/11/14   1656  08/08/14   0729  03/04/13   0953   10/28/11   0726   LDL  110*   --    --   84   --   112  138*   --   126   HDL  65   --    --   62   --   57  54   --   56   TRIG  146   --    --   119   --   162*  133   --   115   ALT  29   --    --    --    --    --   30   --   13   CR  0.62  0.80   < >  0.64   --   0.82  0.60   --   0.69   GFRESTIMATED  >90  Non  GFR  Calc    74   < >  >90  Non  GFR Calc     --   73  >90   --   >90   GFRESTBLACK  >90   GFR Calc    >90   < >  >90   GFR Calc     --   89  >90   --   >90   POTASSIUM  4.0  4.3   < >  3.6   --   3.9  4.2   --   4.1   TSH   --    --    --   2.34  2.42   --   1.98   < >   --     < > = values in this interval not displayed.      BP Readings from Last 3 Encounters:   17 142/82   11/10/17 126/70   17 131/82    Wt Readings from Last 3 Encounters:   11/10/17 201 lb (91.2 kg)   17 200 lb (90.7 kg)   17 199 lb (90.3 kg)                          Reviewed and updated as needed this visit by clinical staff     Reviewed and updated as needed this visit by Provider         SUBJECTIVE:   Theresa M Delosreyes is a 57 year old female who  presents today for a possible UTI. Symptoms of dysuria and frequency have been going on for 4day(s).  Hematuria no.  sudden onsetand moderate.  There is no history of fever, chills, nausea or vomiting.  No history of vaginal or penile discharge. This patient does have a history of urinary tract infections. Patient denies long duration, rigors, flank pain and temperature > 101 degrees F. or vaginal discharge and vaginal odor     Past Medical History:   Diagnosis Date     Benign hypertension     started medication 3/13     Cancer (H)     breast cancer     Chest pain     side effect of Femera     Female stress incontinence 2006    s/p sling procedure     Mixed hyperlipidemia      Motion sickness      Obesity (BMI 30-39.9)      BRIT (obstructive sleep apnea)     not using CPAP, treated with tonsilliectomy     Osteoarthritis of both knees      PONV (postoperative nausea and vomiting)      Status post laparoscopic cholecystectomy 2005    Cholelithiasis with mild subacute hemorrhagic cholecystitis     Supervision of other normal pregnancy      - vaginal     Uncomplicated asthma     with respiratory infections  "    Current Outpatient Prescriptions   Medication Sig Dispense Refill     scopolamine (TRANSDERM) 72 hr patch Apply 1 patch to hairless area behind one ear at least 4 hours before travel.  Remove old patch and change every 3 days (72 hours). 4 patch 0     ciprofloxacin (CIPRO) 500 MG tablet Take 1 tablet (500 mg) by mouth 2 times daily 10 tablet 0     Cyanocobalamin (VITAMIN B12 PO) Take by mouth daily       carvedilol (COREG) 25 MG tablet Take 1 tablet (25 mg) by mouth 2 times daily (with meals) 60 tablet 11     candesartan 32 MG TABS Take 32 mg by mouth daily 90 tablet 3     VITAMIN D, CHOLECALCIFEROL, PO Take 3,000 Units by mouth daily       letrozole (FEMARA) 2.5 MG tablet Take 2.5 mg by mouth At Bedtime   0     Social History   Substance Use Topics     Smoking status: Never Smoker     Smokeless tobacco: Never Used     Alcohol use No       ROS:   Review of systems negative except as stated above.    OBJECTIVE:  /82 (Cuff Size: Adult Large)  Pulse 78  Temp 99.1  F (37.3  C) (Tympanic)  Resp 16  Ht 5' 3\" (1.6 m)  LMP 02/23/2013  SpO2 99%  GENERAL APPEARANCE: healthy, alert and no distress  RESP: lungs clear to auscultation - no rales, rhonchi or wheezes  CV: regular rates and rhythm, normal S1 S2, no murmur noted  ABDOMEN:  soft, nontender, no HSM or masses and bowel sounds normal  BACK: No CVA tenderness  SKIN: no suspicious lesions or rashes    ASSESSMENT:   Melina was seen today for uti.    Diagnoses and all orders for this visit:    Acute cystitis without hematuria  -     ciprofloxacin (CIPRO) 500 MG tablet; Take 1 tablet (500 mg) by mouth 2 times daily    Visit for screening mammogram  -     Urine Microscopic    Dysuria  -     UA reflex to Microscopic and Culture  -     Urine Culture Aerobic Bacterial    Motion sickness, initial encounter  -     scopolamine (TRANSDERM) 72 hr patch; Apply 1 patch to hairless area behind one ear at least 4 hours before travel.  Remove old patch and change every 3 " days (72 hours).    Other orders  -     Cancel: MA SCREENING DIGITAL BILAT - Future  (s+30); Future      Drink plenty of fluids.  Prevention and treatment of UTI's discussed.Signs and symptoms of pyelonephritis mentioned.  Follow up with primary care physician if not improving

## 2017-11-29 NOTE — NURSING NOTE
"Chief Complaint   Patient presents with     UTI       Initial /82 (Cuff Size: Adult Large)  Pulse 78  Temp 99.1  F (37.3  C) (Tympanic)  Resp 16  Ht 5' 3\" (1.6 m)  LMP 02/23/2013  SpO2 99% Estimated body mass index is 35.61 kg/(m^2) as calculated from the following:    Height as of 11/10/17: 5' 3\" (1.6 m).    Weight as of 11/10/17: 201 lb (91.2 kg).  Medication Reconciliation: complete   Jaquelin Weir, CMA    "

## 2017-12-01 LAB
BACTERIA SPEC CULT: ABNORMAL
SPECIMEN SOURCE: ABNORMAL

## 2017-12-05 ENCOUNTER — TRANSFERRED RECORDS (OUTPATIENT)
Dept: HEALTH INFORMATION MANAGEMENT | Facility: CLINIC | Age: 57
End: 2017-12-05

## 2018-01-02 ENCOUNTER — TRANSFERRED RECORDS (OUTPATIENT)
Dept: HEALTH INFORMATION MANAGEMENT | Facility: CLINIC | Age: 58
End: 2018-01-02

## 2018-01-22 DIAGNOSIS — I10 BENIGN ESSENTIAL HYPERTENSION: ICD-10-CM

## 2018-01-22 RX ORDER — CANDESARTAN 32 MG/1
32 TABLET ORAL DAILY
Qty: 90 TABLET | Refills: 1 | Status: SHIPPED | OUTPATIENT
Start: 2018-01-22 | End: 2018-07-27

## 2018-01-25 RX ORDER — CARVEDILOL 25 MG/1
25 TABLET ORAL 2 TIMES DAILY WITH MEALS
Qty: 180 TABLET | Refills: 1 | Status: SHIPPED | OUTPATIENT
Start: 2018-01-25 | End: 2018-06-01

## 2018-01-26 ENCOUNTER — HOSPITAL ENCOUNTER (OUTPATIENT)
Dept: MAMMOGRAPHY | Facility: CLINIC | Age: 58
Discharge: HOME | End: 2018-01-26
Attending: OBSTETRICS & GYNECOLOGY | Admitting: OBSTETRICS & GYNECOLOGY
Payer: COMMERCIAL

## 2018-01-26 DIAGNOSIS — N63.0 BREAST LUMP: ICD-10-CM

## 2018-01-26 PROCEDURE — 76642 ULTRASOUND BREAST LIMITED: CPT | Mod: RT

## 2018-02-13 ENCOUNTER — TRANSFERRED RECORDS (OUTPATIENT)
Dept: HEALTH INFORMATION MANAGEMENT | Facility: CLINIC | Age: 58
End: 2018-02-13

## 2018-03-21 ENCOUNTER — OFFICE VISIT (OUTPATIENT)
Dept: FAMILY MEDICINE | Facility: CLINIC | Age: 58
End: 2018-03-21
Payer: COMMERCIAL

## 2018-03-21 VITALS
WEIGHT: 200 LBS | OXYGEN SATURATION: 99 % | TEMPERATURE: 99.2 F | BODY MASS INDEX: 35.44 KG/M2 | SYSTOLIC BLOOD PRESSURE: 138 MMHG | HEIGHT: 63 IN | HEART RATE: 71 BPM | RESPIRATION RATE: 16 BRPM | DIASTOLIC BLOOD PRESSURE: 86 MMHG

## 2018-03-21 DIAGNOSIS — J01.90 ACUTE SINUSITIS WITH SYMPTOMS > 10 DAYS: Primary | ICD-10-CM

## 2018-03-21 PROCEDURE — 99213 OFFICE O/P EST LOW 20 MIN: CPT | Performed by: FAMILY MEDICINE

## 2018-03-21 RX ORDER — DOXYCYCLINE 100 MG/1
100 TABLET ORAL 2 TIMES DAILY
Qty: 20 TABLET | Refills: 0 | Status: SHIPPED | OUTPATIENT
Start: 2018-03-21 | End: 2018-03-21

## 2018-03-21 RX ORDER — DOXYCYCLINE 100 MG/1
100 TABLET ORAL 2 TIMES DAILY
Qty: 20 TABLET | Refills: 0
Start: 2018-03-21 | End: 2018-06-01

## 2018-03-21 NOTE — PROGRESS NOTES
"  SUBJECTIVE:   Theresa M Delosreyes is a 57 year old female who presents to clinic today for the following health issues:      RESPIRATORY SYMPTOMS      Duration: 1 day     Description  nasal congestion, rhinorrhea, sore throat, facial pain/pressure, cough and headache    Severity: moderate    Accompanying signs and symptoms: None    History (predisposing factors):  none    Precipitating or alleviating factors: None    Therapies tried and outcome:  Sudafed, Tylenol, Advil     Problem list and histories reviewed & adjusted, as indicated.  Additional history: as documented    Patient Active Problem List   Diagnosis     Female stress incontinence     Status post laparoscopic cholecystectomy     Osteoarthritis of both knees     Obesity (BMI 30-39.9)     BRIT (obstructive sleep apnea)     Hyperlipidemia LDL goal <130     Hypertension goal BP (blood pressure) < 140/90     Chest pain     Infiltrating ductal carcinoma of right female breast (H)     Antineoplastic antibiotics causing adverse effect in therapeutic use     Breast cancer (H)     Benign essential hypertension     Drugs and medicinal substances causing adverse effect in therapeutic use, initial encounter     Past Surgical History:   Procedure Laterality Date     COLONOSCOPY  5/2006    ormal to lt transverse colon, incomplete due to \"extremely redundant colon\"\"     D & C  9/2004     EXTRACT LENS CLEAR, EXCHANGE LENS REFRACTIVE, COMBINED  3/2013    bilateral     GRAFT FAT TO BREAST Bilateral 11/18/2016    Procedure: GRAFT FAT TO BREAST;  Surgeon: Alhaji Patton MD;  [facility]: [facility]     GRAFT FREE VASCULARIZED TRANSVERSE RECTUS ABDOMINIS MYOCUTANEOUS Bilateral 4/12/2016    Procedure: GRAFT FREE VASCULARIZED TRANSVERSE RECTUS ABDOMINIS MYOCUTANEOUS;  Surgeon: Alhaji Patton MD;  Location: [facility]     HC ABLATION, ENDOMETRIAL, THERMAL, W/O HYSTEROSCOPIC GUIDANCE  11/2004    NovaSure endometrial ablation.      HC COLON AIR CONTRAST  5/2006    normal "     HC SLING OPERATION FOR STRESS INCONTINENCE  1/2007    Monarc sling procedure     INSERT PORT VASCULAR ACCESS Left 11/9/2015    Procedure: INSERT PORT VASCULAR ACCESS;  Surgeon: August Rdz MD;  [facility]     INSERT TISSUE EXPANDER BREAST BILATERAL Bilateral 9/29/2015    Procedure: INSERT TISSUE EXPANDER BREAST BILATERAL;  Surgeon: Alhaji Patton MD;  Location:  OR     LAPAROSCOPIC CHOLECYSTECTOMY WITH CHOLANGIOGRAMS  1/2005    Laparoscopic cholecystectomy with intraoperative  cholangiogram     MASTECTOMY SIMPLE BILATERAL, SENTINEL NODE BILATERAL, COMBINED Bilateral 9/29/2015    Procedure: COMBINED MASTECTOMY SIMPLE BILATERAL, SENTINEL NODE BILATERAL;  Surgeon: August Rdz MD;  Location:  OR     RECONSTRUCT BREAST BILATERAL Bilateral 11/18/2016    Procedure: RECONSTRUCT BREAST BILATERAL;  Surgeon: Alhaji Patton MD;  [facility]:  [facility]     REMOVE PORT VASCULAR ACCESS N/A 11/18/2016    Procedure: REMOVE PORT VASCULAR ACCESS;  Surgeon: Alhaji Patton MD;  [facility]     REMOVE TISSUE EXPANDER BREAST Bilateral 4/12/2016    Procedure: REMOVE TISSUE EXPANDER BREAST;  Surgeon: Alhaji Patton MD;  Location:  OR     REVISE SCAR TRUNK N/A 11/18/2016    Procedure: REVISE SCAR TRUNK;  Surgeon: Alhaji Patton MD;  [facility]:  [facility]     TONSILLECTOMY  2001     VASCULAR SURGERY      insert port and removed       Social History   Substance Use Topics     Smoking status: Never Smoker     Smokeless tobacco: Never Used     Alcohol use No     Family History   Problem Relation Age of Onset     Asthma Daughter      C.A.D. Father 40     MI     CEREBROVASCULAR DISEASE Father      Hypertension Mother      Breast Cancer Mother      Thyroid Disease Mother      hypothyroidism     Sleep Apnea Mother      Breast Cancer Sister      Hypertension Sister      Hypertension Brother      HEART DISEASE Brother      pacemaker     Sleep Apnea Brother      Hypertension  Brother      DIABETES Brother      type 2     Sleep Apnea Brother      Myocardial Infarction Maternal Grandmother      DIABETES Paternal Grandmother      Unknown/Adopted Maternal Grandfather      Unknown/Adopted Paternal Grandfather      DIABETES Maternal Aunt      Cancer - colorectal Maternal Uncle      DIABETES Maternal Aunt      with retinopathy leading to blindness         Current Outpatient Prescriptions   Medication Sig Dispense Refill     doxycycline Monohydrate 100 MG TABS Take 100 mg by mouth 2 times daily 20 tablet 0     carvedilol (COREG) 25 MG tablet Take 1 tablet (25 mg) by mouth 2 times daily (with meals) 180 tablet 1     candesartan cilexetil 32 MG TABS Take 32 mg by mouth daily 90 tablet 1     Cyanocobalamin (VITAMIN B12 PO) Take by mouth daily       VITAMIN D, CHOLECALCIFEROL, PO Take 3,000 Units by mouth daily       letrozole (FEMARA) 2.5 MG tablet Take 2.5 mg by mouth At Bedtime   0     Allergies   Allergen Reactions     Definity Other (See Comments)     A substance that helps with imaging the heart muscle with echocardiology    Full body ache starting in the low back.      Keflex [Cephalexin]      Hand swelling, blisters???     Recent Labs   Lab Test  07/03/17   0824  02/15/17   1552   04/03/15   0717  09/11/14   1656  08/08/14   0729  03/04/13   0953   10/28/11   0726   LDL  110*   --    --   84   --   112  138*   --   126   HDL  65   --    --   62   --   57  54   --   56   TRIG  146   --    --   119   --   162*  133   --   115   ALT  29   --    --    --    --    --   30   --   13   CR  0.62  0.80   < >  0.64   --   0.82  0.60   --   0.69   GFRESTIMATED  >90  Non  GFR Calc    74   < >  >90  Non  GFR Calc     --   73  >90   --   >90   GFRESTBLACK  >90   GFR Calc    >90   < >  >90   GFR Calc     --   89  >90   --   >90   POTASSIUM  4.0  4.3   < >  3.6   --   3.9  4.2   --   4.1   TSH   --    --    --   2.34  2.42   --   1.98   < >    "--     < > = values in this interval not displayed.      BP Readings from Last 3 Encounters:   03/21/18 138/86   11/29/17 142/82   11/10/17 126/70    Wt Readings from Last 3 Encounters:   03/21/18 200 lb (90.7 kg)   11/10/17 201 lb (91.2 kg)   08/16/17 200 lb (90.7 kg)                    Reviewed and updated as needed this visit by clinical staff  Allergies       Reviewed and updated as needed this visit by Provider         ROS:  Constitutional, HEENT, cardiovascular, pulmonary, gi and gu systems are negative, except as otherwise noted.    OBJECTIVE:     /86 (Cuff Size: Adult Large)  Pulse 71  Temp 99.2  F (37.3  C) (Tympanic)  Resp 16  Ht 5' 3\" (1.6 m)  Wt 200 lb (90.7 kg)  LMP 02/23/2013  SpO2 99%  BMI 35.43 kg/m2  Body mass index is 35.43 kg/(m^2).  GENERAL: healthy, alert and no distress  NECK: no adenopathy, no asymmetry, masses, or scars and thyroid normal to palpation  RESP: lungs clear to auscultation - no rales, rhonchi or wheezes  CV: regular rate and rhythm, normal S1 S2, no S3 or S4, no murmur, click or rub, no peripheral edema and peripheral pulses strong  ABDOMEN: soft, nontender, no hepatosplenomegaly, no masses and bowel sounds normal  MS: no gross musculoskeletal defects noted, no edema        ASSESSMENT/PLAN:   Diagnoses and all orders for this visit:    Acute sinusitis with symptoms > 10 days  -     Discontinue: doxycycline Monohydrate 100 MG TABS; Take 100 mg by mouth 2 times daily for 14 days  -     doxycycline Monohydrate 100 MG TABS; Take 100 mg by mouth 2 times daily        Dilan Soto MD  [facility]    "

## 2018-03-21 NOTE — MR AVS SNAPSHOT
"              After Visit Summary   3/21/2018    Theresa M Delosreyes    MRN: [id number]           Patient Information     Date Of Birth          1960        Visit Information        Provider Department      3/21/2018 3:40 PM Dilan Soto MD [facility] [place]        Today's Diagnoses     Acute sinusitis with symptoms > 10 days    -  1       Follow-ups after your visit        Follow-up notes from your care team     Return in about 6 months (around 9/21/2018) for Physical Exam.      Who to contact     If you have questions or need follow up information about today's clinic visit or your schedule please contact [facility] directly at [phone].  Normal or non-critical lab and imaging results will be communicated to you by MyChart, letter or phone within 4 business days after the clinic has received the results. If you do not hear from us within 7 days, please contact the clinic through Regal[employer] or phone. If you have a critical or abnormal lab result, we will notify you by phone as soon as possible.  Submit refill requests through [employer] or call your pharmacy and they will forward the refill request to us. Please allow 3 business days for your refill to be completed.          Additional Information About Your Visit        MyChart Information     [employer] gives you secure access to your electronic health record. If you see a primary care provider, you can also send messages to your care team and make appointments. If you have questions, please call your primary care clinic.  If you do not have a primary care provider, please call [phone] and they will assist you.        Care EveryWhere ID     This is your Care EveryWhere ID. This could be used by other organizations to access your [place] medical records  [id number]        Your Vitals Were     Pulse Temperature Respirations Height Last Period Pulse Oximetry    71 99.2  F (37.3  C) (Tympanic) 16 5' 3\" (1.6 m) 02/23/2013 99%    " BMI (Body Mass Index)                   35.43 kg/m2            Blood Pressure from Last 3 Encounters:   03/21/18 138/86   11/29/17 142/82   11/10/17 126/70    Weight from Last 3 Encounters:   03/21/18 200 lb (90.7 kg)   11/10/17 201 lb (91.2 kg)   08/16/17 200 lb (90.7 kg)              Today, you had the following     No orders found for display         Today's Medication Changes          These changes are accurate as of 3/21/18  4:10 PM.  If you have any questions, ask your nurse or doctor.               Start taking these medicines.        Dose/Directions    doxycycline Monohydrate 100 MG Tabs   Used for:  Acute sinusitis with symptoms > 10 days   Started by:  Dilan Soto MD        Dose:  100 mg   Take 100 mg by mouth 2 times daily   Quantity:  20 tablet   Refills:  0            Where to get your medicines      Some of these will need a paper prescription and others can be bought over the counter.  Ask your nurse if you have questions.     You don't need a prescription for these medications     doxycycline Monohydrate 100 MG Tabs                Primary Care Provider Office Phone # Fax #    Dilan Soto [phone]       [address] [place], [place] [address]        Equal Access to Services     LYDIA WALKER AH: Hadii becka nicholas hadasho Soomaali, waaxda luqadaha, qaybta kaalmada adeegyada, chastity ruano. So [facility] [phone].    ATENCIÓN: Si habla español, tiene a gil disposición servicios gratuitos de asistencia lingüística. Llame al [phone].    We comply with applicable federal civil rights laws and [place] laws. We do not discriminate on the basis of race, color, national origin, age, disability, sex, sexual orientation, or gender identity.            Thank you!     Thank you for choosing [facility]  for your care. Our goal is always to provide you with excellent care. Hearing back from our patients is one way we can continue to improve our  services. Please take a few minutes to complete the written survey that you may receive in the mail after your visit with us. Thank you!             Your Updated Medication List - Protect others around you: Learn how to safely use, store and throw away your medicines at www.disposemymeds.org.          This list is accurate as of 3/21/18  4:10 PM.  Always use your most recent med list.                   Brand Name Dispense Instructions for use Diagnosis    candesartan cilexetil 32 MG Tabs     90 tablet    Take 32 mg by mouth daily    Benign essential hypertension       carvedilol 25 MG tablet    COREG    180 tablet    Take 1 tablet (25 mg) by mouth 2 times daily (with meals)    Benign essential hypertension       doxycycline Monohydrate 100 MG Tabs     20 tablet    Take 100 mg by mouth 2 times daily    Acute sinusitis with symptoms > 10 days       letrozole 2.5 MG tablet    FEMARA     Take 2.5 mg by mouth At Bedtime        VITAMIN B12 PO      Take by mouth daily        VITAMIN D (CHOLECALCIFEROL) PO      Take 3,000 Units by mouth daily

## 2018-03-21 NOTE — NURSING NOTE
"No chief complaint on file.      Initial /86 (Cuff Size: Adult Large)  Pulse 71  Temp 99.2  F (37.3  C) (Tympanic)  Resp 16  Ht 5' 3\" (1.6 m)  Wt 200 lb (90.7 kg)  LMP 02/23/2013  SpO2 99%  BMI 35.43 kg/m2 Estimated body mass index is 35.43 kg/(m^2) as calculated from the following:    Height as of this encounter: 5' 3\" (1.6 m).    Weight as of this encounter: 200 lb (90.7 kg).  Medication Reconciliation: complete   Jaquelin Weir, CMA    "

## 2018-04-12 ENCOUNTER — TRANSFERRED RECORDS (OUTPATIENT)
Dept: HEALTH INFORMATION MANAGEMENT | Facility: CLINIC | Age: 58
End: 2018-04-12

## 2018-05-03 ENCOUNTER — ONCOLOGY VISIT (OUTPATIENT)
Dept: ONCOLOGY | Facility: CLINIC | Age: 58
End: 2018-05-03
Attending: GENETIC COUNSELOR, MS
Payer: COMMERCIAL

## 2018-05-03 DIAGNOSIS — Z17.0 MALIGNANT NEOPLASM OF LOWER-INNER QUADRANT OF RIGHT BREAST OF FEMALE, ESTROGEN RECEPTOR POSITIVE (H): ICD-10-CM

## 2018-05-03 DIAGNOSIS — Z80.3 FAMILY HISTORY OF MALIGNANT NEOPLASM OF BREAST: Primary | ICD-10-CM

## 2018-05-03 DIAGNOSIS — C50.311 MALIGNANT NEOPLASM OF LOWER-INNER QUADRANT OF RIGHT BREAST OF FEMALE, ESTROGEN RECEPTOR POSITIVE (H): ICD-10-CM

## 2018-05-03 DIAGNOSIS — Z80.6 FAMILY HISTORY OF LEUKEMIA: ICD-10-CM

## 2018-05-03 PROCEDURE — 96040 ZZH GENETIC COUNSELING, EACH 30 MINUTES: CPT | Performed by: GENETIC COUNSELOR, MS

## 2018-05-03 NOTE — LETTER
Cancer Risk Management  Program Locations    [facility]  Mailing Address  Cancer Risk Management Program  [facility]  [address]  [place] [address]    New patient appointments  [phone]  May 3, 2018    Theresa M Delosreyes  [address]  [facility] [address]      Dear Melina,  It was a pleasure meeting you and your daughter today.  This is a summary of your genetic counseling visit.    5/3/2018    Referring Provider: Radha Leggett MD    Presenting Information: I met with Theresa Delosreyes and her daughter today for genetic [facility] to discuss her personal and family history of breast  cancer.  She had genetic counseling in November 2015 with Giselle Garza, Genetic Counselor.  See note from 11/12/2015    Personal History:  Melina is a 57 year old female.  She was diagnosed with infiltrating ductal breast cancer at age 54. Her treatment included a bilateral mastectomy, chemotherapy, radiation and hormone therapy.  In 2015, she was negative for mutations in the 17 genes on the BreastNext panel (GANESH, BARD1, BRCA1, BRCA2, BRIP1, CDH1, CHEK2, MRE11A, MUTYH, NBN, NF1, PALB2, PTEN, RAD50, RAD51C, RAD51D, and TP53) from [employer].     Family History: (Please see scanned pedigree for detailed family history information from November 2015 ) Additional family history includes:    Her sister was diagnosed with leukemia at age  64 and is now 65 years old.    A paternal cousin was diagnosed with  breast cancer at her current age of 59.  She had genetic testing for hereditary breast cancer and was negative. The type of test she had is unknown.     Discussion:    No additional genes for hereditary breast cancer have been identified since  Melina had genetic testing in 2015. The test report was reviewed at this visit.  I encouraged Melina to check in again with a genetic counselor in two years to determine if new genes for hereditary breast cancer had been identified.      Based on the additional family history information presented by Melina today, no genetic testing was indicated at this visit.    Screening based on family history:      Because we do not know why Melina developed breast cancer and we haven't explained the family history of breast cancer, her daughters are still at increased risk of breast cancer.     They should start their breast cancer screening 10 years before the earliest diagnosis of breast cancer in the family which is 45 years. The screening should begin when Melina's daughters are 35 years.    I encouraged her daughter who was at this visit to make an appointment with a genetic counselor to better determine her risk of cancer and learn more about high risk breast cancer screening.     Plan:  1) Melina agreed with my assessment and no genetic testing was done at this visit. .   2) Information regarding recommended screening for Melina's daughters, based upon the family history, was reviewed.  Her daughter was encouraged to meet with a genetic counselor to better define her risk of breast cancer.  3) Melina will contact our clinic in two years or sooner,  if the family  history changes.     My contact information (phone ) was provided.     Shilpi Cruz MS [facility]  Genetic Counselor  [phone]    Face to face time: 25 minutes

## 2018-05-03 NOTE — PROGRESS NOTES
5/3/2018    Referring Provider: Radha Leggett MD    Presenting Information: I met with Theresa Delosreyes and her daughter today for genetic counseling at the Cancer Risk Management Program at the [facility] to discuss her personal and family history of breast  cancer.  She had genetic counseling in November 2015 with Giselle Garza, Genetic Counselor.  See note from 11/12/2015    Personal History:  Melina is a 57 year old female.  She was diagnosed with infiltrating ductal breast cancer at age 54. Her treatment included a bilateral mastectomy, chemotherapy, radiation and hormone therapy.  In 2015, she was negative for mutations in the 17 genes on the BreastNext panel (GANESH, BARD1, BRCA1, BRCA2, BRIP1, CDH1, CHEK2, MRE11A, MUTYH, NBN, NF1, PALB2, PTEN, RAD50, RAD51C, RAD51D, and TP53) from [employer].     Family History: (Please see scanned pedigree for detailed family history information from November 2015 ) Additional family history includes:    Her sister was diagnosed with leukemia at age  64 and is now 65 years old.    A paternal cousin was diagnosed with  breast cancer at her current age of 59.  She had genetic testing for hereditary breast cancer and was negative. The type of test she had is unknown.     Discussion:    No additional genes for hereditary breast cancer have been identified since Melina had genetic testing in 2015. The test report was reviewed at this visit.  I encouraged Melina to check in again with a genetic counselor in two years to determine if new genes for hereditary breast cancer had been identified.      Based on the additional family history information presented by Melina today, no genetic testing was indicated at this visit.    Screening based on family history:      Because we do not know why Melina developed breast cancer and we haven't explained the family history of breast cancer, her daughters are still at increased risk of breast cancer.     They should start their  breast cancer screening 10 years before the earliest diagnosis of breast cancer in the family which is 45 years. The screening should begin when Melina's daughters are 35 years.    I encouraged her daughter who was at this visit to make an appointment with a genetic counselor to better determine her risk of cancer and learn more about high risk breast cancer screening.     Plan:  1) Melina agreed with my assessment and no genetic testing was done at this visit. .   2) Information regarding recommended screening for Melina's daughters, based upon the family history, was reviewed.  Her daughter was encouraged to meet with a genetic counselor to better define her risk of breast cancer.  3) Melina will contact our clinic in two years or sooner,  if the family  history changes.     My contact information (phone ) was provided.     Shilpi Cruz MS [facility]  Genetic Counselor  [phone]    Face to face time: 25 minutes

## 2018-05-03 NOTE — MR AVS SNAPSHOT
After Visit Summary   5/3/2018    Theresa M Delosreyes    MRN: [id number]           Patient Information     Date Of Birth          1960        Visit Information        Provider Department      5/3/2018 8:00 AM Shilpi Cruz GC Cancer Risk Management Program        Today's Diagnoses     Family history of malignant neoplasm of breast    -  1    Family history of leukemia        Malignant neoplasm of lower-inner quadrant of right breast of female, estrogen receptor positive (H)           Follow-ups after your visit        Your next 10 appointments already scheduled     May 04, 2018  9:00 AM CDT   DX HIP/PELVIS/SPINE with SHMADX1   [facility]    [address] [address] [phone]           Please do not take any of the following 24 hours prior to the day of your exam: vitamins, calcium tablets, antacids.  If possible, please wear clothes without metal (snaps, zippers). A sweatsuit works well.              Who to contact     If you have questions or need follow up information about today's clinic visit or your schedule please contact CANCER RISK MANAGEMENT PROGRAM directly at [phone].  Normal or non-critical lab and imaging results will be communicated to you by [employer], letter or phone within 4 business days after the clinic has received the results. If you do not hear from us within 7 days, please contact the clinic through [employer] or phone. If you have a critical or abnormal lab result, we will notify you by phone as soon as possible.  Submit refill requests through [employer] or call your pharmacy and they will forward the refill request to us. Please allow 3 business days for your refill to be completed.          Additional Information About Your Visit        MyChart Information     [employer] gives you secure access to your electronic health record. If you see a primary care provider, you can also send messages to your  care team and make appointments. If you have questions, please call your primary care clinic.  If you do not have a primary care provider, please call [phone] and they will assist you.        Care EveryWhere ID     This is your Care EveryWhere ID. This could be used by other organizations to access your [place] medical records  [id number]        Your Vitals Were     Last Period                   02/23/2013            Blood Pressure from Last 3 Encounters:   03/21/18 138/86   11/29/17 142/82   11/10/17 126/70    Weight from Last 3 Encounters:   03/21/18 90.7 kg (200 lb)   11/10/17 91.2 kg (201 lb)   08/16/17 90.7 kg (200 lb)              Today, you had the following     No orders found for display       Primary Care Provider Office Phone # Fax #    Dilan Soto -[phone]       [address] [place] [address]        Equal Access to Services     [facility]: Hadii aad ku hadasho Soomaali, waaxda luqadaha, qaybta kaalmada adeegyada, waxay annein hayrubenn richar cuevas . So [facility] [phone].    ATENCIÓN: Si habla español, tiene a gil disposición servicios gratuitos de asistencia lingüística. Llame al [phone].    We comply with applicable federal civil rights laws and [place] laws. We do not discriminate on the basis of race, color, national origin, age, disability, sex, sexual orientation, or gender identity.            Thank you!     Thank you for choosing CANCER RISK MANAGEMENT PROGRAM  for your care. Our goal is always to provide you with excellent care. Hearing back from our patients is one way we can continue to improve our services. Please take a few minutes to complete the written survey that you may receive in the mail after your visit with us. Thank you!             Your Updated Medication List - Protect others around you: Learn how to safely use, store and throw away your medicines at www.disposemymeds.org.          This list is accurate as of 5/3/18  8:43  PM.  Always use your most recent med list.                   Brand Name Dispense Instructions for use Diagnosis    candesartan cilexetil 32 MG Tabs     90 tablet    Take 32 mg by mouth daily    Benign essential hypertension       carvedilol 25 MG tablet    COREG    180 tablet    Take 1 tablet (25 mg) by mouth 2 times daily (with meals)    Benign essential hypertension       doxycycline Monohydrate 100 MG Tabs     20 tablet    Take 100 mg by mouth 2 times daily    Acute sinusitis with symptoms > 10 days       letrozole 2.5 MG tablet    FEMARA     Take 2.5 mg by mouth At Bedtime        VITAMIN B12 PO      Take by mouth daily        VITAMIN D (CHOLECALCIFEROL) PO      Take 3,000 Units by mouth daily

## 2018-05-03 NOTE — LETTER
5/3/2018         RE: Theresa M Delosreyes  [address]  [facility] [address]        Dear Colleague,    Thank you for referring your patient, Theresa M Delosreyes, to the CANCER RISK MANAGEMENT PROGRAM. Please see a copy of my visit note below.    5/3/2018    Referring Provider: Radha Leggett MD    Presenting Information: I met with Theresa Delosreyes and her daughter today for genetic counseling at the Cancer Risk Management Program at the [facility] to discuss her personal and family history of breast  cancer.  She had genetic counseling in November 2015 with Giselle Garza, Genetic Counselor.  See note from 11/12/2015    Personal History:  Melina is a 57 year old female.  She was diagnosed with infiltrating ductal breast cancer at age 54. Her treatment included a bilateral mastectomy, chemotherapy, radiation and hormone therapy.  In 2015, she was negative for mutations in the 17 genes on the BreastNext panel (GANESH, BARD1, BRCA1, BRCA2, BRIP1, CDH1, CHEK2, MRE11A, MUTYH, NBN, NF1, PALB2, PTEN, RAD50, RAD51C, RAD51D, and TP53) from [employer].     Family History: (Please see scanned pedigree for detailed family history information from November 2015 ) Additional family history includes:    Her sister was diagnosed with leukemia at age  64 and is now 65 years old.    A paternal cousin was diagnosed with  breast cancer at her current age of 59.  She had genetic testing for hereditary breast cancer and was negative. The type of test she had is unknown.     Discussion:    No additional genes for hereditary breast cancer have been identified since Melina had genetic testing in 2015. The test report was reviewed at this visit.  I encouraged Melina to check in again with a genetic counselor in two years to determine if new genes for hereditary breast cancer had been identified.      Based on the additional family history information presented by Melina today, no genetic testing was indicated at this  visit.    Screening based on family history:      Because we do not know why Melina developed breast cancer and we haven't explained the family history of breast cancer, her daughters are still at increased risk of breast cancer.     They should start their breast cancer screening 10 years before the earliest diagnosis of breast cancer in the family which is 45 years. The screening should begin when Melina's daughters are 35 years.    I encouraged her daughter who was at this visit to make an appointment with a genetic counselor to better determine her risk of cancer and learn more about high risk breast cancer screening.     Plan:  1) Melina agreed with my assessment and no genetic testing was done at this visit. .   2) Information regarding recommended screening for Melina's daughters, based upon the family history, was reviewed.  Her daughter was encouraged to meet with a genetic counselor to better define her risk of breast cancer.  3) Melina will contact our clinic in two years or sooner,  if the family  history changes.     My contact information (phone ) was provided.     Shilpi Cruz MS [facility]  Genetic Counselor  [phone]    Face to face time: 25 minutes    Again, thank you for allowing me to participate in the care of your patient.        Sincerely,        Shilpi Cruz GC

## 2018-05-04 ENCOUNTER — HOSPITAL ENCOUNTER (OUTPATIENT)
Dept: BONE DENSITY | Facility: CLINIC | Age: 58
Discharge: HOME | End: 2018-05-04
Attending: INTERNAL MEDICINE | Admitting: INTERNAL MEDICINE
Payer: COMMERCIAL

## 2018-05-04 DIAGNOSIS — M85.80 OSTEOPENIA, UNSPECIFIED LOCATION: ICD-10-CM

## 2018-05-04 PROCEDURE — 77080 DXA BONE DENSITY AXIAL: CPT

## 2018-06-01 ENCOUNTER — OFFICE VISIT (OUTPATIENT)
Dept: FAMILY MEDICINE | Facility: CLINIC | Age: 58
End: 2018-06-01
Payer: COMMERCIAL

## 2018-06-01 VITALS
SYSTOLIC BLOOD PRESSURE: 124 MMHG | DIASTOLIC BLOOD PRESSURE: 80 MMHG | BODY MASS INDEX: 35.44 KG/M2 | TEMPERATURE: 99.3 F | HEIGHT: 63 IN | RESPIRATION RATE: 16 BRPM | HEART RATE: 68 BPM | OXYGEN SATURATION: 100 % | WEIGHT: 200 LBS

## 2018-06-01 DIAGNOSIS — Z00.00 HEALTHCARE MAINTENANCE: Primary | ICD-10-CM

## 2018-06-01 DIAGNOSIS — I10 BENIGN ESSENTIAL HYPERTENSION: ICD-10-CM

## 2018-06-01 LAB
ALBUMIN SERPL-MCNC: 4 G/DL (ref 3.4–5)
ALP SERPL-CCNC: 57 U/L (ref 40–150)
ALT SERPL W P-5'-P-CCNC: 48 U/L (ref 0–50)
ANION GAP SERPL CALCULATED.3IONS-SCNC: 8 MMOL/L (ref 3–14)
AST SERPL W P-5'-P-CCNC: 24 U/L (ref 0–45)
BILIRUB SERPL-MCNC: 0.8 MG/DL (ref 0.2–1.3)
BUN SERPL-MCNC: 7 MG/DL (ref 7–30)
CALCIUM SERPL-MCNC: 9.3 MG/DL (ref 8.5–10.1)
CHLORIDE SERPL-SCNC: 106 MMOL/L (ref 94–109)
CHOLEST SERPL-MCNC: 179 MG/DL
CO2 SERPL-SCNC: 27 MMOL/L (ref 20–32)
CREAT SERPL-MCNC: 0.68 MG/DL (ref 0.52–1.04)
ERYTHROCYTE [DISTWIDTH] IN BLOOD BY AUTOMATED COUNT: 14 % (ref 10–15)
GFR SERPL CREATININE-BSD FRML MDRD: 89 ML/MIN/1.7M2
GLUCOSE SERPL-MCNC: 100 MG/DL (ref 70–99)
HCT VFR BLD AUTO: 38.8 % (ref 35–47)
HDLC SERPL-MCNC: 54 MG/DL
HGB BLD-MCNC: 12.6 G/DL (ref 11.7–15.7)
LDLC SERPL CALC-MCNC: 100 MG/DL
MCH RBC QN AUTO: 28.6 PG (ref 26.5–33)
MCHC RBC AUTO-ENTMCNC: 32.5 G/DL (ref 31.5–36.5)
MCV RBC AUTO: 88 FL (ref 78–100)
NONHDLC SERPL-MCNC: 125 MG/DL
PLATELET # BLD AUTO: 310 10E9/L (ref 150–450)
POTASSIUM SERPL-SCNC: 4.1 MMOL/L (ref 3.4–5.3)
PROT SERPL-MCNC: 7.6 G/DL (ref 6.8–8.8)
RBC # BLD AUTO: 4.41 10E12/L (ref 3.8–5.2)
SODIUM SERPL-SCNC: 141 MMOL/L (ref 133–144)
TRIGL SERPL-MCNC: 126 MG/DL
WBC # BLD AUTO: 4.6 10E9/L (ref 4–11)

## 2018-06-01 PROCEDURE — 85027 COMPLETE CBC AUTOMATED: CPT | Performed by: FAMILY MEDICINE

## 2018-06-01 PROCEDURE — 80061 LIPID PANEL: CPT | Performed by: FAMILY MEDICINE

## 2018-06-01 PROCEDURE — 80053 COMPREHEN METABOLIC PANEL: CPT | Performed by: FAMILY MEDICINE

## 2018-06-01 PROCEDURE — 99396 PREV VISIT EST AGE 40-64: CPT | Performed by: FAMILY MEDICINE

## 2018-06-01 PROCEDURE — 36415 COLL VENOUS BLD VENIPUNCTURE: CPT | Performed by: FAMILY MEDICINE

## 2018-06-01 RX ORDER — CARVEDILOL 25 MG/1
12.5 TABLET ORAL 2 TIMES DAILY WITH MEALS
Qty: 180 TABLET | Refills: 1
Start: 2018-06-01 | End: 2018-08-28

## 2018-06-01 NOTE — PROGRESS NOTES
SUBJECTIVE:   CC: Theresa M Delosreyes is an 57 year old woman who presents for preventive health visit.     Healthy Habits:    Do you get at least three servings of calcium containing foods daily (dairy, green leafy vegetables, etc.)? no, taking calcium and/or vitamin D supplement: yes     Amount of exercise or daily activities, outside of work: 6-7 day(s) per week    Problems taking medications regularly No    Medication side effects: Yes, weight gain     Have you had an eye exam in the past two years? no    Do you see a dentist twice per year? yes    Do you have sleep apnea, excessive snoring or daytime drowsiness?yes      Body Aches         Description (location/character/radiation): muscle a body aches        Accompanying signs and symptoms: left foot swelling          Today's PHQ-2 Score:   PHQ-2 ( 1999 Pfizer) 6/1/2018 11/29/2017   Q1: Little interest or pleasure in doing things 0 0   Q2: Feeling down, depressed or hopeless 0 0   PHQ-2 Score 0 0   Q1: Little interest or pleasure in doing things - -   Q2: Feeling down, depressed or hopeless - -   PHQ-2 Score - -       Abuse: Current or Past(Physical, Sexual or Emotional)- No  Do you feel safe in your environment - Yes    Social History   Substance Use Topics     Smoking status: Never Smoker     Smokeless tobacco: Never Used     Alcohol use No     If you drink alcohol do you typically have >3 drinks per day or >7 drinks per week? No                     Reviewed orders with patient.  Reviewed health maintenance and updated orders accordingly - Yes  BP Readings from Last 3 Encounters:   06/01/18 124/80   03/21/18 138/86   11/29/17 142/82    Wt Readings from Last 3 Encounters:   06/01/18 200 lb (90.7 kg)   03/21/18 200 lb (90.7 kg)   11/10/17 201 lb (91.2 kg)                  Patient Active Problem List   Diagnosis     Female stress incontinence     Status post laparoscopic cholecystectomy     Osteoarthritis of both knees     Obesity (BMI 30-39.9)     BRIT  "(obstructive sleep apnea)     Hyperlipidemia LDL goal <130     Hypertension goal BP (blood pressure) < 140/90     Chest pain     Infiltrating ductal carcinoma of right female breast (H)     Antineoplastic antibiotics causing adverse effect in therapeutic use     Breast cancer (H)     Benign essential hypertension     Drugs and medicinal substances causing adverse effect in therapeutic use, initial encounter     Past Surgical History:   Procedure Laterality Date     COLONOSCOPY  5/2006    ormal to lt transverse colon, incomplete due to \"extremely redundant colon\"\"     D & C  9/2004     EXTRACT LENS CLEAR, EXCHANGE LENS REFRACTIVE, COMBINED  3/2013    bilateral     GRAFT FAT TO BREAST Bilateral 11/18/2016    Procedure: GRAFT FAT TO BREAST;  Surgeon: Alhaji Patton MD;  [facility]     GRAFT FREE VASCULARIZED TRANSVERSE RECTUS ABDOMINIS MYOCUTANEOUS Bilateral 4/12/2016    Procedure: GRAFT FREE VASCULARIZED TRANSVERSE RECTUS ABDOMINIS MYOCUTANEOUS;  Surgeon: Alhaji Patton MD;  Location:  OR     HC ABLATION, ENDOMETRIAL, THERMAL, W/O HYSTEROSCOPIC GUIDANCE  11/2004    NovaSure endometrial ablation.      HC COLON AIR CONTRAST  5/2006    normal     HC SLING OPERATION FOR STRESS INCONTINENCE  1/2007    Monarc sling procedure     INSERT PORT VASCULAR ACCESS Left 11/9/2015    Procedure: INSERT PORT VASCULAR ACCESS;  Surgeon: August Rdz MD;  [facility]     INSERT TISSUE EXPANDER BREAST BILATERAL Bilateral 9/29/2015    Procedure: INSERT TISSUE EXPANDER BREAST BILATERAL;  Surgeon: Alhaji Patton MD;  Location:  OR     LAPAROSCOPIC CHOLECYSTECTOMY WITH CHOLANGIOGRAMS  1/2005    Laparoscopic cholecystectomy with intraoperative  cholangiogram     MASTECTOMY SIMPLE BILATERAL, SENTINEL NODE BILATERAL, COMBINED Bilateral 9/29/2015    Procedure: COMBINED MASTECTOMY SIMPLE BILATERAL, SENTINEL NODE BILATERAL;  Surgeon: August Rdz MD;  Location:  OR     RECONSTRUCT BREAST " BILATERAL Bilateral 11/18/2016    Procedure: RECONSTRUCT BREAST BILATERAL;  Surgeon: Alhaji Patton MD;  [facility]     REMOVE PORT VASCULAR ACCESS N/A 11/18/2016    Procedure: REMOVE PORT VASCULAR ACCESS;  Surgeon: Alhaji Patton MD;  [facility]     REMOVE TISSUE EXPANDER BREAST Bilateral 4/12/2016    Procedure: REMOVE TISSUE EXPANDER BREAST;  Surgeon: Alhaji Patton MD;  Location:  OR     REVISE SCAR TRUNK N/A 11/18/2016    Procedure: REVISE SCAR TRUNK;  Surgeon: Alhaji Patton MD;  [facility]  [facility]     TONSILLECTOMY  2001     VASCULAR SURGERY      insert port and removed       Social History   Substance Use Topics     Smoking status: Never Smoker     Smokeless tobacco: Never Used     Alcohol use No     Family History   Problem Relation Age of Onset     Asthma Daughter      C.A.D. Father 40     MI     CEREBROVASCULAR DISEASE Father      Hypertension Mother      Breast Cancer Mother      Thyroid Disease Mother      hypothyroidism     Sleep Apnea Mother      Breast Cancer Sister      Hypertension Sister      Hypertension Brother      HEART DISEASE Brother      pacemaker     Sleep Apnea Brother      Hypertension Brother      DIABETES Brother      type 2     Sleep Apnea Brother      Myocardial Infarction Maternal Grandmother      DIABETES Paternal Grandmother      Unknown/Adopted Maternal Grandfather      Unknown/Adopted Paternal Grandfather      DIABETES Maternal Aunt      Cancer - colorectal Maternal Uncle      DIABETES Maternal Aunt      with retinopathy leading to blindness         Current Outpatient Prescriptions   Medication Sig Dispense Refill     candesartan cilexetil 32 MG TABS Take 32 mg by mouth daily 90 tablet 1     carvedilol (COREG) 25 MG tablet Take 0.5 tablets (12.5 mg) by mouth 2 times daily (with meals) 180 tablet 1     Cyanocobalamin (VITAMIN B12 PO) Take by mouth daily       denosumab (PROLIA) 60 MG/ML SOLN injection Inject 60 mg Subcutaneous  every 6 months       letrozole (FEMARA) 2.5 MG tablet Take 2.5 mg by mouth At Bedtime   0     VITAMIN D, CHOLECALCIFEROL, PO Take 3,000 Units by mouth daily       [DISCONTINUED] carvedilol (COREG) 25 MG tablet Take 1 tablet (25 mg) by mouth 2 times daily (with meals) 180 tablet 1     Allergies   Allergen Reactions     Definity Other (See Comments)     A substance that helps with imaging the heart muscle with echocardiology    Full body ache starting in the low back.      Keflex [Cephalexin]      Hand swelling, blisters???     Recent Labs   Lab Test  07/03/17   0824  02/15/17   1552   04/03/15   0717  09/11/14   1656  08/08/14   0729  03/04/13   0953   10/28/11   0726   LDL  110*   --    --   84   --   112  138*   --   126   HDL  65   --    --   62   --   57  54   --   56   TRIG  146   --    --   119   --   162*  133   --   115   ALT  29   --    --    --    --    --   30   --   13   CR  0.62  0.80   < >  0.64   --   0.82  0.60   --   0.69   GFRESTIMATED  >90  Non  GFR Calc    74   < >  >90  Non  GFR Calc     --   73  >90   --   >90   GFRESTBLACK  >90   GFR Calc    >90   < >  >90   GFR Calc     --   89  >90   --   >90   POTASSIUM  4.0  4.3   < >  3.6   --   3.9  4.2   --   4.1   TSH   --    --    --   2.34  2.42   --   1.98   < >   --     < > = values in this interval not displayed.        Mammogram not appropriate for this patient based on the previous mastectomy history .    Pertinent mammograms are reviewed under the imaging tab.  History of abnormal Pap smear: NO - age 30- 65 PAP every 3 years recommended    Reviewed and updated as needed this visit by clinical staff  Allergies         Reviewed and updated as needed this visit by Provider        Past Medical History:   Diagnosis Date     Benign hypertension     started medication 3/13     Cancer (H)     breast cancer     Chest pain     side effect of Femera     Female stress incontinence 12/18/2006  "   s/p sling procedure     Mixed hyperlipidemia      Motion sickness      Obesity (BMI 30-39.9)      BRIT (obstructive sleep apnea)     not using CPAP, treated with tonsilliectomy     Osteoarthritis of both knees      PONV (postoperative nausea and vomiting)      Status post laparoscopic cholecystectomy 2005    Cholelithiasis with mild subacute hemorrhagic cholecystitis     Supervision of other normal pregnancy      - vaginal     Uncomplicated asthma     with respiratory infections      Past Surgical History:   Procedure Laterality Date     COLONOSCOPY  2006    ormal to lt transverse colon, incomplete due to \"extremely redundant colon\"\"     D & C  2004     EXTRACT LENS CLEAR, EXCHANGE LENS REFRACTIVE, COMBINED  3/2013    bilateral     GRAFT FAT TO BREAST Bilateral 2016    Procedure: GRAFT FAT TO BREAST;  Surgeon: Alhaji Patton MD;  [facility]     GRAFT FREE VASCULARIZED TRANSVERSE RECTUS ABDOMINIS MYOCUTANEOUS Bilateral 2016    Procedure: GRAFT FREE VASCULARIZED TRANSVERSE RECTUS ABDOMINIS MYOCUTANEOUS;  Surgeon: Alhaji Patton MD;  Location[facility]     HC ABLATION, ENDOMETRIAL, THERMAL, W/O HYSTEROSCOPIC GUIDANCE  2004    NovaSure endometrial ablation.      HC COLON AIR CONTRAST  2006    normal     HC SLING OPERATION FOR STRESS INCONTINENCE  2007    Monarc sling procedure     INSERT PORT VASCULAR ACCESS Left 2015    Procedure: INSERT PORT VASCULAR ACCESS;  Surgeon: August Rdz MD;  [facility]     INSERT TISSUE EXPANDER BREAST BILATERAL Bilateral 2015    Procedure: INSERT TISSUE EXPANDER BREAST BILATERAL;  Surgeon: Alhaji Patton MD;  Location:  OR     LAPAROSCOPIC CHOLECYSTECTOMY WITH CHOLANGIOGRAMS  2005    Laparoscopic cholecystectomy with intraoperative  cholangiogram     MASTECTOMY SIMPLE BILATERAL, SENTINEL NODE BILATERAL, COMBINED Bilateral 2015    Procedure: COMBINED MASTECTOMY SIMPLE BILATERAL, " "SENTINEL NODE BILATERAL;  Surgeon: August Rdz MD;  Location:  OR     RECONSTRUCT BREAST BILATERAL Bilateral 11/18/2016    Procedure: RECONSTRUCT BREAST BILATERAL;  Surgeon: Alhaji Patton MD;  Location: [facility] [place]     REMOVE PORT VASCULAR ACCESS N/A 11/18/2016    Procedure: REMOVE PORT VASCULAR ACCESS;  Surgeon: Alhaji Patton MD;  [facility]     REMOVE TISSUE EXPANDER BREAST Bilateral 4/12/2016    Procedure: REMOVE TISSUE EXPANDER BREAST;  Surgeon: Alhaji Patton MD;  Location:  OR     REVISE SCAR TRUNK N/A 11/18/2016    Procedure: REVISE SCAR TRUNK;  Surgeon: Alhaji Patton MD;  Location: [facility] [place]     TONSILLECTOMY  2001     VASCULAR SURGERY      insert port and removed       ROS:  CONSTITUTIONAL: NEGATIVE for fever, chills, change in weight  INTEGUMENTARU/SKIN: NEGATIVE for worrisome rashes, moles or lesions  EYES: NEGATIVE for vision changes or irritation  ENT: NEGATIVE for ear, mouth and throat problems  RESP: NEGATIVE for significant cough or SOB  BREAST: NEGATIVE for masses, tenderness or discharge  CV: NEGATIVE for chest pain, palpitations or peripheral edema  GI: NEGATIVE for nausea, abdominal pain, heartburn, or change in bowel habits  : NEGATIVE for unusual urinary or vaginal symptoms. Periods are regular.  MUSCULOSKELETAL: NEGATIVE for significant arthralgias or myalgia  NEURO: NEGATIVE for weakness, dizziness or paresthesias  PSYCHIATRIC: NEGATIVE for changes in mood or affect    OBJECTIVE:   /80 (Cuff Size: Adult Regular)  Pulse 68  Temp 99.3  F (37.4  C) (Tympanic)  Resp 16  Ht 5' 3\" (1.6 m)  Wt 200 lb (90.7 kg)  LMP 02/23/2013  SpO2 100%  BMI 35.43 kg/m2  EXAM:  GENERAL: healthy, alert and no distress  EYES: Eyes grossly normal to inspection, PERRL and conjunctivae and sclerae normal  HENT: ear canals and TM's normal, nose and mouth without ulcers or lesions  NECK: no adenopathy, no asymmetry, masses, or scars and thyroid normal to " "palpation  RESP: lungs clear to auscultation - no rales, rhonchi or wheezes  CV: regular rate and rhythm, normal S1 S2, no S3 or S4, no murmur, click or rub, no peripheral edema and peripheral pulses strong  ABDOMEN: soft, nontender, no hepatosplenomegaly, no masses and bowel sounds normal  MS: no gross musculoskeletal defects noted, no edema  SKIN: no suspicious lesions or rashes  NEURO: Normal strength and tone, mentation intact and speech normal  BACK: no CVA tenderness, no paralumbar tenderness  PSYCH: mentation appears normal, affect normal/bright  LYMPH: no cervical, supraclavicular, axillary, or inguinal adenopathy    ASSESSMENT/PLAN:       ICD-10-CM    1. Healthcare maintenance Z00.00 CBC with platelets     Comprehensive metabolic panel     Lipid panel reflex to direct LDL Fasting   2. Benign essential hypertension I10 carvedilol (COREG) 25 MG tablet       COUNSELING:   Reviewed preventive health counseling, as reflected in patient instructions         reports that she has never smoked. She has never used smokeless tobacco.    Estimated body mass index is 35.43 kg/(m^2) as calculated from the following:    Height as of this encounter: 5' 3\" (1.6 m).    Weight as of this encounter: 200 lb (90.7 kg).       Counseling Resources:  ATP IV Guidelines  Pooled Cohorts Equation Calculator  Breast Cancer Risk Calculator  FRAX Risk Assessment  ICSI Preventive Guidelines  Dietary Guidelines for Americans, 2010  USDA's MyPlate  ASA Prophylaxis  Lung CA Screening    Dilan Soto MD  [facility] [place]  "

## 2018-07-27 DIAGNOSIS — I10 BENIGN ESSENTIAL HYPERTENSION: ICD-10-CM

## 2018-07-27 RX ORDER — CANDESARTAN 32 MG/1
32 TABLET ORAL DAILY
Qty: 90 TABLET | Refills: 0 | Status: SHIPPED | OUTPATIENT
Start: 2018-07-27 | End: 2018-10-26

## 2018-08-20 DIAGNOSIS — I10 BENIGN ESSENTIAL HYPERTENSION: ICD-10-CM

## 2018-08-20 NOTE — TELEPHONE ENCOUNTER
Yes, her HR and BP were appropriate at the visit, and it is hard to tell why the dose was decreased.  Can we call and ask the patient if she went down to 12.5mg BID or she is still taking 25mg BID?  Also, if the dose was decreased she should have a BP recheck.      Britni Grimes MD

## 2018-08-20 NOTE — TELEPHONE ENCOUNTER
"Requested Prescriptions   Pending Prescriptions Disp Refills     carvedilol (COREG) 25 MG tablet [Pharmacy Med Name: CARVEDILOL 25 MG TABLET]  Last Written Prescription Date:  6/1/18  Last Fill Quantity: 180,  # refills: 1   Last office visit: 6/1/2018 with prescribing provider:  Charles   Future Office Visit:     360 tablet 0     Sig: TAKE 1 TABLET BY MOUTH TWICE A DAY WITH MEAL    Beta-Blockers Protocol Passed    8/20/2018  1:20 PM       Passed - Blood pressure under 140/90 in past 12 months    BP Readings from Last 3 Encounters:   06/01/18 124/80   03/21/18 138/86   11/29/17 142/82                Passed - Patient is age 6 or older       Passed - Recent (12 mo) or future (30 days) visit within the authorizing provider's specialty    Patient had office visit in the last 12 months or has a visit in the next 30 days with authorizing provider or within the authorizing provider's specialty.  See \"Patient Info\" tab in inbasket, or \"Choose Columns\" in Meds & Orders section of the refill encounter.              "

## 2018-08-20 NOTE — TELEPHONE ENCOUNTER
Routing refill request to provider for review/approval because:  PCP please review refill request. Pharmacy is ordering a different strength than what has been most recently updated in chart. Looks like PCP cut dose in half at LOV but there is no note about it in the encounter. Please advise.   Odilia Yeh RN   [facility] - Triage

## 2018-08-21 RX ORDER — CARVEDILOL 25 MG/1
TABLET ORAL
Qty: 360 TABLET | Refills: 0 | OUTPATIENT
Start: 2018-08-21

## 2018-08-21 NOTE — TELEPHONE ENCOUNTER
patient states that this was decreased with - patient states they are decreasing this as she is trying to get off this medication-   she will send the BP results via [employer] as she normally does with Dr. Soto  States that she is still in the normal range for her blood pressures and does not need a refill for a least 6 months    Maria C Zayas RN BSN  [facility]  [phone]

## 2018-08-28 DIAGNOSIS — I10 BENIGN ESSENTIAL HYPERTENSION: ICD-10-CM

## 2018-08-28 RX ORDER — CARVEDILOL 25 MG/1
TABLET ORAL
Qty: 180 TABLET | Refills: 3 | Status: SHIPPED | OUTPATIENT
Start: 2018-08-28 | End: 2018-11-30

## 2018-10-04 ENCOUNTER — OFFICE VISIT (OUTPATIENT)
Dept: FAMILY MEDICINE | Facility: CLINIC | Age: 58
End: 2018-10-04
Payer: COMMERCIAL

## 2018-10-04 VITALS
WEIGHT: 200 LBS | TEMPERATURE: 98.4 F | HEART RATE: 76 BPM | DIASTOLIC BLOOD PRESSURE: 81 MMHG | OXYGEN SATURATION: 99 % | SYSTOLIC BLOOD PRESSURE: 127 MMHG | RESPIRATION RATE: 14 BRPM | HEIGHT: 63 IN | BODY MASS INDEX: 35.44 KG/M2

## 2018-10-04 DIAGNOSIS — E66.01 MORBID OBESITY (H): ICD-10-CM

## 2018-10-04 DIAGNOSIS — B35.1 ONYCHOMYCOSIS: Primary | ICD-10-CM

## 2018-10-04 PROCEDURE — 36415 COLL VENOUS BLD VENIPUNCTURE: CPT | Performed by: FAMILY MEDICINE

## 2018-10-04 PROCEDURE — 80076 HEPATIC FUNCTION PANEL: CPT | Performed by: FAMILY MEDICINE

## 2018-10-04 PROCEDURE — 99213 OFFICE O/P EST LOW 20 MIN: CPT | Performed by: FAMILY MEDICINE

## 2018-10-04 NOTE — MR AVS SNAPSHOT
"              After Visit Summary   10/4/2018    Theresa M Delosreyes    MRN: [id number]           Patient Information     Date Of Birth          1960        Visit Information        Provider Department      10/4/2018 6:20 PM Dilan Soto MD [facility] [place]        Today's Diagnoses     Onychomycosis    -  1    Morbid obesity (H)           Follow-ups after your visit        Follow-up notes from your care team     Return in about 1 month (around 11/4/2018).      Who to contact     If you have questions or need follow up information about today's clinic visit or your schedule please contact [facility] directly at [phone].  Normal or non-critical lab and imaging results will be communicated to you by MyChart, letter or phone within 4 business days after the clinic has received the results. If you do not hear from us within 7 days, please contact the clinic through [employer] Inchart or phone. If you have a critical or abnormal lab result, we will notify you by phone as soon as possible.  Submit refill requests through [employer] or call your pharmacy and they will forward the refill request to us. Please allow 3 business days for your refill to be completed.          Additional Information About Your Visit        MyChart Information     [employer] gives you secure access to your electronic health record. If you see a primary care provider, you can also send messages to your care team and make appointments. If you have questions, please call your primary care clinic.  If you do not have a primary care provider, please call [phone] and they will assist you.        Care EveryWhere ID     This is your Care EveryWhere ID. This could be used by other organizations to access your [place] medical records  [id number]        Your Vitals Were     Pulse Temperature Respirations Height Last Period Pulse Oximetry    76 98.4  F (36.9  C) (Tympanic) 14 5' 3\" (1.6 m) 02/23/2013 99%    BMI (Body Mass " Index)                   35.43 kg/m2            Blood Pressure from Last 3 Encounters:   10/04/18 127/81   06/01/18 124/80   03/21/18 138/86    Weight from Last 3 Encounters:   10/04/18 200 lb (90.7 kg)   06/01/18 200 lb (90.7 kg)   03/21/18 200 lb (90.7 kg)              We Performed the Following     Hepatic panel (Albumin, ALT, AST, Bili, Alk Phos, TP)          Today's Medication Changes          These changes are accurate as of 10/4/18 11:59 PM.  If you have any questions, ask your nurse or doctor.               Start taking these medicines.        Dose/Directions    terbinafine 250 MG tablet   Commonly known as:  lamISIL   Used for:  Onychomycosis        Dose:  250 mg   Take 1 tablet (250 mg) by mouth daily   Quantity:  30 tablet   Refills:  2            Where to get your medicines      These medications were sent to [facility]/pharmacy #[id number] - [facility] [address] [address]     Phone:  [phone]     terbinafine 250 MG tablet                Primary Care Provider Office Phone # Fax #    Dilan Soto [phone]       [address] [facility] [address]        Equal Access to Services     LYDIA WALKER AH: Hadii becka nicholas hadasho Soomaali, waaxda luqadaha, qaybta kaalmada adeegyada, waxdejuan rodríguezin josep ruano. So [facility] [phone].    ATENCIÓN: Si habla español, tiene a gil disposición servicios gratuitos de asistencia lingüística. ame al [phone].    We comply with applicable federal civil rights laws and [place] laws. We do not discriminate on the basis of race, color, national origin, age, disability, sex, sexual orientation, or gender identity.            Thank you!     Thank you for choosing [facility]  for your care. Our goal is always to provide you with excellent care. Hearing back from our patients is one way we can continue to improve our services. Please take a few minutes to complete the  written survey that you may receive in the mail after your visit with us. Thank you!             Your Updated Medication List - Protect others around you: Learn how to safely use, store and throw away your medicines at www.disposemymeds.org.          This list is accurate as of 10/4/18 11:59 PM.  Always use your most recent med list.                   Brand Name Dispense Instructions for use Diagnosis    candesartan cilexetil 32 MG Tabs     90 tablet    Take 32 mg by mouth daily    Benign essential hypertension       carvedilol 25 MG tablet    COREG    180 tablet    Take one tablet (25 mg) by mouth twice a day with meals    Benign essential hypertension       denosumab 60 MG/ML Soln injection    PROLIA     Inject 60 mg Subcutaneous every 6 months        letrozole 2.5 MG tablet    FEMARA     Take 2.5 mg by mouth At Bedtime        terbinafine 250 MG tablet    lamISIL    30 tablet    Take 1 tablet (250 mg) by mouth daily    Onychomycosis       VITAMIN B12 PO      Take by mouth daily        VITAMIN D (CHOLECALCIFEROL) PO      Take 3,000 Units by mouth daily

## 2018-10-04 NOTE — PROGRESS NOTES
"  SUBJECTIVE:   Theresa M Delosreyes is a 58 year old female who presents to clinic today for the following health issues:      Musculoskeletal problem/pain      Duration: about 6 months     Description  Location: both big toes     Intensity:  moderate    Accompanying signs and symptoms: discoloration of toenails     History  Previous similar problem: no   Previous evaluation:  none    Precipitating or alleviating factors:  Trauma or overuse: no   Aggravating factors include: applying pressure     Therapies tried and outcome: Advil, Tylenol     Problem list and histories reviewed & adjusted, as indicated.  Additional history: as documented    Patient Active Problem List   Diagnosis     Female stress incontinence     Status post laparoscopic cholecystectomy     Osteoarthritis of both knees     Obesity (BMI 30-39.9)     BRIT (obstructive sleep apnea)     Hyperlipidemia LDL goal <130     Hypertension goal BP (blood pressure) < 140/90     Chest pain     Infiltrating ductal carcinoma of right female breast (H)     Antineoplastic antibiotics causing adverse effect in therapeutic use     Breast cancer (H)     Benign essential hypertension     Drugs and medicinal substances causing adverse effect in therapeutic use, initial encounter     Obesity (BMI 35.0-39.9) with comorbidity (H)     Past Surgical History:   Procedure Laterality Date     COLONOSCOPY  5/2006    ormal to lt transverse colon, incomplete due to \"extremely redundant colon\"\"     D & C  9/2004     EXTRACT LENS CLEAR, EXCHANGE LENS REFRACTIVE, COMBINED  3/2013    bilateral     GRAFT FAT TO BREAST Bilateral 11/18/2016    Procedure: GRAFT FAT TO BREAST;  Surgeon: Alhaji Patton MD;  [facility]     GRAFT FREE VASCULARIZED TRANSVERSE RECTUS ABDOMINIS MYOCUTANEOUS Bilateral 4/12/2016    Procedure: GRAFT FREE VASCULARIZED TRANSVERSE RECTUS ABDOMINIS MYOCUTANEOUS;  Surgeon: Alhaji Patton MD;  Location: [facility]     HC ABLATION, ENDOMETRIAL, " THERMAL, W/O HYSTEROSCOPIC GUIDANCE  11/2004    NovaSure endometrial ablation.      HC COLON AIR CONTRAST  5/2006    normal     HC SLING OPERATION FOR STRESS INCONTINENCE  1/2007    [facility] sling procedure     INSERT PORT VASCULAR ACCESS Left 11/9/2015    Procedure: INSERT PORT VASCULAR ACCESS;  Surgeon: August Rdz MD;  Location: [facility]     INSERT TISSUE EXPANDER BREAST BILATERAL Bilateral 9/29/2015    Procedure: INSERT TISSUE EXPANDER BREAST BILATERAL;  Surgeon: Alhaji Patton MD;  Location:  OR     LAPAROSCOPIC CHOLECYSTECTOMY WITH CHOLANGIOGRAMS  1/2005    Laparoscopic cholecystectomy with intraoperative  cholangiogram     MASTECTOMY SIMPLE BILATERAL, SENTINEL NODE BILATERAL, COMBINED Bilateral 9/29/2015    Procedure: COMBINED MASTECTOMY SIMPLE BILATERAL, SENTINEL NODE BILATERAL;  Surgeon: August Rdz MD;  Location:  OR     RECONSTRUCT BREAST BILATERAL Bilateral 11/18/2016    Procedure: RECONSTRUCT BREAST BILATERAL;  Surgeon: Alhaji Patton MD;  [facility]     REMOVE PORT VASCULAR ACCESS N/A 11/18/2016    Procedure: REMOVE PORT VASCULAR ACCESS;  Surgeon: Alhaji Patton MD;  [facility]     REMOVE TISSUE EXPANDER BREAST Bilateral 4/12/2016    Procedure: REMOVE TISSUE EXPANDER BREAST;  Surgeon: Alhaji Patton MD;  Location:  OR     REVISE SCAR TRUNK N/A 11/18/2016    Procedure: REVISE SCAR TRUNK;  Surgeon: Alhaji Patton MD;  [facility]     TONSILLECTOMY  2001     VASCULAR SURGERY      insert port and removed       Social History   Substance Use Topics     Smoking status: Never Smoker     Smokeless tobacco: Never Used     Alcohol use No     Family History   Problem Relation Age of Onset     Asthma Daughter      C.A.D. Father 40     MI     Cerebrovascular Disease Father      Hypertension Mother      Breast Cancer Mother      Thyroid Disease Mother      hypothyroidism     Sleep Apnea Mother      Breast Cancer Sister       Hypertension Sister      Hypertension Brother      HEART DISEASE Brother      pacemaker     Sleep Apnea Brother      Hypertension Brother      Diabetes Brother      type 2     Sleep Apnea Brother      Myocardial Infarction Maternal Grandmother      Diabetes Paternal Grandmother      Unknown/Adopted Maternal Grandfather      Unknown/Adopted Paternal Grandfather      Diabetes Maternal Aunt      Cancer - colorectal Maternal Uncle      Diabetes Maternal Aunt      with retinopathy leading to blindness         Current Outpatient Prescriptions   Medication Sig Dispense Refill     candesartan cilexetil 32 MG TABS Take 32 mg by mouth daily 90 tablet 0     carvedilol (COREG) 25 MG tablet Take one tablet (25 mg) by mouth twice a day with meals 180 tablet 3     Cyanocobalamin (VITAMIN B12 PO) Take by mouth daily       denosumab (PROLIA) 60 MG/ML SOLN injection Inject 60 mg Subcutaneous every 6 months       letrozole (FEMARA) 2.5 MG tablet Take 2.5 mg by mouth At Bedtime   0     VITAMIN D, CHOLECALCIFEROL, PO Take 3,000 Units by mouth daily       Allergies   Allergen Reactions     Definity Other (See Comments)     A substance that helps with imaging the heart muscle with echocardiology    Full body ache starting in the low back.      Keflex [Cephalexin]      Hand swelling, blisters???     Recent Labs   Lab Test  06/01/18   0953  07/03/17   0824   04/03/15   0717  09/11/14   1656   03/04/13   0953   LDL  100*  110*   --   84   --    < >  138*   HDL  54  65   --   62   --    < >  54   TRIG  126  146   --   119   --    < >  133   ALT  48  29   --    --    --    --   30   CR  0.68  0.62   < >  0.64   --    < >  0.60   GFRESTIMATED  89  >90  Non  GFR Calc     < >  >90  Non  GFR Calc     --    < >  >90   GFRESTBLACK  >90  >90  African American GFR Calc     < >  >90   GFR Calc     --    < >  >90   POTASSIUM  4.1  4.0   < >  3.6   --    < >  4.2   TSH   --    --    --   2.34  2.42   --    "1.98    < > = values in this interval not displayed.      BP Readings from Last 3 Encounters:   10/04/18 127/81   06/01/18 124/80   03/21/18 138/86    Wt Readings from Last 3 Encounters:   10/04/18 200 lb (90.7 kg)   06/01/18 200 lb (90.7 kg)   03/21/18 200 lb (90.7 kg)                    Reviewed and updated as needed this visit by clinical staff       Reviewed and updated as needed this visit by Provider         ROS:  Constitutional, HEENT, cardiovascular, pulmonary, gi and gu systems are negative, except as otherwise noted.    OBJECTIVE:     /81 (Cuff Size: Adult Regular)  Pulse 76  Temp 98.4  F (36.9  C) (Tympanic)  Resp 14  Ht 5' 3\" (1.6 m)  Wt 200 lb (90.7 kg)  LMP 02/23/2013  SpO2 99%  BMI 35.43 kg/m2  Body mass index is 35.43 kg/(m^2).  GENERAL: healthy, alert and no distress  NECK: no adenopathy, no asymmetry, masses, or scars and thyroid normal to palpation  RESP: lungs clear to auscultation - no rales, rhonchi or wheezes  CV: regular rate and rhythm, normal S1 S2, no S3 or S4, no murmur, click or rub, no peripheral edema and peripheral pulses strong  ABDOMEN: soft, nontender, no hepatosplenomegaly, no masses and bowel sounds normal  MS: no gross musculoskeletal defects noted, no edema        ASSESSMENT/PLAN:   Melina was seen today for musculoskeletal problem.    Diagnoses and all orders for this visit:    Onychomycosis  -     Hepatic panel (Albumin, ALT, AST, Bili, Alk Phos, TP)    Morbid obesity (H)      Will have her to check LFT and start lamisil for next 1 month, and recheck to see if needed toenail removal for nail deformity from onychomycosis         Dilan Soto MD  [facility] [place]    "

## 2018-10-05 LAB
ALBUMIN SERPL-MCNC: 3.8 G/DL (ref 3.4–5)
ALP SERPL-CCNC: 73 U/L (ref 40–150)
ALT SERPL W P-5'-P-CCNC: 32 U/L (ref 0–50)
AST SERPL W P-5'-P-CCNC: 15 U/L (ref 0–45)
BILIRUB DIRECT SERPL-MCNC: 0.1 MG/DL (ref 0–0.2)
BILIRUB SERPL-MCNC: 0.5 MG/DL (ref 0.2–1.3)
PROT SERPL-MCNC: 8 G/DL (ref 6.8–8.8)

## 2018-10-05 RX ORDER — TERBINAFINE HYDROCHLORIDE 250 MG/1
250 TABLET ORAL DAILY
Qty: 30 TABLET | Refills: 2 | Status: SHIPPED | OUTPATIENT
Start: 2018-10-05 | End: 2019-04-23

## 2018-10-18 ENCOUNTER — TRANSFERRED RECORDS (OUTPATIENT)
Dept: HEALTH INFORMATION MANAGEMENT | Facility: CLINIC | Age: 58
End: 2018-10-18

## 2018-10-26 DIAGNOSIS — I10 BENIGN ESSENTIAL HYPERTENSION: ICD-10-CM

## 2018-10-26 RX ORDER — CANDESARTAN 32 MG/1
32 TABLET ORAL DAILY
Qty: 90 TABLET | Refills: 1 | Status: SHIPPED | OUTPATIENT
Start: 2018-10-26 | End: 2019-04-24

## 2018-10-26 NOTE — TELEPHONE ENCOUNTER
"Requested Prescriptions   Pending Prescriptions Disp Refills     candesartan cilexetil 32 MG TABS 90 tablet 0     Sig: Take 32 mg by mouth daily  Last Written Prescription Date:  7/27/18  Last Fill Quantity: 90,  # refills: 0   Last office visit: 10/4/2018 with prescribing provider:  Charles   Future Office Visit:        Angiotensin-II Receptors Passed    10/26/2018 11:22 AM       Passed - Blood pressure under 140/90 in past 12 months    BP Readings from Last 3 Encounters:   10/04/18 127/81   06/01/18 124/80   03/21/18 138/86                Passed - Recent (12 mo) or future (30 days) visit within the authorizing provider's specialty    Patient had office visit in the last 12 months or has a visit in the next 30 days with authorizing provider or within the authorizing provider's specialty.  See \"Patient Info\" tab in inbasket, or \"Choose Columns\" in Meds & Orders section of the refill encounter.             Passed - Patient is age 18 or older       Passed - No active pregnancy on record       Passed - Normal serum creatinine on file in past 12 months    Recent Labs   Lab Test  06/01/18   0953   CR  0.68            Passed - Normal serum potassium on file in past 12 months    Recent Labs   Lab Test  06/01/18   0953   POTASSIUM  4.1                   Passed - No positive pregnancy test in past 12 months        Prescription approved per [facility] Refill Protocol.    Akilah NOYOLA RN  EP Triage    "

## 2018-11-30 ENCOUNTER — OFFICE VISIT (OUTPATIENT)
Dept: FAMILY MEDICINE | Facility: CLINIC | Age: 58
End: 2018-11-30
Payer: COMMERCIAL

## 2018-11-30 VITALS
RESPIRATION RATE: 14 BRPM | WEIGHT: 198 LBS | DIASTOLIC BLOOD PRESSURE: 78 MMHG | SYSTOLIC BLOOD PRESSURE: 134 MMHG | TEMPERATURE: 98.9 F | OXYGEN SATURATION: 99 % | HEIGHT: 63 IN | BODY MASS INDEX: 35.08 KG/M2 | HEART RATE: 72 BPM

## 2018-11-30 DIAGNOSIS — I10 HYPERTENSION GOAL BP (BLOOD PRESSURE) < 140/90: ICD-10-CM

## 2018-11-30 DIAGNOSIS — E66.9 OBESITY (BMI 30-39.9): ICD-10-CM

## 2018-11-30 DIAGNOSIS — Z79.811 USE OF LETROZOLE (FEMARA): ICD-10-CM

## 2018-11-30 DIAGNOSIS — Z01.818 PREOP GENERAL PHYSICAL EXAM: Primary | ICD-10-CM

## 2018-11-30 DIAGNOSIS — I10 BENIGN ESSENTIAL HYPERTENSION: ICD-10-CM

## 2018-11-30 DIAGNOSIS — E78.5 HYPERLIPIDEMIA LDL GOAL <130: ICD-10-CM

## 2018-11-30 LAB
ANION GAP SERPL CALCULATED.3IONS-SCNC: 7 MMOL/L (ref 3–14)
BUN SERPL-MCNC: 10 MG/DL (ref 7–30)
CALCIUM SERPL-MCNC: 9.2 MG/DL (ref 8.5–10.1)
CHLORIDE SERPL-SCNC: 106 MMOL/L (ref 94–109)
CO2 SERPL-SCNC: 27 MMOL/L (ref 20–32)
CREAT SERPL-MCNC: 0.76 MG/DL (ref 0.52–1.04)
ERYTHROCYTE [DISTWIDTH] IN BLOOD BY AUTOMATED COUNT: 13.8 % (ref 10–15)
GFR SERPL CREATININE-BSD FRML MDRD: 78 ML/MIN/1.7M2
GLUCOSE SERPL-MCNC: 99 MG/DL (ref 70–99)
HCT VFR BLD AUTO: 39.8 % (ref 35–47)
HGB BLD-MCNC: 13 G/DL (ref 11.7–15.7)
MCH RBC QN AUTO: 28.8 PG (ref 26.5–33)
MCHC RBC AUTO-ENTMCNC: 32.7 G/DL (ref 31.5–36.5)
MCV RBC AUTO: 88 FL (ref 78–100)
PLATELET # BLD AUTO: 311 10E9/L (ref 150–450)
POTASSIUM SERPL-SCNC: 4 MMOL/L (ref 3.4–5.3)
RBC # BLD AUTO: 4.51 10E12/L (ref 3.8–5.2)
SODIUM SERPL-SCNC: 140 MMOL/L (ref 133–144)
WBC # BLD AUTO: 6.3 10E9/L (ref 4–11)

## 2018-11-30 PROCEDURE — 93000 ELECTROCARDIOGRAM COMPLETE: CPT | Performed by: FAMILY MEDICINE

## 2018-11-30 PROCEDURE — 85027 COMPLETE CBC AUTOMATED: CPT | Performed by: FAMILY MEDICINE

## 2018-11-30 PROCEDURE — 36415 COLL VENOUS BLD VENIPUNCTURE: CPT | Performed by: FAMILY MEDICINE

## 2018-11-30 PROCEDURE — 99215 OFFICE O/P EST HI 40 MIN: CPT | Performed by: FAMILY MEDICINE

## 2018-11-30 PROCEDURE — 80048 BASIC METABOLIC PNL TOTAL CA: CPT | Performed by: FAMILY MEDICINE

## 2018-11-30 RX ORDER — CARVEDILOL 25 MG/1
25 TABLET ORAL DAILY
Qty: 90 TABLET | Refills: 3
Start: 2018-11-30 | End: 2021-09-24

## 2018-11-30 NOTE — MR AVS SNAPSHOT
After Visit Summary   11/30/2018    Theresa M Delosreyes    MRN: [id number]           Patient Information     Date Of Birth          1960        Visit Information        Provider Department      11/30/2018 10:00 AM Dilan Soto MD [facility] [place]        Today's Diagnoses     Preop general physical exam    -  1    Obesity (BMI 30-39.9)        Hyperlipidemia LDL goal <130        Hypertension goal BP (blood pressure) < 140/90        Use of letrozole (Femara)        Benign essential hypertension          Care Instructions      Before Your Surgery      Call your surgeon if there is any change in your health. This includes signs of a cold or flu (such as a sore throat, runny nose, cough, rash or fever).    Do not smoke, drink alcohol or take over the counter medicine (unless your surgeon or primary care doctor tells you to) for the 24 hours before and after surgery.    If you take prescribed drugs: Follow your doctor s orders about which medicines to take and which to stop until after surgery.    Eating and drinking prior to surgery: follow the instructions from your surgeon    Take a shower or bath the night before surgery. Use the soap your surgeon gave you to gently clean your skin. If you do not have soap from your surgeon, use your regular soap. Do not shave or scrub the surgery site.  Wear clean pajamas and have clean sheets on your bed.           Follow-ups after your visit        Follow-up notes from your care team     Return in about 6 months (around 5/30/2019) for Physical Exam.      Your next 10 appointments already scheduled     Dec 14, 2018   Procedure with Alhaji Patton MD   [facility] (--)    [address]  [facility] [id number] [phone]              Who to contact     If you have questions or need follow up information about today's clinic visit or your schedule please contact [facility] [place] directly at  "[phone].  Normal or non-critical lab and imaging results will be communicated to you by MyChart, letter or phone within 4 business days after the clinic has received the results. If you do not hear from us within 7 days, please contact the clinic through [employer] or phone. If you have a critical or abnormal lab result, we will notify you by phone as soon as possible.  Submit refill requests through [employer] or call your pharmacy and they will forward the refill request to us. Please allow 3 business days for your refill to be completed.          Additional Information About Your Visit        [employer] Information     [employer] gives you secure access to your electronic health record. If you see a primary care provider, you can also send messages to your care team and make appointments. If you have questions, please call your primary care clinic.  If you do not have a primary care provider, please call [phone] and they will assist you.        Care EveryWhere ID     This is your Care EveryWhere ID. This could be used by other organizations to access your [facility] medical records  [id number]        Your Vitals Were     Pulse Temperature Respirations Height Last Period Pulse Oximetry    72 98.9  F (37.2  C) (Tympanic) 14 5' 3\" (1.6 m) 02/23/2013 99%    BMI (Body Mass Index)                   35.07 kg/m2            Blood Pressure from Last 3 Encounters:   11/30/18 134/78   10/04/18 127/81   06/01/18 124/80    Weight from Last 3 Encounters:   11/30/18 198 lb (89.8 kg)   10/04/18 200 lb (90.7 kg)   06/01/18 200 lb (90.7 kg)              We Performed the Following     Basic metabolic panel  (Ca, Cl, CO2, Creat, Gluc, K, Na, BUN)     CBC with platelets     EKG 12-lead complete w/read - Clinics          Today's Medication Changes          These changes are accurate as of 11/30/18 11:59 PM.  If you have any questions, ask your nurse or doctor.               These medicines have changed or have updated prescriptions.     "    Dose/Directions    carvedilol 25 MG tablet   Commonly known as:  COREG   This may have changed:    - how much to take  - how to take this  - when to take this   Used for:  Benign essential hypertension   Changed by:  Dilan Soto MD        Dose:  25 mg   Take 1 tablet (25 mg) by mouth daily Take one tablet (25 mg) by mouth twice a day with meals   Quantity:  90 tablet   Refills:  3            Where to get your medicines      Some of these will need a paper prescription and others can be bought over the counter.  Ask your nurse if you have questions.     You don't need a prescription for these medications     carvedilol 25 MG tablet                Primary Care Provider Office Phone # Fax #    Dilan Soto [phone]       [address] [place], [place] [id number]        Equal Access to Services     LYDIA WALKER : Sharonda northo Sofidelina, waaxda luqadaha, qaybta kaalmada adeyobaniyaterra, chastity cuevas . [facility] [phone].    ATENCIÓN: Si habla español, tiene a gil disposición servicios gratuitos de asistencia lingüística. [facility] [phone].    We comply with applicable federal civil rights laws and [place] laws. We do not discriminate on the basis of race, color, national origin, age, disability, sex, sexual orientation, or gender identity.            Thank you!     [place] you [place] [facility] [place]. Our goal is always to provide you with excellent care. Hearing back from our patients is one way we can continue to improve our services. Please take a few minutes to complete the written survey that you may receive in the mail after your visit with us. Thank you!             Your Updated Medication List - Protect others around you: Learn how to safely use, store and throw away your medicines at www.disposemymeds.org.          This list is accurate as of 11/30/18 11:59 PM.  Always use your most recent med list.                    Brand Name Dispense Instructions for use Diagnosis    candesartan 32 MG tablet    ATACAND    90 tablet    Take 32 mg by mouth daily    Benign essential hypertension       carvedilol 25 MG tablet    COREG    90 tablet    Take 1 tablet (25 mg) by mouth daily Take one tablet (25 mg) by mouth twice a day with meals    Benign essential hypertension       denosumab 60 MG/ML Soln injection    PROLIA     Inject 60 mg Subcutaneous every 6 months        letrozole 2.5 MG tablet    FEMARA     Take 2.5 mg by mouth At Bedtime        terbinafine 250 MG tablet    lamISIL    30 tablet    Take 1 tablet (250 mg) by mouth daily    Onychomycosis       VITAMIN B12 PO      Take by mouth daily        VITAMIN D (CHOLECALCIFEROL) PO      Take 3,000 Units by mouth daily

## 2018-11-30 NOTE — PROGRESS NOTES
[place] [id number] [facility] [address]  [phone]  Dept: [phone]    PRE-OP EVALUATION:  Today's date: 2018    Theresa M Delosreyes (: 1960) presents for pre-operative evaluation assessment as requested by Dr. Alhaji Lisa.  She requires evaluation and anesthesia risk assessment prior to undergoing surgery/procedure.    Proposed Surgery/ Procedure: Breast Surgery   Date of Surgery/ Procedure: 18  Time of Surgery/ Procedure: 7:30  Hospital/Surgical Facility: [facility]   Fax number for surgical facility: [phone]  Primary Physician: Dilan Soto  Type of Anesthesia Anticipated: to be determined    Patient has a Health Care Directive or Living Will:  YES     1. NO - Do you have a history of heart attack, stroke, stent, bypass or surgery on an artery in the head, neck, heart or legs?  2. NO - Do you ever have any pain or discomfort in your chest?  3. NO - Do you have a history of  Heart Failure?  4. NO - Are you troubled by shortness of breath when: walking on the level, up a slight hill or at night?  5. NO - Do you currently have a cold, bronchitis or other respiratory infection?  6. NO - Do you have a cough, shortness of breath or wheezing?  7. NO - Do you sometimes get pains in the calves of your legs when you walk?  8. NO - Do you or anyone in your family have previous history of blood clots?  9. NO - Do you or does anyone in your family have a serious bleeding problem such as prolonged bleeding following surgeries or cuts?  10. NO - Have you ever had problems with anemia or been told to take iron pills?  11. NO - Have you had any abnormal blood loss such as black, tarry or bloody stools, or abnormal vaginal bleeding?  12. NO - Have you ever had a blood transfusion?  13. YES - Have you or any of your relatives ever had problems with anesthesia?  14. YES - Do you have sleep apnea, excessive snoring or daytime drowsiness?  15. NO -  Do you have any prosthetic heart valves?  16. NO - Do you have prosthetic joints?  17. NO - Is there any chance that you may be pregnant?      HPI:     See problem list for active medical problems.  Problems all longstanding and stable, except as noted/documented.  See ROS for pertinent symptoms related to these conditions.                                                                                                                                                          .    MEDICAL HISTORY:     Patient Active Problem List    Diagnosis Date Noted     Hypertension goal BP (blood pressure) < 140/90 07/13/2013     Priority: High     Hyperlipidemia LDL goal <130 03/05/2013     Priority: High     Osteoarthritis of both knees      Priority: High     Obesity (BMI 30-39.9)      Priority: High     BRIT (obstructive sleep apnea)      Priority: High     not using CPAP, treated with tonsilliectomy       Female stress incontinence 12/18/2006     Priority: High     Obesity (BMI 35.0-39.9) with comorbidity (H) 10/04/2018     Priority: Medium     Benign essential hypertension 05/19/2017     Priority: Medium     Drugs and medicinal substances causing adverse effect in therapeutic use, initial encounter 05/19/2017     Priority: Medium     Breast cancer (H) 04/13/2016     Priority: Medium     Infiltrating ductal carcinoma of right female breast (H) 10/20/2015     Priority: Medium     Antineoplastic antibiotics causing adverse effect in therapeutic use 10/20/2015     Priority: Medium     Chest pain 05/01/2015     Priority: Medium     Status post laparoscopic cholecystectomy 01/01/2005     Priority: Low     Cholelithiasis with mild subacute hemorrhagic cholecystitis        Past Medical History:   Diagnosis Date     Benign hypertension     started medication 3/13     Cancer (H)     breast cancer     Chest pain     side effect of Femera     Female stress incontinence 12/18/2006    s/p sling procedure     Mixed hyperlipidemia 2013      "Motion sickness      Obesity (BMI 30-39.9)      BRIT (obstructive sleep apnea)     not using CPAP, treated with tonsilliectomy     Osteoarthritis of both knees      PONV (postoperative nausea and vomiting)      Status post laparoscopic cholecystectomy 2005    Cholelithiasis with mild subacute hemorrhagic cholecystitis     Supervision of other normal pregnancy      - vaginal     Uncomplicated asthma     with respiratory infections     Past Surgical History:   Procedure Laterality Date     COLONOSCOPY  2006    ormal to lt transverse colon, incomplete due to \"extremely redundant colon\"\"     D & C  2004     EXTRACT LENS CLEAR, EXCHANGE LENS REFRACTIVE, COMBINED  3/2013    bilateral     GRAFT FAT TO BREAST Bilateral 2016    Procedure: GRAFT FAT TO BREAST;  Surgeon: Alhaji Patton MD;  Location:  [facility]     GRAFT FREE VASCULARIZED TRANSVERSE RECTUS ABDOMINIS MYOCUTANEOUS Bilateral 2016    Procedure: GRAFT FREE VASCULARIZED TRANSVERSE RECTUS ABDOMINIS MYOCUTANEOUS;  Surgeon: Alhaji Patton MD;  Location: [facility] OR     HC ABLATION, ENDOMETRIAL, THERMAL, W/O HYSTEROSCOPIC GUIDANCE  2004    NovaSure endometrial ablation.      HC COLON AIR CONTRAST  2006    normal     HC SLING OPERATION FOR STRESS INCONTINENCE  2007    Monarc sling procedure     INSERT PORT VASCULAR ACCESS Left 2015    Procedure: INSERT PORT VASCULAR ACCESS;  Surgeon: August Rdz MD;  Location:  [facility]     INSERT TISSUE EXPANDER BREAST BILATERAL Bilateral 2015    Procedure: INSERT TISSUE EXPANDER BREAST BILATERAL;  Surgeon: Alhaji Patton MD;  Location:  OR     LAPAROSCOPIC CHOLECYSTECTOMY WITH CHOLANGIOGRAMS  2005    Laparoscopic cholecystectomy with intraoperative  cholangiogram     MASTECTOMY SIMPLE BILATERAL, SENTINEL NODE BILATERAL, COMBINED Bilateral 2015    Procedure: COMBINED MASTECTOMY SIMPLE BILATERAL, SENTINEL NODE BILATERAL;  Surgeon: August Rdz MD;  " Location:  OR     RECONSTRUCT BREAST BILATERAL Bilateral 11/18/2016    Procedure: RECONSTRUCT BREAST BILATERAL;  Surgeon: Alhaji Patton MD;  [facility]     REMOVE PORT VASCULAR ACCESS N/A 11/18/2016    Procedure: REMOVE PORT VASCULAR ACCESS;  Surgeon: Alhaji Patton MD;  [facility]     REMOVE TISSUE EXPANDER BREAST Bilateral 4/12/2016    Procedure: REMOVE TISSUE EXPANDER BREAST;  Surgeon: Alhaji Patton MD;  Location:  OR     REVISE SCAR TRUNK N/A 11/18/2016    Procedure: REVISE SCAR TRUNK;  Surgeon: Alhaji Patton MD;  [facility]:  [facility]     TONSILLECTOMY  2001     VASCULAR SURGERY      insert port and removed     Current Outpatient Prescriptions   Medication Sig Dispense Refill     candesartan cilexetil 32 MG TABS Take 32 mg by mouth daily 90 tablet 1     carvedilol (COREG) 25 MG tablet Take one tablet (25 mg) by mouth twice a day with meals 180 tablet 3     Cyanocobalamin (VITAMIN B12 PO) Take by mouth daily       denosumab (PROLIA) 60 MG/ML SOLN injection Inject 60 mg Subcutaneous every 6 months       letrozole (FEMARA) 2.5 MG tablet Take 2.5 mg by mouth At Bedtime   0     terbinafine (LAMISIL) 250 MG tablet Take 1 tablet (250 mg) by mouth daily 30 tablet 2     VITAMIN D, CHOLECALCIFEROL, PO Take 3,000 Units by mouth daily       OTC products: None, except as noted above    Allergies   Allergen Reactions     Definity Other (See Comments)     A substance that helps with imaging the heart muscle with echocardiology    Full body ache starting in the low back.      Keflex [Cephalexin]      Hand swelling, blisters???      Latex Allergy: NO    Social History   Substance Use Topics     Smoking status: Never Smoker     Smokeless tobacco: Never Used     Alcohol use No     History   Drug Use No       REVIEW OF SYSTEMS:   CONSTITUTIONAL: NEGATIVE for fever, chills, change in weight  ENT/MOUTH: NEGATIVE for ear, mouth and throat problems  RESP: NEGATIVE for significant  "cough or SOB  CV: NEGATIVE for chest pain, palpitations or peripheral edema    EXAM:   /78 (Cuff Size: Adult Regular)  Pulse 72  Temp 98.9  F (37.2  C) (Tympanic)  Resp 14  Ht 5' 3\" (1.6 m)  Wt 198 lb (89.8 kg)  LMP 02/23/2013  SpO2 99%  BMI 35.07 kg/m2  GENERAL APPEARANCE: healthy, alert and no distress  HENT: ear canals and TM's normal and nose and mouth without ulcers or lesions  RESP: lungs clear to auscultation - no rales, rhonchi or wheezes  CV: regular rate and rhythm, normal S1 S2, no S3 or S4 and no murmur, click or rub   ABDOMEN: soft, nontender, no HSM or masses and bowel sounds normal  NEURO: Normal strength and tone, sensory exam grossly normal, mentation intact and speech normal    DIAGNOSTICS:     Labs Resulted Today:   Results for orders placed or performed in visit on 11/30/18   CBC with platelets   Result Value Ref Range    WBC 6.3 4.0 - 11.0 10e9/L    RBC Count 4.51 3.8 - 5.2 10e12/L    Hemoglobin 13.0 11.7 - 15.7 g/dL    Hematocrit 39.8 35.0 - 47.0 %    MCV 88 78 - 100 fl    MCH 28.8 26.5 - 33.0 pg    MCHC 32.7 31.5 - 36.5 g/dL    RDW 13.8 10.0 - 15.0 %    Platelet Count 311 150 - 450 10e9/L   Basic metabolic panel  (Ca, Cl, CO2, Creat, Gluc, K, Na, BUN)   Result Value Ref Range    Sodium 140 133 - 144 mmol/L    Potassium 4.0 3.4 - 5.3 mmol/L    Chloride 106 94 - 109 mmol/L    Carbon Dioxide 27 20 - 32 mmol/L    Anion Gap 7 3 - 14 mmol/L    Glucose 99 70 - 99 mg/dL    Urea Nitrogen 10 7 - 30 mg/dL    Creatinine 0.76 0.52 - 1.04 mg/dL    GFR Estimate 78 >60 mL/min/1.7m2    GFR Estimate If Black >90 >60 mL/min/1.7m2    Calcium 9.2 8.5 - 10.1 mg/dL     EKG:WNL  Recent Labs   Lab Test  06/01/18   0953  11/10/17   1103  07/03/17   0824   12/07/15   1026   HGB  12.6  13.3   --    < >  12.7   PLT  310  340   --    < >  394   INR   --    --    --    --   0.93   NA  141   --   142   < >   --    POTASSIUM  4.1   --   4.0   < >   --    CR  0.68   --   0.62   < >   --     < > = values in this " interval not displayed.        IMPRESSION:   Reason for surgery/procedure: breast revision     The proposed surgical procedure is considered LOW risk.    REVISED CARDIAC RISK INDEX  The patient has the following serious cardiovascular risks for perioperative complications such as (MI, PE, VFib and 3  AV Block):  No serious cardiac risks  INTERPRETATION: 0 risks: Class I (very low risk - 0.4% complication rate)    The patient has the following additional risks for perioperative complications:  The 10-year ASCVD risk score (Tobi PLUMMER Jr, et al., 2013) is: 3.3%    Values used to calculate the score:      Age: 58 years      Sex: Female      Is Non- : No      Diabetic: No      Tobacco smoker: No      Systolic Blood Pressure: 134 mmHg      Is BP treated: Yes      HDL Cholesterol: 54 mg/dL      Total Cholesterol: 179 mg/dL      ICD-10-CM    1. Preop general physical exam Z01.818 EKG 12-lead complete w/read - Clinics     CBC with platelets     Basic metabolic panel  (Ca, Cl, CO2, Creat, Gluc, K, Na, BUN)   2. Obesity (BMI 30-39.9) E66.9 CBC with platelets     Basic metabolic panel  (Ca, Cl, CO2, Creat, Gluc, K, Na, BUN)   3. Hyperlipidemia LDL goal <130 E78.5 CBC with platelets     Basic metabolic panel  (Ca, Cl, CO2, Creat, Gluc, K, Na, BUN)   4. Hypertension goal BP (blood pressure) < 140/90 I10 CBC with platelets     Basic metabolic panel  (Ca, Cl, CO2, Creat, Gluc, K, Na, BUN)   5. Use of letrozole (Femara) Z79.811 CBC with platelets     Basic metabolic panel  (Ca, Cl, CO2, Creat, Gluc, K, Na, BUN)   6. Benign essential hypertension I10 carvedilol (COREG) 25 MG tablet       RECOMMENDATIONS:     --Patient is to take all scheduled medications on the day of surgery EXCEPT for modifications listed below.    Anticoagulant or Antiplatelet Medication Use  NSAIDS: Ibuprofen (Motrin):         Stop one day prior to surgery        ACE Inhibitor or Angiotensin Receptor Blocker (ARB) Use  Ace inhibitor or  Angiotensin Receptor Blocker (ARB) and will continue this medication due to the higher risk of uncontrolled perioperative hypertension (e.g. neurosurgical procedure)      APPROVAL GIVEN to proceed with proposed procedure, without further diagnostic evaluation       Signed Electronically by: Dilan Soto MD    Copy of this evaluation report is provided to requesting physician.    [place] Preop Guidelines    Revised Cardiac Risk Index

## 2018-12-13 ENCOUNTER — ANESTHESIA EVENT (OUTPATIENT)
Dept: SURGERY | Facility: CLINIC | Age: 58
End: 2018-12-13
Payer: COMMERCIAL

## 2018-12-14 ENCOUNTER — ANESTHESIA (OUTPATIENT)
Dept: SURGERY | Facility: CLINIC | Age: 58
End: 2018-12-14
Payer: COMMERCIAL

## 2018-12-14 ENCOUNTER — HOSPITAL ENCOUNTER (OUTPATIENT)
Facility: CLINIC | Age: 58
Discharge: HOME | End: 2018-12-14
Attending: PLASTIC SURGERY | Admitting: PLASTIC SURGERY
Payer: COMMERCIAL

## 2018-12-14 VITALS
BODY MASS INDEX: 34.8 KG/M2 | DIASTOLIC BLOOD PRESSURE: 69 MMHG | SYSTOLIC BLOOD PRESSURE: 131 MMHG | HEIGHT: 63 IN | TEMPERATURE: 96.6 F | RESPIRATION RATE: 20 BRPM | HEART RATE: 68 BPM | WEIGHT: 196.4 LBS | OXYGEN SATURATION: 99 %

## 2018-12-14 DIAGNOSIS — C50.911 INFILTRATING DUCTAL CARCINOMA OF RIGHT FEMALE BREAST (H): Primary | ICD-10-CM

## 2018-12-14 PROCEDURE — 25000125 ZZHC RX 250: Performed by: PLASTIC SURGERY

## 2018-12-14 PROCEDURE — 36000058 ZZH SURGERY LEVEL 3 EA 15 ADDTL MIN: Performed by: PLASTIC SURGERY

## 2018-12-14 PROCEDURE — 25000125 ZZHC RX 250: Performed by: ANESTHESIOLOGY

## 2018-12-14 PROCEDURE — 25000128 H RX IP 250 OP 636: Performed by: NURSE ANESTHETIST, CERTIFIED REGISTERED

## 2018-12-14 PROCEDURE — 25000125 ZZHC RX 250: Performed by: NURSE ANESTHETIST, CERTIFIED REGISTERED

## 2018-12-14 PROCEDURE — 40000170 ZZH STATISTIC PRE-PROCEDURE ASSESSMENT II: Performed by: PLASTIC SURGERY

## 2018-12-14 PROCEDURE — 71000027 ZZH RECOVERY PHASE 2 EACH 15 MINS: Performed by: PLASTIC SURGERY

## 2018-12-14 PROCEDURE — 71000012 ZZH RECOVERY PHASE 1 LEVEL 1 FIRST HR: Performed by: PLASTIC SURGERY

## 2018-12-14 PROCEDURE — 25000128 H RX IP 250 OP 636: Performed by: ANESTHESIOLOGY

## 2018-12-14 PROCEDURE — 37000009 ZZH ANESTHESIA TECHNICAL FEE, EACH ADDTL 15 MIN: Performed by: PLASTIC SURGERY

## 2018-12-14 PROCEDURE — 71000013 ZZH RECOVERY PHASE 1 LEVEL 1 EA ADDTL HR: Performed by: PLASTIC SURGERY

## 2018-12-14 PROCEDURE — 25000132 ZZH RX MED GY IP 250 OP 250 PS 637: Performed by: PLASTIC SURGERY

## 2018-12-14 PROCEDURE — 25000128 H RX IP 250 OP 636: Performed by: PLASTIC SURGERY

## 2018-12-14 PROCEDURE — 27210794 ZZH OR GENERAL SUPPLY STERILE: Performed by: PLASTIC SURGERY

## 2018-12-14 PROCEDURE — 37000008 ZZH ANESTHESIA TECHNICAL FEE, 1ST 30 MIN: Performed by: PLASTIC SURGERY

## 2018-12-14 PROCEDURE — 36000056 ZZH SURGERY LEVEL 3 1ST 30 MIN: Performed by: PLASTIC SURGERY

## 2018-12-14 RX ORDER — HYDROMORPHONE HYDROCHLORIDE 1 MG/ML
.3-.5 INJECTION, SOLUTION INTRAMUSCULAR; INTRAVENOUS; SUBCUTANEOUS EVERY 10 MIN PRN
Status: DISCONTINUED | OUTPATIENT
Start: 2018-12-14 | End: 2018-12-14 | Stop reason: HOSPADM

## 2018-12-14 RX ORDER — SODIUM CHLORIDE, SODIUM LACTATE, POTASSIUM CHLORIDE, CALCIUM CHLORIDE 600; 310; 30; 20 MG/100ML; MG/100ML; MG/100ML; MG/100ML
INJECTION, SOLUTION INTRAVENOUS CONTINUOUS
Status: DISCONTINUED | OUTPATIENT
Start: 2018-12-14 | End: 2018-12-14 | Stop reason: HOSPADM

## 2018-12-14 RX ORDER — PROPOFOL 10 MG/ML
INJECTION, EMULSION INTRAVENOUS CONTINUOUS PRN
Status: DISCONTINUED | OUTPATIENT
Start: 2018-12-14 | End: 2018-12-14

## 2018-12-14 RX ORDER — MAGNESIUM HYDROXIDE 1200 MG/15ML
LIQUID ORAL PRN
Status: DISCONTINUED | OUTPATIENT
Start: 2018-12-14 | End: 2018-12-14 | Stop reason: HOSPADM

## 2018-12-14 RX ORDER — BUPIVACAINE HYDROCHLORIDE AND EPINEPHRINE 2.5; 5 MG/ML; UG/ML
INJECTION, SOLUTION EPIDURAL; INFILTRATION; INTRACAUDAL; PERINEURAL
Status: DISCONTINUED
Start: 2018-12-14 | End: 2018-12-14 | Stop reason: HOSPADM

## 2018-12-14 RX ORDER — OXYCODONE AND ACETAMINOPHEN 5; 325 MG/1; MG/1
1 TABLET ORAL
Status: COMPLETED | OUTPATIENT
Start: 2018-12-14 | End: 2018-12-14

## 2018-12-14 RX ORDER — OXYCODONE AND ACETAMINOPHEN 5; 325 MG/1; MG/1
1 TABLET ORAL EVERY 6 HOURS PRN
Qty: 20 TABLET | Refills: 0 | Status: SHIPPED | OUTPATIENT
Start: 2018-12-14 | End: 2019-04-23

## 2018-12-14 RX ORDER — PROPOFOL 10 MG/ML
INJECTION, EMULSION INTRAVENOUS PRN
Status: DISCONTINUED | OUTPATIENT
Start: 2018-12-14 | End: 2018-12-14

## 2018-12-14 RX ORDER — FENTANYL CITRATE 50 UG/ML
25-50 INJECTION, SOLUTION INTRAMUSCULAR; INTRAVENOUS
Status: DISCONTINUED | OUTPATIENT
Start: 2018-12-14 | End: 2018-12-14 | Stop reason: HOSPADM

## 2018-12-14 RX ORDER — DEXAMETHASONE SODIUM PHOSPHATE 4 MG/ML
INJECTION, SOLUTION INTRA-ARTICULAR; INTRALESIONAL; INTRAMUSCULAR; INTRAVENOUS; SOFT TISSUE PRN
Status: DISCONTINUED | OUTPATIENT
Start: 2018-12-14 | End: 2018-12-14

## 2018-12-14 RX ORDER — SCOLOPAMINE TRANSDERMAL SYSTEM 1 MG/1
1 PATCH, EXTENDED RELEASE TRANSDERMAL
Status: DISCONTINUED | OUTPATIENT
Start: 2018-12-14 | End: 2018-12-14 | Stop reason: HOSPADM

## 2018-12-14 RX ORDER — DIPHENHYDRAMINE HYDROCHLORIDE 50 MG/ML
INJECTION INTRAMUSCULAR; INTRAVENOUS PRN
Status: DISCONTINUED | OUTPATIENT
Start: 2018-12-14 | End: 2018-12-14

## 2018-12-14 RX ORDER — CLINDAMYCIN PHOSPHATE 900 MG/50ML
900 INJECTION, SOLUTION INTRAVENOUS
Status: COMPLETED | OUTPATIENT
Start: 2018-12-14 | End: 2018-12-14

## 2018-12-14 RX ORDER — LIDOCAINE HYDROCHLORIDE 20 MG/ML
INJECTION, SOLUTION INFILTRATION; PERINEURAL PRN
Status: DISCONTINUED | OUTPATIENT
Start: 2018-12-14 | End: 2018-12-14

## 2018-12-14 RX ORDER — ONDANSETRON 4 MG/1
4 TABLET, ORALLY DISINTEGRATING ORAL EVERY 30 MIN PRN
Status: DISCONTINUED | OUTPATIENT
Start: 2018-12-14 | End: 2018-12-14 | Stop reason: HOSPADM

## 2018-12-14 RX ORDER — FENTANYL CITRATE 50 UG/ML
25-50 INJECTION, SOLUTION INTRAMUSCULAR; INTRAVENOUS EVERY 5 MIN PRN
Status: DISCONTINUED | OUTPATIENT
Start: 2018-12-14 | End: 2018-12-14 | Stop reason: HOSPADM

## 2018-12-14 RX ORDER — MEPERIDINE HYDROCHLORIDE 25 MG/ML
12.5 INJECTION INTRAMUSCULAR; INTRAVENOUS; SUBCUTANEOUS
Status: DISCONTINUED | OUTPATIENT
Start: 2018-12-14 | End: 2018-12-14 | Stop reason: HOSPADM

## 2018-12-14 RX ORDER — CEFAZOLIN SODIUM 1 G/3ML
INJECTION, POWDER, FOR SOLUTION INTRAMUSCULAR; INTRAVENOUS
Status: DISCONTINUED
Start: 2018-12-14 | End: 2018-12-14 | Stop reason: HOSPADM

## 2018-12-14 RX ORDER — NALOXONE HYDROCHLORIDE 0.4 MG/ML
.1-.4 INJECTION, SOLUTION INTRAMUSCULAR; INTRAVENOUS; SUBCUTANEOUS
Status: DISCONTINUED | OUTPATIENT
Start: 2018-12-14 | End: 2018-12-14 | Stop reason: HOSPADM

## 2018-12-14 RX ORDER — ONDANSETRON 2 MG/ML
INJECTION INTRAMUSCULAR; INTRAVENOUS PRN
Status: DISCONTINUED | OUTPATIENT
Start: 2018-12-14 | End: 2018-12-14

## 2018-12-14 RX ORDER — ASPIRIN 81 MG/1
81 TABLET ORAL DAILY
COMMUNITY
End: 2023-04-10

## 2018-12-14 RX ORDER — ONDANSETRON 2 MG/ML
4 INJECTION INTRAMUSCULAR; INTRAVENOUS EVERY 30 MIN PRN
Status: DISCONTINUED | OUTPATIENT
Start: 2018-12-14 | End: 2018-12-14 | Stop reason: HOSPADM

## 2018-12-14 RX ORDER — FENTANYL CITRATE 50 UG/ML
50-100 INJECTION, SOLUTION INTRAMUSCULAR; INTRAVENOUS
Status: COMPLETED | OUTPATIENT
Start: 2018-12-14 | End: 2018-12-14

## 2018-12-14 RX ORDER — CLINDAMYCIN PHOSPHATE 900 MG/50ML
900 INJECTION, SOLUTION INTRAVENOUS SEE ADMIN INSTRUCTIONS
Status: DISCONTINUED | OUTPATIENT
Start: 2018-12-14 | End: 2018-12-14 | Stop reason: HOSPADM

## 2018-12-14 RX ORDER — SODIUM CHLORIDE, SODIUM LACTATE, POTASSIUM CHLORIDE, CALCIUM CHLORIDE 600; 310; 30; 20 MG/100ML; MG/100ML; MG/100ML; MG/100ML
INJECTION, SOLUTION INTRAVENOUS CONTINUOUS PRN
Status: DISCONTINUED | OUTPATIENT
Start: 2018-12-14 | End: 2018-12-14

## 2018-12-14 RX ADMIN — SCOPALAMINE 1 PATCH: 1 PATCH, EXTENDED RELEASE TRANSDERMAL at 07:26

## 2018-12-14 RX ADMIN — SODIUM CHLORIDE, POTASSIUM CHLORIDE, SODIUM LACTATE AND CALCIUM CHLORIDE: 600; 310; 30; 20 INJECTION, SOLUTION INTRAVENOUS at 07:38

## 2018-12-14 RX ADMIN — CLINDAMYCIN PHOSPHATE 900 MG: 18 INJECTION, SOLUTION INTRAVENOUS at 07:50

## 2018-12-14 RX ADMIN — SODIUM CHLORIDE, POTASSIUM CHLORIDE, SODIUM LACTATE AND CALCIUM CHLORIDE: 600; 310; 30; 20 INJECTION, SOLUTION INTRAVENOUS at 08:51

## 2018-12-14 RX ADMIN — ONDANSETRON 4 MG: 2 INJECTION INTRAMUSCULAR; INTRAVENOUS at 08:42

## 2018-12-14 RX ADMIN — FENTANYL CITRATE 50 MCG: 50 INJECTION, SOLUTION INTRAMUSCULAR; INTRAVENOUS at 07:46

## 2018-12-14 RX ADMIN — DEXAMETHASONE SODIUM PHOSPHATE 4 MG: 4 INJECTION, SOLUTION INTRA-ARTICULAR; INTRALESIONAL; INTRAMUSCULAR; INTRAVENOUS; SOFT TISSUE at 07:51

## 2018-12-14 RX ADMIN — MIDAZOLAM 2 MG: 1 INJECTION INTRAMUSCULAR; INTRAVENOUS at 07:39

## 2018-12-14 RX ADMIN — ONDANSETRON 4 MG: 2 INJECTION INTRAMUSCULAR; INTRAVENOUS at 07:39

## 2018-12-14 RX ADMIN — OXYCODONE HYDROCHLORIDE AND ACETAMINOPHEN 1 TABLET: 5; 325 TABLET ORAL at 10:27

## 2018-12-14 RX ADMIN — PROPOFOL 200 MG: 10 INJECTION, EMULSION INTRAVENOUS at 07:46

## 2018-12-14 RX ADMIN — PROPOFOL 200 MCG/KG/MIN: 10 INJECTION, EMULSION INTRAVENOUS at 07:46

## 2018-12-14 RX ADMIN — Medication 12 MCG: at 08:09

## 2018-12-14 RX ADMIN — DIPHENHYDRAMINE HYDROCHLORIDE 12.5 MG: 50 INJECTION, SOLUTION INTRAMUSCULAR; INTRAVENOUS at 08:18

## 2018-12-14 RX ADMIN — Medication 8 MCG: at 07:58

## 2018-12-14 RX ADMIN — PROPOFOL 250 MCG/KG/MIN: 10 INJECTION, EMULSION INTRAVENOUS at 08:17

## 2018-12-14 RX ADMIN — FENTANYL CITRATE 50 MCG: 50 INJECTION, SOLUTION INTRAMUSCULAR; INTRAVENOUS at 08:02

## 2018-12-14 RX ADMIN — LIDOCAINE HYDROCHLORIDE 100 MG: 20 INJECTION, SOLUTION INFILTRATION; PERINEURAL at 07:46

## 2018-12-14 ASSESSMENT — MIFFLIN-ST. JEOR: SCORE: 1444.99

## 2018-12-14 ASSESSMENT — ENCOUNTER SYMPTOMS
ORTHOPNEA: 0
SEIZURES: 0
DYSRHYTHMIAS: 0

## 2018-12-14 NOTE — DISCHARGE INSTRUCTIONS
2-3 weeks w/ Dr. Patton.  Call [phone].  No heavy lifting or strenuous activity.  10 lb lifting limit.  Wear sports bra and binder.  May shower and remove dressing tomorrow.      Information for Patients Discharging with a Transderm Scopolamine Patch       Dry mouth is a common side effect.    Drowsiness is another common side effect especially when combined with pain medication.  Please avoid activities that require mental alertness such as driving a car or making important legal decisions.    Information for Patients Discharging with a Transderm Scopolamine Patch       Dry mouth is a common side effect.    Drowsiness is another common side effect especially when combined with pain medication.  Please avoid activities that require mental alertness such as driving a car or making important legal decisions.    Since Scopolamine can cause temporary dilation of the pupils and blurred vision if it comes in contact with the eyes; be sure to wash your hands thoroughly with soap and water immediately after handling the patch.   When you remove your patch, please stick it to a tissue or paper towel for disposal.      Remove the patch immediately and contact a physician in the unlikely event that you experience symptoms of acute glaucoma (pain and reddening of the eyes, accompanied by dilated pupils).    Remove the patch if you develop any difficulties urinating.  If you cannot urinate after removing your patch, please notify your surgeon.    Remove the patch 24 hours after surgery.      Same Day Surgery Discharge Instructions for  Sedation and General Anesthesia       It's not unusual to feel dizzy, light-headed or faint for up to 24 hours after surgery or while taking pain medication.  If you have these symptoms: sit for a few minutes before standing and have someone assist you when you get up to walk or use the bathroom.      You should rest and relax for the next 24 hours. We recommend you make arrangements to have  an adult stay with you for at least 24 hours after your discharge.  Avoid hazardous and strenuous activity.      DO NOT DRIVE any vehicle or operate mechanical equipment for 24 hours following the end of your surgery.  Even though you may feel normal, your reactions may be affected by the medication you have received.      Do not drink alcoholic beverages for 24 hours following surgery.       Slowly progress to your regular diet as you feel able. It's not unusual to feel nauseated and/or vomit after receiving anesthesia.  If you develop these symptoms, drink clear liquids (apple juice, ginger ale, broth, 7-up, etc. ) until you feel better.  If your nausea and vomiting persists for 24 hours, please notify your surgeon.        All narcotic pain medications, along with inactivity and anesthesia, can cause constipation. Drinking plenty of liquids and increasing fiber intake will help.      For any questions of a medical nature, call your surgeon.      Do not make important decisions for 24 hours.      If you had general anesthesia, you may have a sore throat for a couple of days related to the breathing tube used during surgery.  You may use Cepacol lozenges to help with this discomfort.  If it worsens or if you develop a fever, contact your surgeon.       If you feel your pain is not well managed with the pain medications prescribed by your surgeon, please contact your surgeon's office to let them know so they can address your concerns.       Discharge Instructions following Breast Surgery  [facility] Same Day Surgery    Diet:   Resume diet as tolerated.  Drink plenty of fluids to prevent constipation.    Activity:   Gentle rotation & stretching of your arms/shoulders will prevent stiffness in joints   Increase activity gradually   No heavy lifting greater than 10-15 pounds & no strenuous activity until  approved by surgeon    Bathing/Incision Care:   You may shower as directed by surgeon   Pat incisions dry.   No lotions, powders or perfumes to incisions   Tape dressings (steri strips) will fall off in 7-10 days (if present)    What to expect:   A tingly or itchy sensation around the incision is a normal sign of healing   Some clear, pink drainage from incisions is normal.      Notify your surgeon for the following signs & symptoms:   Redness, warmth, or swelling of the incision    Foul smelling or increased drainage   Chills or temperature greater than 101 F   Pain not controlled by pain medications        **If you have questions or concerns about your procedure,   call Dr Patton at [phone]**

## 2018-12-14 NOTE — ANESTHESIA POSTPROCEDURE EVALUATION
Patient: Theresa M Delosreyes    Procedure(s):  REVISION OF BILATERAL BREAST RECONSTRUCTION ( VASER )  GRAFT FAT  FROM ABDOMEN TO BILATERAL  BREAST    Diagnosis:HISTORY OF BILATERAL MASTECTOMIES   Diagnosis Additional Information: No value filed.    Anesthesia Type:  General, LMA    Note:  Anesthesia Post Evaluation    Patient location during evaluation: PACU  Patient participation: Able to fully participate in evaluation  Level of consciousness: awake and alert  Pain management: adequate  Airway patency: patent  Cardiovascular status: acceptable and hemodynamically stable  Respiratory status: nonlabored ventilation, unassisted and acceptable  Hydration status: acceptable  PONV: none             Last vitals:  Vitals:    12/14/18 1030 12/14/18 1045 12/14/18 1155   BP: 130/60 128/68 131/69   Pulse:      Resp: 11 21 20   Temp: 36  C (96.8  F) 35.9  C (96.6  F)    SpO2: 98% 99% 99%         Electronically Signed By: Tu Tabor MD  December 14, 2018  4:37 PM

## 2018-12-14 NOTE — ANESTHESIA PREPROCEDURE EVALUATION
"Anesthesia Pre-Procedure Evaluation    Patient: Theresa M Delosreyes   MRN: [id number] : 1960          Preoperative Diagnosis: HISTORY OF BILATERAL MASTECTOMIES     Procedure(s):  REVISION OF BILATERAL BREAST RECONSTRUCTION ( VASER )  GRAFT FAT  FROM ABDOMEN TO BILATERAL  BREAST    Past Medical History:   Diagnosis Date     Benign hypertension     started medication 3/13     Cancer (H)     breast cancer, right     Chest pain     side effect of Femera     Female stress incontinence 2006    s/p sling procedure     Mixed hyperlipidemia      Motion sickness      Obesity (BMI 30-39.9)      BRIT (obstructive sleep apnea)     not using CPAP, treated with tonsilliectomy     Osteoarthritis of both knees      PONV (postoperative nausea and vomiting)      Status post laparoscopic cholecystectomy 2005    Cholelithiasis with mild subacute hemorrhagic cholecystitis     Supervision of other normal pregnancy      - vaginal     Uncomplicated asthma     with respiratory infections     Past Surgical History:   Procedure Laterality Date     COLONOSCOPY  2006    ormal to lt transverse colon, incomplete due to \"extremely redundant colon\"\"     D & C  2004     EXTRACT LENS CLEAR, EXCHANGE LENS REFRACTIVE, COMBINED  3/2013    bilateral     GRAFT FAT TO BREAST Bilateral 2016    Procedure: GRAFT FAT TO BREAST;  Surgeon: Alhaji Patton MD;  Location: [facility]     GRAFT FREE VASCULARIZED TRANSVERSE RECTUS ABDOMINIS MYOCUTANEOUS Bilateral 2016    Procedure: GRAFT FREE VASCULARIZED TRANSVERSE RECTUS ABDOMINIS MYOCUTANEOUS;  Surgeon: Alhaji Patton MD;  Location: [facility]     HC ABLATION, ENDOMETRIAL, THERMAL, W/O HYSTEROSCOPIC GUIDANCE  2004    NovaSure endometrial ablation.      HC COLON AIR CONTRAST  2006    normal     HC SLING OPERATION FOR STRESS INCONTINENCE  2007    [facility]arc sling procedure     INSERT PORT VASCULAR ACCESS Left 2015    Procedure: INSERT PORT VASCULAR " ACCESS;  Surgeon: August Rdz MD;  Location:  [facility]     INSERT TISSUE EXPANDER BREAST BILATERAL Bilateral 9/29/2015    Procedure: INSERT TISSUE EXPANDER BREAST BILATERAL;  Surgeon: Alhaji Patton MD;  Location:  OR     LAPAROSCOPIC CHOLECYSTECTOMY WITH CHOLANGIOGRAMS  1/2005    Laparoscopic cholecystectomy with intraoperative  cholangiogram     MASTECTOMY SIMPLE BILATERAL, SENTINEL NODE BILATERAL, COMBINED Bilateral 9/29/2015    Procedure: COMBINED MASTECTOMY SIMPLE BILATERAL, SENTINEL NODE BILATERAL;  Surgeon: August Rdz MD;  Location:  OR     RECONSTRUCT BREAST BILATERAL Bilateral 11/18/2016    Procedure: RECONSTRUCT BREAST BILATERAL;  Surgeon: Alhaji Patton MD;  Location:  [facility]     REMOVE PORT VASCULAR ACCESS N/A 11/18/2016    Procedure: REMOVE PORT VASCULAR ACCESS;  Surgeon: Alhaji Patton MD;  Location:  [facility]     REMOVE TISSUE EXPANDER BREAST Bilateral 4/12/2016    Procedure: REMOVE TISSUE EXPANDER BREAST;  Surgeon: Alhaji Patton MD;  Location:  OR     REVISE SCAR TRUNK N/A 11/18/2016    Procedure: REVISE SCAR TRUNK;  Surgeon: Alhaji Patton MD;  Location:  [facility]     TONSILLECTOMY  2001     VASCULAR SURGERY      insert port and removed       Anesthesia Evaluation     . Pt has had prior anesthetic. Type: General    History of anesthetic complications   - PONV and motion sickness        ROS/MED HX    ENT/Pulmonary:     (+)sleep apnea (agrees to sleep postop in a reclining bed - treats symptoms), Intermittent asthma (only with URIs) doesn't use CPAP , . .   (-) recent URI   Neurologic:      (-) seizures and CVA   Cardiovascular:     (+) Dyslipidemia, hypertension----. : . . . :. . Previous cardiac testing Echodate:4/2017results:The left ventricle is normal in size.  There is normal left ventricular wall thickness.  The visual ejection fraction is estimated at 55-60%.  The transmitral spectral Doppler flow pattern is suggestive of  diastolic  dysfunction of the left ventricle.  No regional wall motion abnormalities noted.  The right ventricle is normal in structure, function and size.  The mitral valve leaflets are mildly thickened.  There is trace mitral regurgitation.  There is trace tricuspid regurgitation.  There is trivial trileaflet aortic sclerosis.  The rhythm was normal sinus.  The study was technically difficult. Limited views were obtained. Compared to  prior study, there is no significant change. No contrast used due to reaction  to Definity.date: results:ECG reviewed date:11/2018 results:NSR date: results:         (-) CAD, orthopnea/PND, syncope, arrhythmias, irregular heartbeat/palpitations and valvular problems/murmurs   METS/Exercise Tolerance:  >4 METS   Hematologic:         Musculoskeletal:         GI/Hepatic:        (-) GERD and liver disease   Renal/Genitourinary:      (-) renal disease   Endo:     (+) Obesity, .   (-) Type II DM and thyroid disease   Psychiatric:         Infectious Disease:         Malignancy:   (+) Malignancy History of Breast  Breast CA Remission status post Surgery.         Other:                          Physical Exam  Normal systems: dental    Airway   Mallampati: II  TM distance: >3 FB  Neck ROM: full    Dental     Cardiovascular   Rhythm and rate: regular and normal  (-) no murmur    Pulmonary    breath sounds clear to auscultation(-) no wheezes            Lab Results   Component Value Date    WBC 6.3 11/30/2018    HGB 13.0 11/30/2018    HCT 39.8 11/30/2018     11/30/2018    CRP <3.0 07/22/2008    SED 14 07/22/2008     11/30/2018    POTASSIUM 4.0 11/30/2018    CHLORIDE 106 11/30/2018    CO2 27 11/30/2018    BUN 10 11/30/2018    CR 0.76 11/30/2018    GLC 99 11/30/2018    CARLIE 9.2 11/30/2018    ALBUMIN 3.8 10/04/2018    PROTTOTAL 8.0 10/04/2018    ALT 32 10/04/2018    AST 15 10/04/2018    ALKPHOS 73 10/04/2018    BILITOTAL 0.5 10/04/2018    PTT 28 12/07/2015    INR 0.93 12/07/2015    TSH  "2.34 04/03/2015    T4 1.23 09/11/2014    HCG Negative 09/29/2015       Preop Vitals  BP Readings from Last 3 Encounters:   11/30/18 134/78   10/04/18 127/81   06/01/18 124/80    Pulse Readings from Last 3 Encounters:   11/30/18 72   10/04/18 76   06/01/18 68      Resp Readings from Last 3 Encounters:   11/30/18 14   10/04/18 14   06/01/18 16    SpO2 Readings from Last 3 Encounters:   11/30/18 99%   10/04/18 99%   06/01/18 100%      Temp Readings from Last 1 Encounters:   11/30/18 37.2  C (98.9  F) (Tympanic)    Ht Readings from Last 1 Encounters:   11/30/18 1.6 m (5' 3\")      Wt Readings from Last 1 Encounters:   11/30/18 89.8 kg (198 lb)    Estimated body mass index is 35.07 kg/m  as calculated from the following:    Height as of 11/30/18: 1.6 m (5' 3\").    Weight as of 11/30/18: 89.8 kg (198 lb).       Anesthesia Plan      History & Physical Review  History and physical reviewed and following examination; no interval change.    ASA Status:  3 .    NPO Status:  > 8 hours    Plan for General and LMA with Propofol and Intravenous induction. Maintenance will be TIVA.    PONV prophylaxis:  Dexamethasone or Solumedrol, Ondansetron (or other 5HT-3), Other (See comment) and Scopolamine patch  Propofol infusion/TIVA  Decadron prior to incision  Zofran 8 mg (divided)  Benadryl 12.5 mg IV  Scopolamine patch      Postoperative Care  Postoperative pain management:  Multi-modal analgesia.      Consents  Anesthetic plan, risks, benefits and alternatives discussed with:  Patient..                 Tu Tabor MD  "

## 2018-12-15 NOTE — OP NOTE
Procedure Date: 12/14/2018      PREOPERATIVE DIAGNOSIS:  History of breast cancer with acquired absence of bilateral breasts.      POSTOPERATIVE DIAGNOSIS:  History of breast cancer with acquired absence of bilateral breasts.      PROCEDURE:  Revision of bilateral breast reconstruction including fat grafting from abdomen.      TECHNIQUE:  The patient was marked preoperatively.  We discussed the planned adjustments which would be taking down some infra-axillary fullness bilaterally, adding some upper pole fullness, particularly on the left side with fat grafting, and trying to redefine or improve the definition of her intermammary crease through a previous dog ear incision medially on the right breast.  Risks and benefits were discussed and she was agreeable.  She was placed supine on the procedure table under general LMA anesthesia, administered by the anesthesia department.  The breasts were prepped and draped in a sterile fashion and a timeout was done.  I began by harvesting the fat.  I injected the abdomen and hip areas with a mixture of 1 liter of normal saline with 1 mL of 1:1000 epinephrine and 25 mL of 1% plain lidocaine.  Approximately 700 mL of this was injected into the subcutaneous tissues of the abdomen and flanks and in a little bit in the lateral infra-axillary areas.  I used a 4 mm cannula to harvest the fat into a Revolve system.  The fat was processed into smaller syringes for injection.  The harvest sites were closed with 4-0 Vicryl.  I  further treated the infra-axillary areas with further liposuction until a nice smooth transition was seen between the lateral chest wall and the breast.  I excised a small medial dog ear on the right side and through this incision, I exposed the presternal skin and then placed several 2-0 Vicryl sutures to try to tack this down and improve her intermammary definition.  This incision was closed with 3-0 and 4-0 Vicryl.  I then transferred the fat using a 2 mm  syringe in a fan like fashion on both sides, injecting 100 mL on the left, 60 mL of the right.  This included a little bit of injection into the hollow of the right axillary area.  A nice symmetry was seen.  The injection sites were closed with 4-0 Vicryl.  Sterile dressings were applied.  Anesthesia was reversed and the patient was taken to the recovery room in satisfactory condition.         MARIUM TOM MD             D: 2018   T: 12/15/2018   MT: YAA      Name:     DELOSREYES, THERESA   MRN:      [id number]        Account:        [id number]   :      1960           Procedure Date: 2018      Document: [id number]

## 2019-01-01 ENCOUNTER — TRANSFERRED RECORDS (OUTPATIENT)
Dept: HEALTH INFORMATION MANAGEMENT | Facility: CLINIC | Age: 59
End: 2019-01-01

## 2019-01-01 LAB — PAP SMEAR - HIM PATIENT REPORTED: NEGATIVE

## 2019-01-25 DIAGNOSIS — B35.1 ONYCHOMYCOSIS: Primary | ICD-10-CM

## 2019-01-25 RX ORDER — TERBINAFINE HYDROCHLORIDE 250 MG/1
TABLET ORAL
Qty: 30 TABLET | Refills: 2 | OUTPATIENT
Start: 2019-01-25

## 2019-01-25 NOTE — TELEPHONE ENCOUNTER
terbinafine (LAMISIL) 250 MG tablet     Last Written Prescription Date:  10/5/18  Last Fill Quantity: 30,  # refills: 2   Last office visit: 11/30/2018 with prescribing provider:  Charles   Future Office Visit:      Requested Prescriptions   Pending Prescriptions Disp Refills     terbinafine (LAMISIL) 250 MG tablet [Pharmacy Med Name: TERBINAFINE  MG TABLET] 30 tablet 2     Sig: TAKE 1 TABLET BY MOUTH EVERY DAY    There is no refill protocol information for this order          Routing refill request to provider for review/approval because:  Drug not on the [facility] [place] [place] refill protocol       Alicia BURTONN, RN   [facility]

## 2019-01-25 NOTE — TELEPHONE ENCOUNTER
Patient was called was scheduled for a lab only on Monday 1/28/19    Alicia ZELAYA, RN   [facility]

## 2019-01-28 DIAGNOSIS — B35.1 ONYCHOMYCOSIS: ICD-10-CM

## 2019-01-28 PROCEDURE — 36415 COLL VENOUS BLD VENIPUNCTURE: CPT | Performed by: FAMILY MEDICINE

## 2019-01-28 PROCEDURE — 80076 HEPATIC FUNCTION PANEL: CPT | Performed by: FAMILY MEDICINE

## 2019-01-29 ENCOUNTER — TELEPHONE (OUTPATIENT)
Dept: FAMILY MEDICINE | Facility: CLINIC | Age: 59
End: 2019-01-29

## 2019-01-29 DIAGNOSIS — B35.1 ONYCHOMYCOSIS: Primary | ICD-10-CM

## 2019-01-29 DIAGNOSIS — B35.1 ONYCHOMYCOSIS: ICD-10-CM

## 2019-01-29 LAB
ALBUMIN SERPL-MCNC: 4.1 G/DL (ref 3.4–5)
ALP SERPL-CCNC: 88 U/L (ref 40–150)
ALT SERPL W P-5'-P-CCNC: 25 U/L (ref 0–50)
AST SERPL W P-5'-P-CCNC: 18 U/L (ref 0–45)
BILIRUB DIRECT SERPL-MCNC: 0.1 MG/DL (ref 0–0.2)
BILIRUB SERPL-MCNC: 0.4 MG/DL (ref 0.2–1.3)
PROT SERPL-MCNC: 8 G/DL (ref 6.8–8.8)

## 2019-01-29 RX ORDER — TERBINAFINE HYDROCHLORIDE 250 MG/1
TABLET ORAL
Qty: 30 TABLET | Refills: 2 | OUTPATIENT
Start: 2019-01-29

## 2019-01-29 RX ORDER — TERBINAFINE HYDROCHLORIDE 250 MG/1
250 TABLET ORAL DAILY
Qty: 90 TABLET | Refills: 0 | Status: SHIPPED | OUTPATIENT
Start: 2019-01-29 | End: 2019-04-23

## 2019-01-29 NOTE — TELEPHONE ENCOUNTER
Requested Prescriptions   Pending Prescriptions Disp Refills     terbinafine (LAMISIL) 250 MG tablet [Pharmacy Med Name: TERBINAFINE  MG TABLET] 30 tablet 2     Sig: TAKE 1 TABLET BY MOUTH EVERY DAY    There is no refill protocol information for this order   Last Written Prescription Date:  10/5/2018  Last Fill Quantity: 30,  # refills: 2   Last office visit: 11/30/2018 with prescribing provider:  ARAM Soto   Future Office Visit:

## 2019-04-23 ENCOUNTER — OFFICE VISIT (OUTPATIENT)
Dept: FAMILY MEDICINE | Facility: CLINIC | Age: 59
End: 2019-04-23
Payer: COMMERCIAL

## 2019-04-23 VITALS
SYSTOLIC BLOOD PRESSURE: 138 MMHG | TEMPERATURE: 99.4 F | HEIGHT: 63 IN | HEART RATE: 82 BPM | RESPIRATION RATE: 14 BRPM | OXYGEN SATURATION: 98 % | DIASTOLIC BLOOD PRESSURE: 80 MMHG | WEIGHT: 194 LBS | BODY MASS INDEX: 34.38 KG/M2

## 2019-04-23 DIAGNOSIS — J01.90 ACUTE SINUSITIS WITH SYMPTOMS > 10 DAYS: Primary | ICD-10-CM

## 2019-04-23 PROCEDURE — 99213 OFFICE O/P EST LOW 20 MIN: CPT | Performed by: FAMILY MEDICINE

## 2019-04-23 RX ORDER — AZITHROMYCIN 250 MG/1
TABLET, FILM COATED ORAL
Qty: 6 TABLET | Refills: 0 | Status: SHIPPED | OUTPATIENT
Start: 2019-04-23 | End: 2019-06-17

## 2019-04-23 RX ORDER — TERBINAFINE HYDROCHLORIDE 250 MG/1
1 TABLET ORAL DAILY
Refills: 0 | COMMUNITY
Start: 2019-01-29 | End: 2019-06-17

## 2019-04-23 ASSESSMENT — MIFFLIN-ST. JEOR: SCORE: 1434.11

## 2019-04-23 NOTE — PROGRESS NOTES
"  SUBJECTIVE:   Theresa M Delosreyes is a 58 year old female who presents to clinic today for the following   health issues:      RESPIRATORY SYMPTOMS      Duration: 3 weeks     Description  Cough, right eat discomfort  and chest pain     Severity: moderate    Accompanying signs and symptoms: right low back pain     History (predisposing factors):  none    Precipitating or alleviating factors: None    Therapies tried and outcome:  Mucinex, Advil, Nyquil, Sudafed       Additional history: as documented    Reviewed  and updated as needed this visit by clinical staff         Reviewed and updated as needed this visit by Provider         Patient Active Problem List   Diagnosis     Female stress incontinence     Status post laparoscopic cholecystectomy     Osteoarthritis of both knees     Obesity (BMI 30-39.9)     BRIT (obstructive sleep apnea)     Hyperlipidemia LDL goal <130     Hypertension goal BP (blood pressure) < 140/90     Chest pain     Infiltrating ductal carcinoma of right female breast (H)     Antineoplastic antibiotics causing adverse effect in therapeutic use     Breast cancer (H)     Benign essential hypertension     Drugs and medicinal substances causing adverse effect in therapeutic use, initial encounter     Obesity (BMI 35.0-39.9) with comorbidity (H)     Past Surgical History:   Procedure Laterality Date     COLONOSCOPY  5/2006    ormal to lt transverse colon, incomplete due to \"extremely redundant colon\"\"     D & C  9/2004     EXTRACT LENS CLEAR, EXCHANGE LENS REFRACTIVE, COMBINED  3/2013    bilateral     GRAFT FAT TO BREAST Bilateral 11/18/2016    Procedure: GRAFT FAT TO BREAST;  Surgeon: Alhaji Patton MD;  Location: [facility]     GRAFT FAT TO BREAST  12/14/2018    Procedure: GRAFT FAT  FROM ABDOMEN TO BILATERAL  BREAST;  Surgeon: Alhaji Patton MD;  Location: [facility]     GRAFT FREE VASCULARIZED TRANSVERSE RECTUS ABDOMINIS MYOCUTANEOUS Bilateral 4/12/2016    Procedure: GRAFT FREE " VASCULARIZED TRANSVERSE RECTUS ABDOMINIS MYOCUTANEOUS;  Surgeon: Alhaji Patton MD;  Location:  OR     HC ABLATION, ENDOMETRIAL, THERMAL, W/O HYSTEROSCOPIC GUIDANCE  11/2004    NovaSure endometrial ablation.      HC COLON AIR CONTRAST  5/2006    normal     HC SLING OPERATION FOR STRESS INCONTINENCE  1/2007    [facility]arc sling procedure     INSERT PORT VASCULAR ACCESS Left 11/9/2015    Procedure: INSERT PORT VASCULAR ACCESS;  Surgeon: August Rdz MD;  [facility]:  [facility]     INSERT TISSUE EXPANDER BREAST BILATERAL Bilateral 9/29/2015    Procedure: INSERT TISSUE EXPANDER BREAST BILATERAL;  Surgeon: Alhaji Patton MD;  Location:  OR     LAPAROSCOPIC CHOLECYSTECTOMY WITH CHOLANGIOGRAMS  1/2005    Laparoscopic cholecystectomy with intraoperative  cholangiogram     MASTECTOMY SIMPLE BILATERAL, SENTINEL NODE BILATERAL, COMBINED Bilateral 9/29/2015    Procedure: COMBINED MASTECTOMY SIMPLE BILATERAL, SENTINEL NODE BILATERAL;  Surgeon: August Rdz MD;  Location:  OR     RECONSTRUCT BREAST BILATERAL Bilateral 11/18/2016    Procedure: RECONSTRUCT BREAST BILATERAL;  Surgeon: Alhaji Patton MD;  Location:  [facility]     REMOVE PORT VASCULAR ACCESS N/A 11/18/2016    Procedure: REMOVE PORT VASCULAR ACCESS;  Surgeon: Alhaji Patton MD;  Location:  [facility]     REMOVE TISSUE EXPANDER BREAST Bilateral 4/12/2016    Procedure: REMOVE TISSUE EXPANDER BREAST;  Surgeon: Alhaji Patton MD;  Location:  OR     REVISE RECONSTRUCTED BREAST BILATERAL Bilateral 12/14/2018    Procedure: REVISION OF BILATERAL BREAST RECONSTRUCTION ( VASER );  Surgeon: Alhaji Patton MD;  Location:  [facility]     REVISE SCAR TRUNK N/A 11/18/2016    Procedure: REVISE SCAR TRUNK;  Surgeon: Alhaji Patton MD;  Location:  [facility]     TONSILLECTOMY  2001     VASCULAR SURGERY      insert port and removed       Social History     Tobacco Use     Smoking status: Never Smoker     Smokeless tobacco:  Never Used   Substance Use Topics     Alcohol use: No     Alcohol/week: 0.0 oz     Family History   Problem Relation Age of Onset     Asthma Daughter      C.A.D. Father 40        MI     Cerebrovascular Disease Father      Hypertension Mother      Breast Cancer Mother      Thyroid Disease Mother         hypothyroidism     Sleep Apnea Mother      Breast Cancer Sister      Hypertension Sister      Hypertension Brother      Heart Disease Brother         pacemaker     Sleep Apnea Brother      Hypertension Brother      Diabetes Brother         type 2     Sleep Apnea Brother      Myocardial Infarction Maternal Grandmother      Diabetes Paternal Grandmother      Unknown/Adopted Maternal Grandfather      Unknown/Adopted Paternal Grandfather      Diabetes Maternal Aunt      Cancer - colorectal Maternal Uncle      Diabetes Maternal Aunt         with retinopathy leading to blindness         Current Outpatient Medications   Medication Sig Dispense Refill     aspirin 81 MG EC tablet Take 81 mg by mouth daily       azithromycin (ZITHROMAX) 250 MG tablet Two tablets first day, then one tablet daily for four days. 6 tablet 0     candesartan cilexetil 32 MG TABS Take 32 mg by mouth daily 90 tablet 1     carvedilol (COREG) 25 MG tablet Take 1 tablet (25 mg) by mouth daily Take one tablet (25 mg) by mouth twice a day with meals 90 tablet 3     Cyanocobalamin (VITAMIN B12 PO) Take by mouth daily       letrozole (FEMARA) 2.5 MG tablet Take 2.5 mg by mouth At Bedtime   0     VITAMIN D, CHOLECALCIFEROL, PO Take 3,000 Units by mouth daily       denosumab (PROLIA) 60 MG/ML SOLN injection Inject 60 mg Subcutaneous every 6 months       terbinafine (LAMISIL) 250 MG tablet Take 1 tablet by mouth daily  0     Allergies   Allergen Reactions     Definity Other (See Comments)     A substance that helps with imaging the heart muscle with echocardiology    Full body ache starting in the low back.      Keflex [Cephalexin]      Hand swelling,  "blisters???     Recent Labs   Lab Test 01/28/19  1543 11/30/18  1036 10/04/18  1842 06/01/18  0953 07/03/17  0824  04/03/15  0717 09/11/14  1656   LDL  --   --   --  100* 110*  --  84  --    HDL  --   --   --  54 65  --  62  --    TRIG  --   --   --  126 146  --  119  --    ALT 25  --  32 48 29  --   --   --    CR  --  0.76  --  0.68 0.62   < > 0.64  --    GFRESTIMATED  --  78  --  89 >90  Non  GFR Calc     < > >90  Non  GFR Calc    --    GFRESTBLACK  --  >90  --  >90 >90  African American GFR Calc     < > >90   GFR Calc    --    POTASSIUM  --  4.0  --  4.1 4.0   < > 3.6  --    TSH  --   --   --   --   --   --  2.34 2.42    < > = values in this interval not displayed.      BP Readings from Last 3 Encounters:   04/23/19 138/80   12/14/18 131/69   11/30/18 134/78    Wt Readings from Last 3 Encounters:   04/23/19 88 kg (194 lb)   12/14/18 89.1 kg (196 lb 6.4 oz)   11/30/18 89.8 kg (198 lb)                    ROS:  Constitutional, HEENT, cardiovascular, pulmonary, gi and gu systems are negative, except as otherwise noted.    OBJECTIVE:     /80 (Cuff Size: Adult Regular)   Pulse 82   Temp 99.4  F (37.4  C) (Tympanic)   Resp 14   Ht 1.6 m (5' 3\")   Wt 88 kg (194 lb)   LMP 02/23/2013   SpO2 98%   BMI 34.37 kg/m    Body mass index is 34.37 kg/m .  GENERAL: healthy, alert and no distress  NECK: no adenopathy, no asymmetry, masses, or scars and thyroid normal to palpation  RESP: lungs clear to auscultation - no rales, rhonchi or wheezes  CV: regular rate and rhythm, normal S1 S2, no S3 or S4, no murmur, click or rub, no peripheral edema and peripheral pulses strong  ABDOMEN: soft, nontender, no hepatosplenomegaly, no masses and bowel sounds normal  MS: no gross musculoskeletal defects noted, no edema        ASSESSMENT/PLAN:   Melina was seen today for uri.    Diagnoses and all orders for this visit:    Acute sinusitis with symptoms > 10 days  -     azithromycin " (ZITHROMAX) 250 MG tablet; Two tablets first day, then one tablet daily for four days.        Dilan Soto MD  [facility]

## 2019-04-24 DIAGNOSIS — I10 BENIGN ESSENTIAL HYPERTENSION: ICD-10-CM

## 2019-04-24 RX ORDER — CANDESARTAN 32 MG/1
TABLET ORAL
Qty: 90 TABLET | Refills: 1 | Status: SHIPPED | OUTPATIENT
Start: 2019-04-24 | End: 2019-10-20

## 2019-04-24 NOTE — TELEPHONE ENCOUNTER
"Requested Prescriptions   Pending Prescriptions Disp Refills     candesartan (ATACAND) 32 MG tablet [Pharmacy Med Name: CANDESARTAN CILEXETIL 32 MG TB] 90 tablet 1     Sig: TAKE 1 TABLET BY MOUTH DAILY       Angiotensin-II Receptors Passed - 4/24/2019  2:03 AM        Passed - Blood pressure under 140/90 in past 12 months     BP Readings from Last 3 Encounters:   04/23/19 138/80   12/14/18 131/69   11/30/18 134/78                 Passed - Recent (12 mo) or future (30 days) visit within the authorizing provider's specialty     Patient had office visit in the last 12 months or has a visit in the next 30 days with authorizing provider or within the authorizing provider's specialty.  See \"Patient Info\" tab in inbasket, or \"Choose Columns\" in Meds & Orders section of the refill encounter.              Passed - Medication is active on med list        Passed - Patient is age 18 or older        Passed - No active pregnancy on record        Passed - Normal serum creatinine on file in past 12 months     Recent Labs   Lab Test 11/30/18  1036   CR 0.76             Passed - Normal serum potassium on file in past 12 months     Recent Labs   Lab Test 11/30/18  1036   POTASSIUM 4.0                    Passed - No positive pregnancy test in past 12 months          Last Written Prescription Date:  10/26/2018  Last Fill Quantity: 90,  # refills: 1   Last office visit: 4/23/2019 with prescribing provider:  4/23/2019   Future Office Visit:      "

## 2019-04-28 DIAGNOSIS — B35.1 ONYCHOMYCOSIS: Primary | ICD-10-CM

## 2019-04-29 RX ORDER — TERBINAFINE HYDROCHLORIDE 250 MG/1
TABLET ORAL
Qty: 90 TABLET | Refills: 0 | OUTPATIENT
Start: 2019-04-29

## 2019-05-02 DIAGNOSIS — B35.1 ONYCHOMYCOSIS: ICD-10-CM

## 2019-05-02 RX ORDER — TERBINAFINE HYDROCHLORIDE 250 MG/1
TABLET ORAL
Qty: 90 TABLET | Refills: 0 | OUTPATIENT
Start: 2019-05-02

## 2019-05-02 NOTE — TELEPHONE ENCOUNTER
I copied my note below, please check on and relay the message            Dilan Soto MD   Physician   Family Practice   Telephone Encounter   Signed   Encounter Date:  4/28/2019                        []Hide copied text    []Hover for details      Since she finished 3 months course of Lamisil recently, I would like to give her a break to minimize the liver toxicity from the meds.   Please have her to recheck LFT in 1 month before considering resuming medicine. Future order placed  thx

## 2019-05-02 NOTE — TELEPHONE ENCOUNTER
Requested Prescriptions   Pending Prescriptions Disp Refills     terbinafine (LAMISIL) 250 MG tablet [Pharmacy Med Name: TERBINAFINE  MG TABLET] 90 tablet 0     Sig: TAKE 1 TABLET BY MOUTH EVERY DAY       There is no refill protocol information for this order        Last Written Prescription Date:  LISTED PT REPORTED    Last Fill Quantity: NA,  # refills: NA   Last office visit: 4/23/2019 with prescribing provider:  With Dr Soto   Future Office Visit:

## 2019-05-02 NOTE — TELEPHONE ENCOUNTER
Routing refill request to provider for review/approval because:  Drug not on the [facility] refill protocol     JOSEPHINE RigginsN, RN  [facility]

## 2019-05-02 NOTE — TELEPHONE ENCOUNTER
Patient was notified with information noted by provider and agreed with plan.  Patient stated she has not completed the medication yet but will schedule a lab only appointment after the 1 month has lapsed.  JOSEPHINE FlynnN, RN  [facility]

## 2019-06-04 ENCOUNTER — HOSPITAL ENCOUNTER (OUTPATIENT)
Dept: BONE DENSITY | Facility: CLINIC | Age: 59
Discharge: HOME | End: 2019-06-04
Attending: INTERNAL MEDICINE | Admitting: INTERNAL MEDICINE
Payer: COMMERCIAL

## 2019-06-04 DIAGNOSIS — Z79.811 USE OF AROMATASE INHIBITORS: ICD-10-CM

## 2019-06-04 DIAGNOSIS — C50.919 MALIGNANT NEOPLASM OF FEMALE BREAST, UNSPECIFIED ESTROGEN RECEPTOR STATUS, UNSPECIFIED LATERALITY, UNSPECIFIED SITE OF BREAST (H): ICD-10-CM

## 2019-06-04 PROCEDURE — 77080 DXA BONE DENSITY AXIAL: CPT

## 2019-06-07 ENCOUNTER — TRANSFERRED RECORDS (OUTPATIENT)
Dept: HEALTH INFORMATION MANAGEMENT | Facility: CLINIC | Age: 59
End: 2019-06-07

## 2019-06-07 LAB
ALT SERPL-CCNC: 25 U/L (ref 7–40)
AST SERPL-CCNC: 19 U/L (ref 13–40)
CREAT SERPL-MCNC: 0.65 MG/DL (ref 0.5–1.2)
GFR SERPL CREATININE-BSD FRML MDRD: 98.1 ML/MIN/1.73M2
GLUCOSE SERPL-MCNC: 91 MG/DL (ref 73–126)
INR PPP: 0.9 (ref 0.8–1.2)
POTASSIUM SERPL-SCNC: 4.2 MMOL/L (ref 3.5–5.1)

## 2019-06-17 ENCOUNTER — OFFICE VISIT (OUTPATIENT)
Dept: FAMILY MEDICINE | Facility: CLINIC | Age: 59
End: 2019-06-17
Payer: COMMERCIAL

## 2019-06-17 VITALS
BODY MASS INDEX: 34.02 KG/M2 | TEMPERATURE: 98.8 F | HEART RATE: 67 BPM | RESPIRATION RATE: 16 BRPM | OXYGEN SATURATION: 98 % | HEIGHT: 63 IN | WEIGHT: 192 LBS | DIASTOLIC BLOOD PRESSURE: 74 MMHG | SYSTOLIC BLOOD PRESSURE: 136 MMHG

## 2019-06-17 DIAGNOSIS — Z00.00 ROUTINE GENERAL MEDICAL EXAMINATION AT A HEALTH CARE FACILITY: Primary | ICD-10-CM

## 2019-06-17 PROCEDURE — 36415 COLL VENOUS BLD VENIPUNCTURE: CPT | Performed by: FAMILY MEDICINE

## 2019-06-17 PROCEDURE — 99396 PREV VISIT EST AGE 40-64: CPT | Performed by: FAMILY MEDICINE

## 2019-06-17 PROCEDURE — 80061 LIPID PANEL: CPT | Performed by: FAMILY MEDICINE

## 2019-06-17 ASSESSMENT — MIFFLIN-ST. JEOR: SCORE: 1417.1

## 2019-06-17 NOTE — Clinical Note
Pap smear done on this date: 1/1/19 (approximately), by this group: [facility], results were normal.

## 2019-06-17 NOTE — PROGRESS NOTES
SUBJECTIVE:   CC: Theresa M Delosreyes is an 58 year old woman who presents for preventive health visit.     Healthy Habits:    Do you get at least three servings of calcium containing foods daily (dairy, green leafy vegetables, etc.)? yes    Amount of exercise or daily activities, outside of work: 3-4 day(s) per week    Problems taking medications regularly No    Medication side effects: No    Have you had an eye exam in the past two years? no    Do you see a dentist twice per year? yes    Do you have sleep apnea, excessive snoring or daytime drowsiness?yes  Pap smear done on this date: 1/1/19 (approximately), by this group: [facility], results were normal.       Musculoskeletal problem/pain      Duration: 2-3 months     Description  Location: left foot pain       Today's PHQ-2 Score:   PHQ-2 ( 1999 [employer]) 4/23/2019 11/30/2018   Q1: Little interest or pleasure in doing things 0 0   Q2: Feeling down, depressed or hopeless 0 0   PHQ-2 Score 0 0   Q1: Little interest or pleasure in doing things - -   Q2: Feeling down, depressed or hopeless - -   PHQ-2 Score - -       Abuse: Current or Past(Physical, Sexual or Emotional)- No  Do you feel safe in your environment? Yes    Social History     Tobacco Use     Smoking status: Never Smoker     Smokeless tobacco: Never Used   Substance Use Topics     Alcohol use: No     Alcohol/week: 0.0 oz     If you drink alcohol do you typically have >3 drinks per day or >7 drinks per week? No                     Reviewed orders with patient.  Reviewed health maintenance and updated orders accordingly - Yes  BP Readings from Last 3 Encounters:   06/17/19 136/74   04/23/19 138/80   12/14/18 131/69    Wt Readings from Last 3 Encounters:   06/17/19 87.1 kg (192 lb)   04/23/19 88 kg (194 lb)   12/14/18 89.1 kg (196 lb 6.4 oz)                  Patient Active Problem List   Diagnosis     Female stress incontinence     Status post laparoscopic cholecystectomy     Osteoarthritis of  "both knees     Obesity (BMI 30-39.9)     BRIT (obstructive sleep apnea)     Hyperlipidemia LDL goal <130     Hypertension goal BP (blood pressure) < 140/90     Chest pain     Infiltrating ductal carcinoma of right female breast (H)     Antineoplastic antibiotics causing adverse effect in therapeutic use     Breast cancer (H)     Benign essential hypertension     Drugs and medicinal substances causing adverse effect in therapeutic use, initial encounter     Obesity (BMI 35.0-39.9) with comorbidity (H)     Past Surgical History:   Procedure Laterality Date     COLONOSCOPY  5/2006    ormal to lt transverse colon, incomplete due to \"extremely redundant colon\"\"     D & C  9/2004     EXTRACT LENS CLEAR, EXCHANGE LENS REFRACTIVE, COMBINED  3/2013    bilateral     GRAFT FAT TO BREAST Bilateral 11/18/2016    Procedure: GRAFT FAT TO BREAST;  Surgeon: Alhaji Patton MD;  [facility]     GRAFT FAT TO BREAST  12/14/2018    Procedure: GRAFT FAT  FROM ABDOMEN TO BILATERAL  BREAST;  Surgeon: Alhaji Patton MD;  [facility]     GRAFT FREE VASCULARIZED TRANSVERSE RECTUS ABDOMINIS MYOCUTANEOUS Bilateral 4/12/2016    Procedure: GRAFT FREE VASCULARIZED TRANSVERSE RECTUS ABDOMINIS MYOCUTANEOUS;  Surgeon: Alhaji Patton MD;  Location: [facility]     HC ABLATION, ENDOMETRIAL, THERMAL, W/O HYSTEROSCOPIC GUIDANCE  11/2004    NovaSure endometrial ablation.      HC COLON AIR CONTRAST  5/2006    normal     HC SLING OPERATION FOR STRESS INCONTINENCE  1/2007    [facility] sling procedure     INSERT PORT VASCULAR ACCESS Left 11/9/2015    Procedure: INSERT PORT VASCULAR ACCESS;  Surgeon: August Rdz MD;  [facility]     INSERT TISSUE EXPANDER BREAST BILATERAL Bilateral 9/29/2015    Procedure: INSERT TISSUE EXPANDER BREAST BILATERAL;  Surgeon: Alhaji Patton MD;  Location:  OR     LAPAROSCOPIC CHOLECYSTECTOMY WITH CHOLANGIOGRAMS  1/2005    Laparoscopic cholecystectomy with intraoperative  " cholangiogram     MASTECTOMY SIMPLE BILATERAL, SENTINEL NODE BILATERAL, COMBINED Bilateral 9/29/2015    Procedure: COMBINED MASTECTOMY SIMPLE BILATERAL, SENTINEL NODE BILATERAL;  Surgeon: August Rdz MD;  Location:  OR     RECONSTRUCT BREAST BILATERAL Bilateral 11/18/2016    Procedure: RECONSTRUCT BREAST BILATERAL;  Surgeon: Alhaji Patton MD;  [facility]     REMOVE PORT VASCULAR ACCESS N/A 11/18/2016    Procedure: REMOVE PORT VASCULAR ACCESS;  Surgeon: Alhaji Patton MD;  [facility]     REMOVE TISSUE EXPANDER BREAST Bilateral 4/12/2016    Procedure: REMOVE TISSUE EXPANDER BREAST;  Surgeon: Alhaji Patton MD;  Location:  OR     REVISE RECONSTRUCTED BREAST BILATERAL Bilateral 12/14/2018    Procedure: REVISION OF BILATERAL BREAST RECONSTRUCTION ( VASER );  Surgeon: Alhaji Patton MD;  Location: [facility]     REVISE SCAR TRUNK N/A 11/18/2016    Procedure: REVISE SCAR TRUNK;  Surgeon: Alhaji Patton MD;  Location: [facility]     TONSILLECTOMY  2001     VASCULAR SURGERY      insert port and removed       Social History     Tobacco Use     Smoking status: Never Smoker     Smokeless tobacco: Never Used   Substance Use Topics     Alcohol use: No     Alcohol/week: 0.0 oz     Family History   Problem Relation Age of Onset     Asthma Daughter      C.A.D. Father 40        MI     Cerebrovascular Disease Father      Hypertension Mother      Breast Cancer Mother      Thyroid Disease Mother         hypothyroidism     Sleep Apnea Mother      Breast Cancer Sister      Hypertension Sister      Hypertension Brother      Heart Disease Brother         pacemaker     Sleep Apnea Brother      Hypertension Brother      Diabetes Brother         type 2     Sleep Apnea Brother      Myocardial Infarction Maternal Grandmother      Diabetes Paternal Grandmother      Unknown/Adopted Maternal Grandfather      Unknown/Adopted Paternal Grandfather      Diabetes Maternal Aunt       Cancer - colorectal Maternal Uncle      Diabetes Maternal Aunt         with retinopathy leading to blindness         Current Outpatient Medications   Medication Sig Dispense Refill     aspirin 81 MG EC tablet Take 81 mg by mouth daily       candesartan (ATACAND) 32 MG tablet TAKE 1 TABLET BY MOUTH DAILY 90 tablet 1     carvedilol (COREG) 25 MG tablet Take 1 tablet (25 mg) by mouth daily Take one tablet (25 mg) by mouth twice a day with meals 90 tablet 3     Cyanocobalamin (VITAMIN B12 PO) Take by mouth daily       letrozole (FEMARA) 2.5 MG tablet Take 2.5 mg by mouth At Bedtime   0     VITAMIN D, CHOLECALCIFEROL, PO Take 3,000 Units by mouth daily       denosumab (PROLIA) 60 MG/ML SOLN injection Inject 60 mg Subcutaneous every 6 months       Allergies   Allergen Reactions     Definity Other (See Comments)     A substance that helps with imaging the heart muscle with echocardiology    Full body ache starting in the low back.      Keflex [Cephalexin]      Hand swelling, blisters???     Recent Labs   Lab Test 01/28/19  1543 11/30/18  1036 10/04/18  1842 06/01/18  0953 07/03/17  0824  04/03/15  0717 09/11/14  1656   LDL  --   --   --  100* 110*  --  84  --    HDL  --   --   --  54 65  --  62  --    TRIG  --   --   --  126 146  --  119  --    ALT 25  --  32 48 29  --   --   --    CR  --  0.76  --  0.68 0.62   < > 0.64  --    GFRESTIMATED  --  78  --  89 >90  Non  GFR Calc     < > >90  Non  GFR Calc    --    GFRESTBLACK  --  >90  --  >90 >90  African American GFR Calc     < > >90   GFR Calc    --    POTASSIUM  --  4.0  --  4.1 4.0   < > 3.6  --    TSH  --   --   --   --   --   --  2.34 2.42    < > = values in this interval not displayed.          Pertinent mammograms are reviewed under the imaging tab.  History of abnormal Pap smear: NO - age 30- 65 PAP every 3 years recommended     Reviewed and updated as needed this visit by clinical staff         Reviewed and updated as  "needed this visit by Provider        Past Medical History:   Diagnosis Date     Benign hypertension     started medication 3/13     Cancer (H)     breast cancer, right     Chest pain     side effect of Femera     Female stress incontinence 2006    s/p sling procedure     Mixed hyperlipidemia      Motion sickness      Obesity (BMI 30-39.9)      BRIT (obstructive sleep apnea)     not using CPAP, treated with tonsilliectomy     Osteoarthritis of both knees      PONV (postoperative nausea and vomiting)      Status post laparoscopic cholecystectomy 2005    Cholelithiasis with mild subacute hemorrhagic cholecystitis     Supervision of other normal pregnancy      - vaginal     Uncomplicated asthma     with respiratory infections      Past Surgical History:   Procedure Laterality Date     COLONOSCOPY  2006    ormal to lt transverse colon, incomplete due to \"extremely redundant colon\"\"     D & C  2004     EXTRACT LENS CLEAR, EXCHANGE LENS REFRACTIVE, COMBINED  3/2013    bilateral     GRAFT FAT TO BREAST Bilateral 2016    Procedure: GRAFT FAT TO BREAST;  Surgeon: Alhaji Patton MD;  [facility]     GRAFT FAT TO BREAST  2018    Procedure: GRAFT FAT  FROM ABDOMEN TO BILATERAL  BREAST;  Surgeon: Alhaji Patton MD;  [facility]     GRAFT FREE VASCULARIZED TRANSVERSE RECTUS ABDOMINIS MYOCUTANEOUS Bilateral 2016    Procedure: GRAFT FREE VASCULARIZED TRANSVERSE RECTUS ABDOMINIS MYOCUTANEOUS;  Surgeon: Alhaji Patton MD;  Location:  OR     HC ABLATION, ENDOMETRIAL, THERMAL, W/O HYSTEROSCOPIC GUIDANCE  2004    NovaSure endometrial ablation.      HC COLON AIR CONTRAST  2006    normal     HC SLING OPERATION FOR STRESS INCONTINENCE  2007    [facility] sling procedure     INSERT PORT VASCULAR ACCESS Left 2015    Procedure: INSERT PORT VASCULAR ACCESS;  Surgeon: August Rdz MD;  [facility]     INSERT TISSUE EXPANDER BREAST " BILATERAL Bilateral 9/29/2015    Procedure: INSERT TISSUE EXPANDER BREAST BILATERAL;  Surgeon: Alhaji Patton MD;  Location:  OR     LAPAROSCOPIC CHOLECYSTECTOMY WITH CHOLANGIOGRAMS  1/2005    Laparoscopic cholecystectomy with intraoperative  cholangiogram     MASTECTOMY SIMPLE BILATERAL, SENTINEL NODE BILATERAL, COMBINED Bilateral 9/29/2015    Procedure: COMBINED MASTECTOMY SIMPLE BILATERAL, SENTINEL NODE BILATERAL;  Surgeon: August Rdz MD;  Location:  OR     RECONSTRUCT BREAST BILATERAL Bilateral 11/18/2016    Procedure: RECONSTRUCT BREAST BILATERAL;  Surgeon: Alhaji Patton MD;  [facility]     REMOVE PORT VASCULAR ACCESS N/A 11/18/2016    Procedure: REMOVE PORT VASCULAR ACCESS;  Surgeon: Alhaji Patton MD;  [facility]     REMOVE TISSUE EXPANDER BREAST Bilateral 4/12/2016    Procedure: REMOVE TISSUE EXPANDER BREAST;  Surgeon: Alhaji Patton MD;  Location:  OR     REVISE RECONSTRUCTED BREAST BILATERAL Bilateral 12/14/2018    Procedure: REVISION OF BILATERAL BREAST RECONSTRUCTION ( VASER );  Surgeon: Alhaji Patton MD;  Location: [facility]     REVISE SCAR TRUNK N/A 11/18/2016    Procedure: REVISE SCAR TRUNK;  Surgeon: Alhaji Patton MD;  [facility]     TONSILLECTOMY  2001     VASCULAR SURGERY      insert port and removed       ROS:  CONSTITUTIONAL: NEGATIVE for fever, chills, change in weight  INTEGUMENTARY/SKIN: NEGATIVE for worrisome rashes, moles or lesions  EYES: NEGATIVE for vision changes or irritation  ENT: NEGATIVE for ear, mouth and throat problems  RESP: NEGATIVE for significant cough or SOB  BREAST: NEGATIVE for masses, tenderness or discharge  CV: NEGATIVE for chest pain, palpitations or peripheral edema  GI: NEGATIVE for nausea, abdominal pain, heartburn, or change in bowel habits  : NEGATIVE for unusual urinary or vaginal symptoms. No vaginal bleeding.  MUSCULOSKELETAL: NEGATIVE for significant arthralgias or  "myalgia  NEURO: NEGATIVE for weakness, dizziness or paresthesias  PSYCHIATRIC: NEGATIVE for changes in mood or affect     OBJECTIVE:   /74 (Cuff Size: Adult Regular)   Pulse 67   Temp 98.8  F (37.1  C) (Tympanic)   Resp 16   Ht 1.588 m (5' 2.5\")   Wt 87.1 kg (192 lb)   LMP 02/23/2013   SpO2 98%   BMI 34.56 kg/m    EXAM:  GENERAL: healthy, alert and no distress  EYES: Eyes grossly normal to inspection, PERRL and conjunctivae and sclerae normal  HENT: ear canals and TM's normal, nose and mouth without ulcers or lesions  NECK: no adenopathy, no asymmetry, masses, or scars and thyroid normal to palpation  RESP: lungs clear to auscultation - no rales, rhonchi or wheezes  CV: regular rate and rhythm, normal S1 S2, no S3 or S4, no murmur, click or rub, no peripheral edema and peripheral pulses strong  ABDOMEN: soft, nontender, no hepatosplenomegaly, no masses and bowel sounds normal  MS: no gross musculoskeletal defects noted, no edema  SKIN: no suspicious lesions or rashes  NEURO: Normal strength and tone, mentation intact and speech normal        ASSESSMENT/PLAN:       ICD-10-CM    1. Routine general medical examination at a health care facility Z00.00 Lipid panel reflex to direct LDL Fasting       COUNSELING:   Reviewed preventive health counseling, as reflected in patient instructions    Estimated body mass index is 34.56 kg/m  as calculated from the following:    Height as of this encounter: 1.588 m (5' 2.5\").    Weight as of this encounter: 87.1 kg (192 lb).         reports that she has never smoked. She has never used smokeless tobacco.      Counseling Resources:  ATP IV Guidelines  Pooled Cohorts Equation Calculator  Breast Cancer Risk Calculator  FRAX Risk Assessment  ICSI Preventive Guidelines  Dietary Guidelines for Americans, 2010  USDA's MyPlate  ASA Prophylaxis  Lung CA Screening    Dilan Soto MD  [facility] [place]  "

## 2019-06-18 LAB
CHOLEST SERPL-MCNC: 200 MG/DL
HDLC SERPL-MCNC: 67 MG/DL
LDLC SERPL CALC-MCNC: 104 MG/DL
NONHDLC SERPL-MCNC: 133 MG/DL
TRIGL SERPL-MCNC: 146 MG/DL

## 2019-08-21 DIAGNOSIS — I10 BENIGN ESSENTIAL HYPERTENSION: ICD-10-CM

## 2019-08-21 RX ORDER — CARVEDILOL 25 MG/1
TABLET ORAL
Qty: 180 TABLET | Refills: 2 | Status: SHIPPED | OUTPATIENT
Start: 2019-08-21 | End: 2020-06-04

## 2019-08-21 NOTE — TELEPHONE ENCOUNTER
Correction to below - LOV 6/17/19.     Prescription approved per [facility] [place] Refill Protocol.  Odilia Yeh RN   [facility] - Triage

## 2019-08-21 NOTE — TELEPHONE ENCOUNTER
"Requested Prescriptions   Pending Prescriptions Disp Refills     carvedilol (COREG) 25 MG tablet [Pharmacy Med Name: CARVEDILOL 25 MG TABLET] 180 tablet 3     Sig: TAKE ONE TABLET (25 MG) BY MOUTH TWICE A DAY WITH MEALS       Beta-Blockers Protocol Passed - 8/21/2019 12:38 PM        Passed - Blood pressure under 140/90 in past 12 months     BP Readings from Last 3 Encounters:   06/17/19 136/74   04/23/19 138/80   12/14/18 131/69                 Passed - Patient is age 6 or older        Passed - Recent (12 mo) or future (30 days) visit within the authorizing provider's specialty     Patient had office visit in the last 12 months or has a visit in the next 30 days with authorizing provider or within the authorizing provider's specialty.  See \"Patient Info\" tab in inbasket, or \"Choose Columns\" in Meds & Orders section of the refill encounter.              Passed - Medication is active on med list        carvedilol (COREG) 25 MG tablet  Last Written Prescription Date:  11-30-18  Last Fill Quantity: 90,  # refills: 3   Last office visit: 5/19/2017 with prescribing provider:  ARAM Soto   Future Office Visit:      "

## 2019-10-02 ENCOUNTER — HEALTH MAINTENANCE LETTER (OUTPATIENT)
Age: 59
End: 2019-10-02

## 2019-10-20 DIAGNOSIS — I10 BENIGN ESSENTIAL HYPERTENSION: ICD-10-CM

## 2019-10-21 RX ORDER — CANDESARTAN 32 MG/1
TABLET ORAL
Qty: 90 TABLET | Refills: 0 | Status: SHIPPED | OUTPATIENT
Start: 2019-10-21 | End: 2020-05-19

## 2019-10-21 NOTE — TELEPHONE ENCOUNTER
"Requested Prescriptions   Pending Prescriptions Disp Refills     candesartan (ATACAND) 32 MG tablet [Pharmacy Med Name: CANDESARTAN CILEXETIL 32 MG TB] 90 tablet 1     Sig: TAKE 1 TABLET BY MOUTH DAILY       Angiotensin-II Receptors Passed - 10/20/2019  9:07 AM        Passed - Last blood pressure under 140/90 in past 12 months     BP Readings from Last 3 Encounters:   06/17/19 136/74   04/23/19 138/80   12/14/18 131/69                 Passed - Recent (12 mo) or future (30 days) visit within the authorizing provider's specialty     Patient has had an office visit with the authorizing provider or a provider within the authorizing providers department within the previous 12 mos or has a future within next 30 days. See \"Patient Info\" tab in inbasket, or \"Choose Columns\" in Meds & Orders section of the refill encounter.              Passed - Medication is active on med list        Passed - Patient is age 18 or older        Passed - No active pregnancy on record        Passed - Normal serum creatinine on file in past 12 months     Recent Labs   Lab Test 06/07/19   CR 0.65             Passed - Normal serum potassium on file in past 12 months     Recent Labs   Lab Test 06/07/19   POTASSIUM 4.2                    Passed - No positive pregnancy test in past 12 months        Last Written Prescription Date:  4/24/2019  Last Fill Quantity: 90,  # refills: 1   Last office visit: 6/17/2019 with prescribing provider:  6/17/2019   Future Office Visit:      "

## 2019-11-01 NOTE — ANESTHESIA CARE TRANSFER NOTE
November 1, 2019     Patient: Christian Cleary   YOB: 2018   Date of Visit: 2019       To Whom it May Concern:    Christian Cleary was seen in my clinic on 2019 at 11:40 am.     Please excuse Christian's mom's absence from work.  She needed to stay home with her sick child.    Sincerely,         Venita Ledezma MD    Medical information is confidential and cannot be disclosed without the written consent of the patient or her representative.       Patient: Theresa M Delosreyes    Procedure(s):  REVISION OF BILATERAL BREAST RECONSTRUCTION ( VASER )  GRAFT FAT  FROM ABDOMEN TO BILATERAL  BREAST    Diagnosis: HISTORY OF BILATERAL MASTECTOMIES   Diagnosis Additional Information: No value filed.    Anesthesia Type:   General, LMA     Note:  Airway :LMA  Patient transferred to:PACU  Comments: LMA removed shortly after arrival to PACU. Patient resting quietly      Vitals: (Last set prior to Anesthesia Care Transfer)    CRNA VITALS  12/14/2018 0839 - 12/14/2018 0916      12/14/2018             Pulse:  66    SpO2:  99 %    Resp Rate (set):  10                Electronically Signed By: TERE Joya CRNA  December 14, 2018  9:16 AM

## 2019-11-04 ENCOUNTER — OFFICE VISIT (OUTPATIENT)
Dept: FAMILY MEDICINE | Facility: CLINIC | Age: 59
End: 2019-11-04
Payer: COMMERCIAL

## 2019-11-04 VITALS
HEART RATE: 85 BPM | OXYGEN SATURATION: 99 % | WEIGHT: 188 LBS | DIASTOLIC BLOOD PRESSURE: 90 MMHG | HEIGHT: 63 IN | SYSTOLIC BLOOD PRESSURE: 130 MMHG | TEMPERATURE: 98 F | BODY MASS INDEX: 33.31 KG/M2 | RESPIRATION RATE: 14 BRPM

## 2019-11-04 DIAGNOSIS — J02.0 STREPTOCOCCAL SORE THROAT: Primary | ICD-10-CM

## 2019-11-04 LAB
DEPRECATED S PYO AG THROAT QL EIA: ABNORMAL
SPECIMEN SOURCE: ABNORMAL

## 2019-11-04 PROCEDURE — 87880 STREP A ASSAY W/OPTIC: CPT | Performed by: PHYSICIAN ASSISTANT

## 2019-11-04 PROCEDURE — 99213 OFFICE O/P EST LOW 20 MIN: CPT | Performed by: PHYSICIAN ASSISTANT

## 2019-11-04 RX ORDER — PENICILLIN V POTASSIUM 500 MG/1
500 TABLET, FILM COATED ORAL 2 TIMES DAILY
Qty: 20 TABLET | Refills: 0 | Status: SHIPPED | OUTPATIENT
Start: 2019-11-04 | End: 2019-11-14

## 2019-11-04 ASSESSMENT — MIFFLIN-ST. JEOR: SCORE: 1401.89

## 2019-11-04 NOTE — PROGRESS NOTES
"Subjective     Theresa M Delosreyes is a 59 year old female who presents to clinic today for the following health issues:    HPI   RESPIRATORY SYMPTOMS      Duration: 3 days    Description  sore throat, facial pain/pressure, cough, fever, chills and fatigue/malaise    Severity: moderate    Accompanying signs and symptoms:hurts to breath and swallow    History (predisposing factors):  none    Precipitating or alleviating factors: None    Therapies tried and outcome:  Ibuprofen and ricolla drops      3 day hx of sore throat, body aches and chills.  No sick contacts.  No measured fever        Patient Active Problem List   Diagnosis     Female stress incontinence     Status post laparoscopic cholecystectomy     Osteoarthritis of both knees     Obesity (BMI 30-39.9)     BRIT (obstructive sleep apnea)     Hyperlipidemia LDL goal <130     Hypertension goal BP (blood pressure) < 140/90     Chest pain     Infiltrating ductal carcinoma of right female breast (H)     Antineoplastic antibiotics causing adverse effect in therapeutic use     Breast cancer (H)     Benign essential hypertension     Drugs and medicinal substances causing adverse effect in therapeutic use, initial encounter     Obesity (BMI 35.0-39.9) with comorbidity (H)     Past Surgical History:   Procedure Laterality Date     COLONOSCOPY  5/2006    ormal to lt transverse colon, incomplete due to \"extremely redundant colon\"\"     D & C  9/2004     EXTRACT LENS CLEAR, EXCHANGE LENS REFRACTIVE, COMBINED  3/2013    bilateral     GRAFT FAT TO BREAST Bilateral 11/18/2016    Procedure: GRAFT FAT TO BREAST;  Surgeon: Alhaji Patton MD;  Location: [facility]     GRAFT FAT TO BREAST  12/14/2018    Procedure: GRAFT FAT  FROM ABDOMEN TO BILATERAL  BREAST;  Surgeon: Alhaji Patton MD;  Location: [facility]     GRAFT FREE VASCULARIZED TRANSVERSE RECTUS ABDOMINIS MYOCUTANEOUS Bilateral 4/12/2016    Procedure: GRAFT FREE VASCULARIZED TRANSVERSE RECTUS ABDOMINIS " MYOCUTANEOUS;  Surgeon: Alhaji Patton MD;  Location:  OR     HC ABLATION, ENDOMETRIAL, THERMAL, W/O HYSTEROSCOPIC GUIDANCE  11/2004    NovaSure endometrial ablation.      HC COLON AIR CONTRAST  5/2006    normal     HC SLING OPERATION FOR STRESS INCONTINENCE  1/2007    [facility] sling procedure     INSERT PORT VASCULAR ACCESS Left 11/9/2015    Procedure: INSERT PORT VASCULAR ACCESS;  Surgeon: August Rdz MD;  [facility]     INSERT TISSUE EXPANDER BREAST BILATERAL Bilateral 9/29/2015    Procedure: INSERT TISSUE EXPANDER BREAST BILATERAL;  Surgeon: Alhaji Patton MD;  Location:  OR     LAPAROSCOPIC CHOLECYSTECTOMY WITH CHOLANGIOGRAMS  1/2005    Laparoscopic cholecystectomy with intraoperative  cholangiogram     MASTECTOMY SIMPLE BILATERAL, SENTINEL NODE BILATERAL, COMBINED Bilateral 9/29/2015    Procedure: COMBINED MASTECTOMY SIMPLE BILATERAL, SENTINEL NODE BILATERAL;  Surgeon: August Rdz MD;  Location:  OR     RECONSTRUCT BREAST BILATERAL Bilateral 11/18/2016    Procedure: RECONSTRUCT BREAST BILATERAL;  Surgeon: Alhaji Patton MD;  [facility]     REMOVE PORT VASCULAR ACCESS N/A 11/18/2016    Procedure: REMOVE PORT VASCULAR ACCESS;  Surgeon: Alhaji Patton MD;  [facility]     REMOVE TISSUE EXPANDER BREAST Bilateral 4/12/2016    Procedure: REMOVE TISSUE EXPANDER BREAST;  Surgeon: Alhaji Patton MD;  Location:  OR     REVISE RECONSTRUCTED BREAST BILATERAL Bilateral 12/14/2018    Procedure: REVISION OF BILATERAL BREAST RECONSTRUCTION ( VASER );  Surgeon: Alhaji Patton MD;  [facility]     REVISE SCAR TRUNK N/A 11/18/2016    Procedure: REVISE SCAR TRUNK;  Surgeon: Alhaji Patton MD;  [facility]     TONSILLECTOMY  2001     VASCULAR SURGERY      insert port and removed       Social History     Tobacco Use     Smoking status: Never Smoker     Smokeless tobacco: Never Used   Substance Use Topics      Alcohol use: No     Alcohol/week: 0.0 standard drinks     Family History   Problem Relation Age of Onset     Asthma Daughter      C.A.D. Father 40        MI     Cerebrovascular Disease Father      Hypertension Mother      Breast Cancer Mother      Thyroid Disease Mother         hypothyroidism     Sleep Apnea Mother      Breast Cancer Sister      Hypertension Sister      Hypertension Brother      Heart Disease Brother         pacemaker     Sleep Apnea Brother      Hypertension Brother      Diabetes Brother         type 2     Sleep Apnea Brother      Myocardial Infarction Maternal Grandmother      Diabetes Paternal Grandmother      Unknown/Adopted Maternal Grandfather      Unknown/Adopted Paternal Grandfather      Diabetes Maternal Aunt      Cancer - colorectal Maternal Uncle      Diabetes Maternal Aunt         with retinopathy leading to blindness         Current Outpatient Medications   Medication Sig Dispense Refill     aspirin 81 MG EC tablet Take 81 mg by mouth daily       candesartan (ATACAND) 32 MG tablet TAKE 1 TABLET BY MOUTH DAILY 90 tablet 0     carvedilol (COREG) 25 MG tablet TAKE ONE TABLET (25 MG) BY MOUTH TWICE A DAY WITH MEALS 180 tablet 2     carvedilol (COREG) 25 MG tablet Take 1 tablet (25 mg) by mouth daily Take one tablet (25 mg) by mouth twice a day with meals 90 tablet 3     Cyanocobalamin (VITAMIN B12 PO) Take by mouth daily       denosumab (PROLIA) 60 MG/ML SOLN injection Inject 60 mg Subcutaneous every 6 months       letrozole (FEMARA) 2.5 MG tablet Take 2.5 mg by mouth At Bedtime   0     penicillin V (VEETID) 500 MG tablet Take 1 tablet (500 mg) by mouth 2 times daily for 10 days 20 tablet 0     VITAMIN D, CHOLECALCIFEROL, PO Take 3,000 Units by mouth daily       Allergies   Allergen Reactions     Definity Other (See Comments)     A substance that helps with imaging the heart muscle with echocardiology    Full body ache starting in the low back.      Keflex [Cephalexin]      Hand swelling,  "blisters???         Reviewed and updated as needed this visit by Provider         Review of Systems   ROS COMP: Constitutional, HEENT, cardiovascular, pulmonary, gi and gu systems are negative, except as otherwise noted.      Objective    BP (!) 130/90   Pulse 85   Temp 98  F (36.7  C) (Tympanic)   Resp 14   Ht 1.6 m (5' 3\")   Wt 85.3 kg (188 lb)   LMP 02/23/2013   SpO2 99%   BMI 33.30 kg/m    Body mass index is 33.3 kg/m .  Physical Exam   GENERAL: healthy, alert and no distress  EYES: Eyes grossly normal to inspection, PERRL and conjunctivae and sclerae normal  HENT: ear canals and TM's normal, nose and mouth without ulcers or lesions, posterior oropharynx is erythematous, tonsils and uvula absent   NECK: no adenopathy  RESP: lungs clear to auscultation - no rales, rhonchi or wheezes  CV: regular rate and rhythm, normal S1 S2, no S3 or S4, no murmur, click or rub, no peripheral edema     Diagnostic Test Results:  Results for orders placed or performed in visit on 11/04/19 (from the past 24 hour(s))   Strep, Rapid Screen   Result Value Ref Range    Specimen Description Throat     Rapid Strep A Screen (A)      POSITIVE: Group A Streptococcal antigen detected by immunoassay.           Assessment & Plan     1. Streptococcal sore throat  Strep positive today.  Will begin penicillin x 10 days.  Supportive care instructions discussed and understood by patient.   - Strep, Rapid Screen  - penicillin V (VEETID) 500 MG tablet; Take 1 tablet (500 mg) by mouth 2 times daily for 10 days  Dispense: 20 tablet; Refill: 0     BMI:   Estimated body mass index is 33.3 kg/m  as calculated from the following:    Height as of this encounter: 1.6 m (5' 3\").    Weight as of this encounter: 85.3 kg (188 lb).           Follow up if new or worsening symptoms    No follow-ups on file.    Brian Wilhelm PA-C  [facility] [place]      "

## 2019-11-12 ENCOUNTER — OFFICE VISIT (OUTPATIENT)
Dept: FAMILY MEDICINE | Facility: CLINIC | Age: 59
End: 2019-11-12
Payer: COMMERCIAL

## 2019-11-12 VITALS — WEIGHT: 186 LBS | OXYGEN SATURATION: 100 % | BODY MASS INDEX: 32.95 KG/M2 | TEMPERATURE: 99.5 F | HEART RATE: 67 BPM

## 2019-11-12 DIAGNOSIS — B02.29 PHN (POSTHERPETIC NEURALGIA): Primary | ICD-10-CM

## 2019-11-12 DIAGNOSIS — R82.90 FOUL SMELLING URINE: ICD-10-CM

## 2019-11-12 LAB

## 2019-11-12 PROCEDURE — 81001 URINALYSIS AUTO W/SCOPE: CPT | Performed by: FAMILY MEDICINE

## 2019-11-12 PROCEDURE — 99214 OFFICE O/P EST MOD 30 MIN: CPT | Performed by: FAMILY MEDICINE

## 2019-11-12 RX ORDER — GABAPENTIN 100 MG/1
100 CAPSULE ORAL 2 TIMES DAILY
Qty: 20 CAPSULE | Refills: 0 | Status: SHIPPED | OUTPATIENT
Start: 2019-11-12 | End: 2022-10-14

## 2019-11-12 NOTE — PROGRESS NOTES
"Subjective     Theresa M Delosreyes is a 59 year old female who presents to clinic today for the following health issues:    HPI   Concern - f/u on shingles   Onset:  Last week     Description:    pt is still having pain from shingles on left arm     Intensity: mild    Progression of Symptoms:  waxing and waning    Accompanying Signs & Symptoms:      Previous history of similar problem:       Precipitating factors:   Worsened by:     Alleviating factors:  Improved by:     Therapies Tried and outcome:     Patient Active Problem List   Diagnosis     Female stress incontinence     Status post laparoscopic cholecystectomy     Osteoarthritis of both knees     Obesity (BMI 30-39.9)     BRIT (obstructive sleep apnea)     Hyperlipidemia LDL goal <130     Hypertension goal BP (blood pressure) < 140/90     Chest pain     Infiltrating ductal carcinoma of right female breast (H)     Antineoplastic antibiotics causing adverse effect in therapeutic use     Breast cancer (H)     Benign essential hypertension     Drugs and medicinal substances causing adverse effect in therapeutic use, initial encounter     Obesity (BMI 35.0-39.9) with comorbidity (H)     Past Surgical History:   Procedure Laterality Date     COLONOSCOPY  5/2006    ormal to lt transverse colon, incomplete due to \"extremely redundant colon\"\"     D & C  9/2004     EXTRACT LENS CLEAR, EXCHANGE LENS REFRACTIVE, COMBINED  3/2013    bilateral     GRAFT FAT TO BREAST Bilateral 11/18/2016    Procedure: GRAFT FAT TO BREAST;  Surgeon: Alhaji Patton MD;  [facility]     GRAFT FAT TO BREAST  12/14/2018    Procedure: GRAFT FAT  FROM ABDOMEN TO BILATERAL  BREAST;  Surgeon: Alhaji Patton MD;  [facility]     GRAFT FREE VASCULARIZED TRANSVERSE RECTUS ABDOMINIS MYOCUTANEOUS Bilateral 4/12/2016    Procedure: GRAFT FREE VASCULARIZED TRANSVERSE RECTUS ABDOMINIS MYOCUTANEOUS;  Surgeon: Alhaji Patton MD;  Location: [facility]     HC ABLATION, " ENDOMETRIAL, THERMAL, W/O HYSTEROSCOPIC GUIDANCE  11/2004    NovaSure endometrial ablation.      HC COLON AIR CONTRAST  5/2006    normal     HC SLING OPERATION FOR STRESS INCONTINENCE  1/2007    [facility] sling procedure     INSERT PORT VASCULAR ACCESS Left 11/9/2015    Procedure: INSERT PORT VASCULAR ACCESS;  Surgeon: August Rdz MD;  Location: [facility]     INSERT TISSUE EXPANDER BREAST BILATERAL Bilateral 9/29/2015    Procedure: INSERT TISSUE EXPANDER BREAST BILATERAL;  Surgeon: Alhaji Patton MD;  Location:  OR     LAPAROSCOPIC CHOLECYSTECTOMY WITH CHOLANGIOGRAMS  1/2005    Laparoscopic cholecystectomy with intraoperative  cholangiogram     MASTECTOMY SIMPLE BILATERAL, SENTINEL NODE BILATERAL, COMBINED Bilateral 9/29/2015    Procedure: COMBINED MASTECTOMY SIMPLE BILATERAL, SENTINEL NODE BILATERAL;  Surgeon: August Rdz MD;  Location:  OR     RECONSTRUCT BREAST BILATERAL Bilateral 11/18/2016    Procedure: RECONSTRUCT BREAST BILATERAL;  Surgeon: Alhaji Patton MD;  [facility]     REMOVE PORT VASCULAR ACCESS N/A 11/18/2016    Procedure: REMOVE PORT VASCULAR ACCESS;  Surgeon: Alhaji Patton MD;  [facility]     REMOVE TISSUE EXPANDER BREAST Bilateral 4/12/2016    Procedure: REMOVE TISSUE EXPANDER BREAST;  Surgeon: Alhaji Patton MD;  Location:  OR     REVISE RECONSTRUCTED BREAST BILATERAL Bilateral 12/14/2018    Procedure: REVISION OF BILATERAL BREAST RECONSTRUCTION ( VASER );  Surgeon: Alhaji Patton MD;  Location: [facility]     REVISE SCAR TRUNK N/A 11/18/2016    Procedure: REVISE SCAR TRUNK;  Surgeon: Alhaji Patton MD;  Location: [facility]     TONSILLECTOMY  2001     VASCULAR SURGERY      insert port and removed       Social History     Tobacco Use     Smoking status: Never Smoker     Smokeless tobacco: Never Used   Substance Use Topics     Alcohol use: No     Alcohol/week: 0.0 standard drinks     Family History   Problem Relation  Age of Onset     Asthma Daughter      C.A.D. Father 40        MI     Cerebrovascular Disease Father      Hypertension Mother      Breast Cancer Mother      Thyroid Disease Mother         hypothyroidism     Sleep Apnea Mother      Breast Cancer Sister      Hypertension Sister      Hypertension Brother      Heart Disease Brother         pacemaker     Sleep Apnea Brother      Hypertension Brother      Diabetes Brother         type 2     Sleep Apnea Brother      Myocardial Infarction Maternal Grandmother      Diabetes Paternal Grandmother      Unknown/Adopted Maternal Grandfather      Unknown/Adopted Paternal Grandfather      Diabetes Maternal Aunt      Cancer - colorectal Maternal Uncle      Diabetes Maternal Aunt         with retinopathy leading to blindness         Current Outpatient Medications   Medication Sig Dispense Refill     aspirin 81 MG EC tablet Take 81 mg by mouth daily       candesartan (ATACAND) 32 MG tablet TAKE 1 TABLET BY MOUTH DAILY 90 tablet 0     carvedilol (COREG) 25 MG tablet TAKE ONE TABLET (25 MG) BY MOUTH TWICE A DAY WITH MEALS 180 tablet 2     carvedilol (COREG) 25 MG tablet Take 1 tablet (25 mg) by mouth daily Take one tablet (25 mg) by mouth twice a day with meals 90 tablet 3     Cyanocobalamin (VITAMIN B12 PO) Take by mouth daily       denosumab (PROLIA) 60 MG/ML SOLN injection Inject 60 mg Subcutaneous every 6 months       gabapentin (NEURONTIN) 100 MG capsule Take 1 capsule (100 mg) by mouth 2 times daily 20 capsule 0     letrozole (FEMARA) 2.5 MG tablet Take 2.5 mg by mouth At Bedtime   0     penicillin V (VEETID) 500 MG tablet Take 1 tablet (500 mg) by mouth 2 times daily for 10 days 20 tablet 0     valACYclovir (VALTREX) 1000 mg tablet Take 1 tablet (1,000 mg) by mouth 2 times daily for 10 days 20 tablet 0     VITAMIN D, CHOLECALCIFEROL, PO Take 3,000 Units by mouth daily       Allergies   Allergen Reactions     Definity Other (See Comments)     A substance that helps with imaging the  heart muscle with echocardiology    Full body ache starting in the low back.      Keflex [Cephalexin]      Hand swelling, blisters???     Recent Labs   Lab Test 06/17/19  1051 06/07/19 01/28/19  1543 11/30/18  1036 10/04/18  1842 06/01/18  0953 07/03/17  0824  04/03/15  0717 09/11/14  1656   *  --   --   --   --  100* 110*  --  84  --    HDL 67  --   --   --   --  54 65  --  62  --    TRIG 146  --   --   --   --  126 146  --  119  --    ALT  --  25 25  --  32 48 29  --   --   --    CR  --  0.65  --  0.76  --  0.68 0.62   < > 0.64  --    GFRESTIMATED  --  98.1  --  78  --  89 >90  Non  GFR Calc     < > >90  Non  GFR Calc    --    GFRESTBLACK  --   --   --  >90  --  >90 >90  African American GFR Calc     < > >90   GFR Calc    --    POTASSIUM  --  4.2  --  4.0  --  4.1 4.0   < > 3.6  --    TSH  --   --   --   --   --   --   --   --  2.34 2.42    < > = values in this interval not displayed.      BP Readings from Last 3 Encounters:   11/04/19 (!) 130/90   06/17/19 136/74   04/23/19 138/80    Wt Readings from Last 3 Encounters:   11/12/19 84.4 kg (186 lb)   11/04/19 85.3 kg (188 lb)   06/17/19 87.1 kg (192 lb)                    Reviewed and updated as needed this visit by Provider         Review of Systems   ROS COMP: Constitutional, HEENT, cardiovascular, pulmonary, gi and gu systems are negative, except as otherwise noted.      Objective    Pulse 67   Temp 99.5  F (37.5  C) (Tympanic)   Wt 84.4 kg (186 lb)   LMP 02/23/2013   SpO2 100%   BMI 32.95 kg/m    Body mass index is 32.95 kg/m .  Physical Exam   GENERAL: healthy, alert and no distress  EYES: Eyes grossly normal to inspection, PERRL and conjunctivae and sclerae normal  HENT: ear canals and TM's normal, nose and mouth without ulcers or lesions  NECK: no adenopathy, no asymmetry, masses, or scars and thyroid normal to palpation  RESP: lungs clear to auscultation - no rales, rhonchi or wheezes  CV: regular  "rate and rhythm, normal S1 S2, no S3 or S4, no murmur, click or rub, no peripheral edema and peripheral pulses strong  ABDOMEN: soft, nontender, no hepatosplenomegaly, no masses and bowel sounds normal  MS: no gross musculoskeletal defects noted, no edema  SKIN: no suspicious lesions or rashes  NEURO: Normal strength and tone, mentation intact and speech normal  BACK: no CVA tenderness, no paralumbar tenderness  PSYCH: mentation appears normal, affect normal/bright  LYMPH: no cervical, supraclavicular, axillary, or inguinal adenopathy            Assessment & Plan       ICD-10-CM    1. PHN (postherpetic neuralgia) B02.29 gabapentin (NEURONTIN) 100 MG capsule   2. Foul smelling urine R82.90 *UA reflex to Microscopic and Culture ([place] [facility] (except [place] and [place])     Her active lesion were improved and formed scabs, has PHN, will treat with gabapentin   Her urine showed no finding of infection ,      BMI:   Estimated body mass index is 32.95 kg/m  as calculated from the following:    Height as of 11/4/19: 1.6 m (5' 3\").    Weight as of this encounter: 84.4 kg (186 lb).           FUTURE APPOINTMENTS:       - Follow-up visit as needed     No follow-ups on file.    Dilan Soto MD  [facility] [place]      "

## 2019-12-09 DIAGNOSIS — I10 BENIGN ESSENTIAL HYPERTENSION: ICD-10-CM

## 2019-12-10 RX ORDER — CANDESARTAN 32 MG/1
TABLET ORAL
Qty: 90 TABLET | Refills: 0 | OUTPATIENT
Start: 2019-12-10

## 2019-12-10 NOTE — TELEPHONE ENCOUNTER
"Requested Prescriptions   Pending Prescriptions Disp Refills     candesartan (ATACAND) 32 MG tablet [Pharmacy Med Name: CANDESARTAN CILEXETIL 32 MG TB] 90 tablet 0     Sig: TAKE 1 TABLET BY MOUTH DAILY       Angiotensin-II Receptors Failed - 12/9/2019  7:51 PM        Failed - Last blood pressure under 140/90 in past 12 months     BP Readings from Last 3 Encounters:   11/04/19 (!) 130/90   06/17/19 136/74   04/23/19 138/80                 Passed - Recent (12 mo) or future (30 days) visit within the authorizing provider's specialty     Patient has had an office visit with the authorizing provider or a provider within the authorizing providers department within the previous 12 mos or has a future within next 30 days. See \"Patient Info\" tab in inbasket, or \"Choose Columns\" in Meds & Orders section of the refill encounter.              Passed - Medication is active on med list        Passed - Patient is age 18 or older        Passed - No active pregnancy on record        Passed - Normal serum creatinine on file in past 12 months     Recent Labs   Lab Test 06/07/19   CR 0.65             Passed - Normal serum potassium on file in past 12 months     Recent Labs   Lab Test 06/07/19   POTASSIUM 4.2                    Passed - No positive pregnancy test in past 12 months        Last Written Prescription Date:  10/21/2019  Last Fill Quantity: ,  90# refills:  0  Last office visit: 11/12/2019 with prescribing provider:     Future Office Visit:        "

## 2019-12-13 ENCOUNTER — TRANSFERRED RECORDS (OUTPATIENT)
Dept: HEALTH INFORMATION MANAGEMENT | Facility: CLINIC | Age: 59
End: 2019-12-13

## 2020-03-18 ENCOUNTER — NURSE TRIAGE (OUTPATIENT)
Dept: FAMILY MEDICINE | Facility: CLINIC | Age: 60
End: 2020-03-18

## 2020-03-18 NOTE — TELEPHONE ENCOUNTER
Patient calling to report right arm pain that comes and goes over the last week. Pain level at time of call 3-4/10.  At times she states that it is severe, but it is there all the time.   Sometimes so severe it takes her breath away.   States that it feels like a pinched nerve. States that she can feel some pain in her right shoulder when she turns her neck. States that when lower arm pain is severe she feels it in her right shoulder blade.  Huddled with Dr. Soto regarding symptoms. Advised if severe pain returns patient should go to ER. If pain not severe can be seen in clinic tomorrow.    Patient scheduled for 9 am. No respiratory symptoms or fever, no travel, no Covid 19 contact. Sarah Zimmer RN

## 2020-03-18 NOTE — TELEPHONE ENCOUNTER
"  Additional Information    Negative: Shock suspected (e.g., cold/pale/clammy skin, too weak to stand, low BP, rapid pulse)    Negative: Similar pain previously and it was from 'heart attack'    Negative: Similar pain previously from 'angina' and not relieved by nitroglycerin    Negative: Sounds like a life-threatening emergency to the triager    Negative: Followed an injury to arm    Negative: Chest pain    Negative: Wound looks infected    Negative: Elbow pain is the main symptom    Negative: Hand or wrist pain is the main symptom    Negative: Difficulty breathing or unusual sweating (e.g., sweating without exertion)    Negative: Chest pain lasting longer than 5 minutes    Negative: Fever and red area (or area very tender to touch)    Negative: Fever and swollen joint    Negative: Entire arm is swollen    Negative: Patient sounds very sick or weak to the triager    Negative: Weakness (i.e., loss of strength) in hand or fingers    Negative: Arm pains with exertion (e.g., occurs with walking; goes away on resting)    Negative: Cast on wrist or arm and now increasing pain    Negative: Red area or streak and large (> 2 in. or 5 cm)    Negative: SEVERE pain (e.g., excruciating, unable to do any normal activities)    Answer Assessment - Initial Assessment Questions  1. ONSET: \"When did the pain start?\"      1 week ago  2. LOCATION: \"Where is the pain located?\"      Lower right arm, top of arm, below elbow  3. PAIN: \"How bad is the pain?\" (Scale 1-10; or mild, moderate, severe)    - MILD (1-3): doesn't interfere with normal activities    - MODERATE (4-7): interferes with normal activities (e.g., work or school) or awakens from sleep    - SEVERE (8-10): excruciating pain, unable to do any normal activities, unable to hold a cup of water      Can be severe when it comes. Comes and goes.   4. WORK OR EXERCISE: \"Has there been any recent work or exercise that involved this part of the body?\"      no  5. CAUSE: \"What do you " "think is causing the arm pain?\"      Pinched nerve  6. OTHER SYMPTOMS: \"Do you have any other symptoms?\" (e.g., neck pain, swelling, rash, fever, numbness, weakness)      A little bit of neck pain. Denies numbness or weaknes  7. PREGNANCY: \"Is there any chance you are pregnant?\" \"When was your last menstrual period?\"      no    Protocols used: ARM PAIN-A-[place]    "

## 2020-03-18 NOTE — TELEPHONE ENCOUNTER
Please have her to keep monitoring, and if sx worsening, need to go to  ER  Otherwise,  I can see her tomorrow at the clinic

## 2020-03-18 NOTE — TELEPHONE ENCOUNTER
Reason for call:  Patient reporting a symptom    Symptom or request: Right arm pain     Duration (how long have symptoms been present): x1 weeks     Have you been treated for this before? No    Additional comments: Melina states she thinks she may have pinched a nerve. Has radiating pain in her arm for over a week now.     Phone Number patient can be reached at:  Home number on file [phone] (home)    Best Time:  Anytime     Can we leave a detailed message on this number:  YES    Call taken on 3/18/2020 at 3:02 PM by Jenna Jerez

## 2020-03-19 ENCOUNTER — OFFICE VISIT (OUTPATIENT)
Dept: FAMILY MEDICINE | Facility: CLINIC | Age: 60
End: 2020-03-19
Payer: COMMERCIAL

## 2020-03-19 VITALS
HEART RATE: 65 BPM | BODY MASS INDEX: 33.66 KG/M2 | SYSTOLIC BLOOD PRESSURE: 138 MMHG | WEIGHT: 190 LBS | TEMPERATURE: 98.6 F | DIASTOLIC BLOOD PRESSURE: 82 MMHG | OXYGEN SATURATION: 96 %

## 2020-03-19 DIAGNOSIS — M77.11 LATERAL EPICONDYLITIS OF RIGHT ELBOW: Primary | ICD-10-CM

## 2020-03-19 PROCEDURE — 99213 OFFICE O/P EST LOW 20 MIN: CPT | Performed by: FAMILY MEDICINE

## 2020-03-19 NOTE — PROGRESS NOTES
"Subjective     Theresa M Delosreyes is a 59 year old female who presents to clinic today for the following health issues:    HPI   Joint Pain    Onset: one week ago     Description:   Location: right shoulder and right elbow  Character: Sharp    Intensity: moderate    Progression of Symptoms: worse    Accompanying Signs & Symptoms:  Other symptoms: radiation of pain to to the right shoulder blade     History:   Previous similar pain: no       Precipitating factors:   Trauma or overuse: no     Alleviating factors:  Improved by:     Therapies Tried and outcome: ibuprofen        Patient Active Problem List   Diagnosis     Female stress incontinence     Status post laparoscopic cholecystectomy     Osteoarthritis of both knees     Obesity (BMI 30-39.9)     BRIT (obstructive sleep apnea)     Hyperlipidemia LDL goal <130     Hypertension goal BP (blood pressure) < 140/90     Chest pain     Infiltrating ductal carcinoma of right female breast (H)     Antineoplastic antibiotics causing adverse effect in therapeutic use     Breast cancer (H)     Benign essential hypertension     Drugs and medicinal substances causing adverse effect in therapeutic use, initial encounter     Obesity (BMI 35.0-39.9) with comorbidity (H)     Past Surgical History:   Procedure Laterality Date     COLONOSCOPY  5/2006    ormal to lt transverse colon, incomplete due to \"extremely redundant colon\"\"     D & C  9/2004     EXTRACT LENS CLEAR, EXCHANGE LENS REFRACTIVE, COMBINED  3/2013    bilateral     GRAFT FAT TO BREAST Bilateral 11/18/2016    Procedure: GRAFT FAT TO BREAST;  Surgeon: Alhaji Patton MD;  Location: [facility]     GRAFT FAT TO BREAST  12/14/2018    Procedure: GRAFT FAT  FROM ABDOMEN TO BILATERAL  BREAST;  Surgeon: Alhaji Patton MD;  Location: [facility]     GRAFT FREE VASCULARIZED TRANSVERSE RECTUS ABDOMINIS MYOCUTANEOUS Bilateral 4/12/2016    Procedure: GRAFT FREE VASCULARIZED TRANSVERSE RECTUS ABDOMINIS MYOCUTANEOUS;  " Surgeon: Alhaji Patton MD;  Location:  OR     HC ABLATION, ENDOMETRIAL, THERMAL, W/O HYSTEROSCOPIC GUIDANCE  11/2004    NovaSure endometrial ablation.      HC COLON AIR CONTRAST  5/2006    normal     HC SLING OPERATION FOR STRESS INCONTINENCE  1/2007    [facility] sling procedure     INSERT PORT VASCULAR ACCESS Left 11/9/2015    Procedure: INSERT PORT VASCULAR ACCESS;  Surgeon: August Rdz MD;  [facility]     INSERT TISSUE EXPANDER BREAST BILATERAL Bilateral 9/29/2015    Procedure: INSERT TISSUE EXPANDER BREAST BILATERAL;  Surgeon: Alhaji Patton MD;  Location:  OR     LAPAROSCOPIC CHOLECYSTECTOMY WITH CHOLANGIOGRAMS  1/2005    Laparoscopic cholecystectomy with intraoperative  cholangiogram     MASTECTOMY SIMPLE BILATERAL, SENTINEL NODE BILATERAL, COMBINED Bilateral 9/29/2015    Procedure: COMBINED MASTECTOMY SIMPLE BILATERAL, SENTINEL NODE BILATERAL;  Surgeon: August Rdz MD;  Location:  OR     RECONSTRUCT BREAST BILATERAL Bilateral 11/18/2016    Procedure: RECONSTRUCT BREAST BILATERAL;  Surgeon: Alhaji Patton MD;  [facility]     REMOVE PORT VASCULAR ACCESS N/A 11/18/2016    Procedure: REMOVE PORT VASCULAR ACCESS;  Surgeon: Alhaji Patton MD;  [facility]     REMOVE TISSUE EXPANDER BREAST Bilateral 4/12/2016    Procedure: REMOVE TISSUE EXPANDER BREAST;  Surgeon: Alhaji Patton MD;  Location:  OR     REVISE RECONSTRUCTED BREAST BILATERAL Bilateral 12/14/2018    Procedure: REVISION OF BILATERAL BREAST RECONSTRUCTION ( VASER );  Surgeon: Alhaji Patton MD;  [facility]     REVISE SCAR TRUNK N/A 11/18/2016    Procedure: REVISE SCAR TRUNK;  Surgeon: Alhaji Patton MD;  [facility]:  [facility]     TONSILLECTOMY  2001     VASCULAR SURGERY      insert port and removed       Social History     Tobacco Use     Smoking status: Never Smoker     Smokeless tobacco: Never Used   Substance Use Topics     Alcohol use: No      Alcohol/week: 0.0 standard drinks     Family History   Problem Relation Age of Onset     Asthma Daughter      C.A.D. Father 40        MI     Cerebrovascular Disease Father      Hypertension Mother      Breast Cancer Mother      Thyroid Disease Mother         hypothyroidism     Sleep Apnea Mother      Breast Cancer Sister      Hypertension Sister      Hypertension Brother      Heart Disease Brother         pacemaker     Sleep Apnea Brother      Hypertension Brother      Diabetes Brother         type 2     Sleep Apnea Brother      Myocardial Infarction Maternal Grandmother      Diabetes Paternal Grandmother      Unknown/Adopted Maternal Grandfather      Unknown/Adopted Paternal Grandfather      Diabetes Maternal Aunt      Cancer - colorectal Maternal Uncle      Diabetes Maternal Aunt         with retinopathy leading to blindness         Current Outpatient Medications   Medication Sig Dispense Refill     aspirin 81 MG EC tablet Take 81 mg by mouth daily       candesartan (ATACAND) 32 MG tablet TAKE 1 TABLET BY MOUTH DAILY 90 tablet 0     carvedilol (COREG) 25 MG tablet TAKE ONE TABLET (25 MG) BY MOUTH TWICE A DAY WITH MEALS 180 tablet 2     carvedilol (COREG) 25 MG tablet Take 1 tablet (25 mg) by mouth daily Take one tablet (25 mg) by mouth twice a day with meals 90 tablet 3     Cyanocobalamin (VITAMIN B12 PO) Take by mouth daily       denosumab (PROLIA) 60 MG/ML SOLN injection Inject 60 mg Subcutaneous every 6 months       gabapentin (NEURONTIN) 100 MG capsule Take 1 capsule (100 mg) by mouth 2 times daily 20 capsule 0     letrozole (FEMARA) 2.5 MG tablet Take 2.5 mg by mouth At Bedtime   0     VITAMIN D, CHOLECALCIFEROL, PO Take 3,000 Units by mouth daily       valACYclovir (VALTREX) 1000 mg tablet Take 1 tablet (1,000 mg) by mouth 2 times daily for 10 days 20 tablet 0     Allergies   Allergen Reactions     Definity Other (See Comments)     A substance that helps with imaging the heart muscle with  echocardiology    Full body ache starting in the low back.      Keflex [Cephalexin]      Hand swelling, blisters???     Recent Labs   Lab Test 06/17/19  1051 06/07/19 01/28/19  1543 11/30/18  1036 10/04/18  1842 06/01/18  0953 07/03/17  0824  04/03/15  0717 09/11/14  1656   *  --   --   --   --  100* 110*  --  84  --    HDL 67  --   --   --   --  54 65  --  62  --    TRIG 146  --   --   --   --  126 146  --  119  --    ALT  --  25 25  --  32 48 29  --   --   --    CR  --  0.65  --  0.76  --  0.68 0.62   < > 0.64  --    GFRESTIMATED  --  98.1  --  78  --  89 >90  Non  GFR Calc     < > >90  Non  GFR Calc    --    GFRESTBLACK  --   --   --  >90  --  >90 >90  African American GFR Calc     < > >90   GFR Calc    --    POTASSIUM  --  4.2  --  4.0  --  4.1 4.0   < > 3.6  --    TSH  --   --   --   --   --   --   --   --  2.34 2.42    < > = values in this interval not displayed.      BP Readings from Last 3 Encounters:   03/19/20 138/82   11/04/19 (!) 130/90   06/17/19 136/74    Wt Readings from Last 3 Encounters:   03/19/20 86.2 kg (190 lb)   11/12/19 84.4 kg (186 lb)   11/04/19 85.3 kg (188 lb)                    Reviewed and updated as needed this visit by Provider         Review of Systems   ROS COMP: Constitutional, HEENT, cardiovascular, pulmonary, gi and gu systems are negative, except as otherwise noted.      Objective    /82   Pulse 65   Temp 98.6  F (37  C) (Oral)   Wt 86.2 kg (190 lb)   LMP 02/23/2013   SpO2 96%   BMI 33.66 kg/m    Body mass index is 33.66 kg/m .  Physical Exam   GENERAL: healthy, alert and no distress  NECK: no adenopathy, no asymmetry, masses, or scars and thyroid normal to palpation  RESP: lungs clear to auscultation - no rales, rhonchi or wheezes  CV: regular rate and rhythm, normal S1 S2, no S3 or S4, no murmur, click or rub, no peripheral edema and peripheral pulses strong  ABDOMEN: soft, nontender, no hepatosplenomegaly, no  masses and bowel sounds normal  MS: no gross musculoskeletal defects noted, no edema            Assessment & Plan       ICD-10-CM    1. Lateral epicondylitis of right elbow  M77.11       point tenderness on left lateral epicondyle, will have her to try home PT and tennis elbow straps       No follow-ups on file.    Dilan Soto MD  [facility] [place]

## 2020-04-15 NOTE — TELEPHONE ENCOUNTER
Requested Prescriptions   Pending Prescriptions Disp Refills     terbinafine (LAMISIL) 250 MG tablet [Pharmacy Med Name: TERBINAFINE  MG TABLET] 90 tablet 0     Sig: TAKE 1 TABLET BY MOUTH EVERY DAY       There is no refill protocol information for this order        Last Written Prescription Date:  10/5/18, (1/29/19 #90)  Last Fill Quantity: 30,  # refills: 2   Last office visit: 4/23/2019 with prescribing provider:  BE   Future Office Visit:      
Since she finished 3 months course of Lamisil recently, I would like to give her a break to minimize the liver toxicity from the meds.   Please have her to recheck LFT in 1 month before considering resuming medicine. Future order placed  thx  
2g

## 2020-06-04 DIAGNOSIS — I10 BENIGN ESSENTIAL HYPERTENSION: ICD-10-CM

## 2020-06-04 RX ORDER — CARVEDILOL 25 MG/1
TABLET ORAL
Qty: 180 TABLET | Refills: 0 | Status: SHIPPED | OUTPATIENT
Start: 2020-06-04 | End: 2023-04-10

## 2020-06-04 NOTE — TELEPHONE ENCOUNTER
Prescription approved per [facility] Refill Protocol.    Akilah NOYOLA RN  EP Triage

## 2020-06-10 ENCOUNTER — HOSPITAL ENCOUNTER (OUTPATIENT)
Dept: BONE DENSITY | Facility: CLINIC | Age: 60
Discharge: HOME | End: 2020-06-10
Attending: NURSE PRACTITIONER | Admitting: NURSE PRACTITIONER
Payer: COMMERCIAL

## 2020-06-10 DIAGNOSIS — Z79.811 AROMATASE INHIBITOR USE: ICD-10-CM

## 2020-06-10 PROCEDURE — 77080 DXA BONE DENSITY AXIAL: CPT

## 2020-06-16 ENCOUNTER — TRANSFERRED RECORDS (OUTPATIENT)
Dept: HEALTH INFORMATION MANAGEMENT | Facility: CLINIC | Age: 60
End: 2020-06-16

## 2020-08-05 ENCOUNTER — TELEPHONE (OUTPATIENT)
Dept: FAMILY MEDICINE | Facility: CLINIC | Age: 60
End: 2020-08-05

## 2020-08-05 NOTE — TELEPHONE ENCOUNTER
She is supposed to do CPE and lab(not for the sx), I think she should consider reschedule CPE and lab to when her sx improving. Please let her know  thx

## 2020-08-05 NOTE — TELEPHONE ENCOUNTER
Patient is calling, she has a phyical in person tomorrow with Dr. Soto. She wanted to inform Dr. Soto as an FYI that she developed symptoms of a sore throat last week and intermittent dry cough. This occurred last Wednesday and the sore throat went away with OTC medications and stopped on Sunday. Patient states she only has a minimal dry cough, otherwise denies any fevers or any other cold symptoms.     Patient is wanting to make sure it is okay for her to come into clinic tomorrow for her appointment. Per Dr. Liu we are seeing patients that may have those symptoms, but should make sure in appointment note to have full PPE listed along with making sure patient is wearing a surgical mask instead of cloth when she comes into clinic.     Dr. Soto, what are your thoughts? Are you okay with patient coming in tomorrow?     Okay to leave a detailed message? YES    Natalia Chau RN, BSN  [facility]

## 2020-08-05 NOTE — TELEPHONE ENCOUNTER
General Call:   Who is calling:  pt  Reason for Call:  Regarding tomorrow too  What are your questions or concerns:  Pt called and stated that she has question about tomorrow too so she would like nursess to call her back    Pt is not willing to leave detailed message about her question   Date of last appointment with provider: BE   Okay to leave a detailed message:Yes at Cell number on file:    Telephone Information:   Mobile [phone]

## 2020-08-05 NOTE — TELEPHONE ENCOUNTER
Routing to team to help her reschedule.   Odilia Yeh RN   [facility] - Triage

## 2020-08-09 DIAGNOSIS — I10 BENIGN ESSENTIAL HYPERTENSION: ICD-10-CM

## 2020-08-10 RX ORDER — CARVEDILOL 25 MG/1
TABLET ORAL
Qty: 180 TABLET | Refills: 0 | OUTPATIENT
Start: 2020-08-10

## 2020-08-10 RX ORDER — CANDESARTAN 32 MG/1
TABLET ORAL
Qty: 90 TABLET | Refills: 1 | Status: SHIPPED | OUTPATIENT
Start: 2020-08-10 | End: 2021-02-03

## 2020-08-10 NOTE — TELEPHONE ENCOUNTER
Routing refill request to provider for review/approval because:  Labs not current:  Uday [facility]UDLEY  Please address at upcoming OV on 8/24/2020.  JOSEPHINE RigginsN, RN  [employer] Triage

## 2020-08-24 ENCOUNTER — OFFICE VISIT (OUTPATIENT)
Dept: FAMILY MEDICINE | Facility: CLINIC | Age: 60
End: 2020-08-24
Payer: COMMERCIAL

## 2020-08-24 VITALS
TEMPERATURE: 98.7 F | BODY MASS INDEX: 34.6 KG/M2 | OXYGEN SATURATION: 98 % | WEIGHT: 188 LBS | HEIGHT: 62 IN | HEART RATE: 64 BPM | DIASTOLIC BLOOD PRESSURE: 80 MMHG | SYSTOLIC BLOOD PRESSURE: 132 MMHG

## 2020-08-24 DIAGNOSIS — I10 BENIGN ESSENTIAL HYPERTENSION: ICD-10-CM

## 2020-08-24 DIAGNOSIS — Z12.11 COLON CANCER SCREENING: ICD-10-CM

## 2020-08-24 DIAGNOSIS — Z00.00 ROUTINE GENERAL MEDICAL EXAMINATION AT A HEALTH CARE FACILITY: Primary | ICD-10-CM

## 2020-08-24 LAB
ALBUMIN SERPL-MCNC: 4 G/DL (ref 3.4–5)
ALP SERPL-CCNC: 86 U/L (ref 40–150)
ALT SERPL W P-5'-P-CCNC: 23 U/L (ref 0–50)
ANION GAP SERPL CALCULATED.3IONS-SCNC: 7 MMOL/L (ref 3–14)
AST SERPL W P-5'-P-CCNC: 14 U/L (ref 0–45)
BILIRUB SERPL-MCNC: 0.9 MG/DL (ref 0.2–1.3)
BUN SERPL-MCNC: 10 MG/DL (ref 7–30)
CALCIUM SERPL-MCNC: 9.2 MG/DL (ref 8.5–10.1)
CHLORIDE SERPL-SCNC: 106 MMOL/L (ref 94–109)
CHOLEST SERPL-MCNC: 213 MG/DL
CO2 SERPL-SCNC: 26 MMOL/L (ref 20–32)
CREAT SERPL-MCNC: 0.61 MG/DL (ref 0.52–1.04)
ERYTHROCYTE [DISTWIDTH] IN BLOOD BY AUTOMATED COUNT: 14 % (ref 10–15)
GFR SERPL CREATININE-BSD FRML MDRD: >90 ML/MIN/{1.73_M2}
GLUCOSE SERPL-MCNC: 96 MG/DL (ref 70–99)
HCT VFR BLD AUTO: 41.2 % (ref 35–47)
HDLC SERPL-MCNC: 66 MG/DL
HGB BLD-MCNC: 13.9 G/DL (ref 11.7–15.7)
LDLC SERPL CALC-MCNC: 127 MG/DL
MCH RBC QN AUTO: 29.1 PG (ref 26.5–33)
MCHC RBC AUTO-ENTMCNC: 33.7 G/DL (ref 31.5–36.5)
MCV RBC AUTO: 86 FL (ref 78–100)
NONHDLC SERPL-MCNC: 147 MG/DL
PLATELET # BLD AUTO: 319 10E9/L (ref 150–450)
POTASSIUM SERPL-SCNC: 4 MMOL/L (ref 3.4–5.3)
PROT SERPL-MCNC: 8 G/DL (ref 6.8–8.8)
RBC # BLD AUTO: 4.78 10E12/L (ref 3.8–5.2)
SODIUM SERPL-SCNC: 139 MMOL/L (ref 133–144)
TRIGL SERPL-MCNC: 102 MG/DL
WBC # BLD AUTO: 5.1 10E9/L (ref 4–11)

## 2020-08-24 PROCEDURE — 85027 COMPLETE CBC AUTOMATED: CPT | Performed by: FAMILY MEDICINE

## 2020-08-24 PROCEDURE — 99396 PREV VISIT EST AGE 40-64: CPT | Performed by: FAMILY MEDICINE

## 2020-08-24 PROCEDURE — 80053 COMPREHEN METABOLIC PANEL: CPT | Performed by: FAMILY MEDICINE

## 2020-08-24 PROCEDURE — 36415 COLL VENOUS BLD VENIPUNCTURE: CPT | Performed by: FAMILY MEDICINE

## 2020-08-24 PROCEDURE — 80061 LIPID PANEL: CPT | Performed by: FAMILY MEDICINE

## 2020-08-24 ASSESSMENT — MIFFLIN-ST. JEOR: SCORE: 1391.76

## 2020-08-24 NOTE — PROGRESS NOTES
SUBJECTIVE:   CC: Theresa M Delosreyes is an 59 year old woman who presents for preventive health visit.     Healthy Habits:    Do you get at least three servings of calcium containing foods daily (dairy, green leafy vegetables, etc.)? yes    Amount of exercise or daily activities, outside of work: 5 day(s) per week    Problems taking medications regularly No    Medication side effects: No    Have you had an eye exam in the past two years? no    Do you see a dentist twice per year? no    Do you have sleep apnea, excessive snoring or daytime drowsiness?yes      Today's PHQ-2 Score:   PHQ-2 ( 1999 [employer]) 8/24/2020 6/17/2019   Q1: Little interest or pleasure in doing things 0 0   Q2: Feeling down, depressed or hopeless 0 0   PHQ-2 Score 0 0   Q1: Little interest or pleasure in doing things - -   Q2: Feeling down, depressed or hopeless - -   PHQ-2 Score - -       Abuse: Current or Past(Physical, Sexual or Emotional)- No  Do you feel safe in your environment? Yes        Social History     Tobacco Use     Smoking status: Never Smoker     Smokeless tobacco: Never Used   Substance Use Topics     Alcohol use: No     Alcohol/week: 0.0 standard drinks     If you drink alcohol do you typically have >3 drinks per day or >7 drinks per week? No                     Reviewed orders with patient.  Reviewed health maintenance and updated orders accordingly - Yes  BP Readings from Last 3 Encounters:   08/24/20 132/80   03/19/20 138/82   11/04/19 (!) 130/90    Wt Readings from Last 3 Encounters:   08/24/20 85.3 kg (188 lb)   03/19/20 86.2 kg (190 lb)   11/12/19 84.4 kg (186 lb)                  Patient Active Problem List   Diagnosis     Female stress incontinence     Status post laparoscopic cholecystectomy     Osteoarthritis of both knees     Obesity (BMI 30-39.9)     BRIT (obstructive sleep apnea)     Hyperlipidemia LDL goal <130     Hypertension goal BP (blood pressure) < 140/90     Chest pain     Infiltrating ductal carcinoma of  "right female breast (H)     Antineoplastic antibiotics causing adverse effect in therapeutic use     Breast cancer (H)     Benign essential hypertension     Drugs and medicinal substances causing adverse effect in therapeutic use, initial encounter     Obesity (BMI 35.0-39.9) with comorbidity (H)     Past Surgical History:   Procedure Laterality Date     COLONOSCOPY  5/2006    ormal to lt transverse colon, incomplete due to \"extremely redundant colon\"\"     D & C  9/2004     EXTRACT LENS CLEAR, EXCHANGE LENS REFRACTIVE, COMBINED  3/2013    bilateral     GRAFT FAT TO BREAST Bilateral 11/18/2016    Procedure: GRAFT FAT TO BREAST;  Surgeon: Alhaji Patton MD;  [facility]     GRAFT FAT TO BREAST  12/14/2018    Procedure: GRAFT FAT  FROM ABDOMEN TO BILATERAL  BREAST;  Surgeon: Alhaji Patton MD;  [facility]     GRAFT FREE VASCULARIZED TRANSVERSE RECTUS ABDOMINIS MYOCUTANEOUS Bilateral 4/12/2016    Procedure: GRAFT FREE VASCULARIZED TRANSVERSE RECTUS ABDOMINIS MYOCUTANEOUS;  Surgeon: Alhaji Patton MD;  [facility]: [facility]     HC ABLATION, ENDOMETRIAL, THERMAL, W/O HYSTEROSCOPIC GUIDANCE  11/2004    NovaSure endometrial ablation.      HC COLON AIR CONTRAST  5/2006    normal     HC SLING OPERATION FOR STRESS INCONTINENCE  1/2007    [facility] sling procedure     INSERT PORT VASCULAR ACCESS Left 11/9/2015    Procedure: INSERT PORT VASCULAR ACCESS;  Surgeon: August Rdz MD;  [facility]     INSERT TISSUE EXPANDER BREAST BILATERAL Bilateral 9/29/2015    Procedure: INSERT TISSUE EXPANDER BREAST BILATERAL;  Surgeon: Alhaji Patton MD;  Location:  OR     LAPAROSCOPIC CHOLECYSTECTOMY WITH CHOLANGIOGRAMS  1/2005    Laparoscopic cholecystectomy with intraoperative  cholangiogram     MASTECTOMY SIMPLE BILATERAL, SENTINEL NODE BILATERAL, COMBINED Bilateral 9/29/2015    Procedure: COMBINED MASTECTOMY SIMPLE BILATERAL, SENTINEL NODE BILATERAL;  Surgeon: August Rdz MD;  " [facility]: [facility]     RECONSTRUCT BREAST BILATERAL Bilateral 11/18/2016    Procedure: RECONSTRUCT BREAST BILATERAL;  Surgeon: Alhaji Patton MD;  [facility]     REMOVE PORT VASCULAR ACCESS N/A 11/18/2016    Procedure: REMOVE PORT VASCULAR ACCESS;  Surgeon: Alhaji Patton MD;  [facility]     REMOVE TISSUE EXPANDER BREAST Bilateral 4/12/2016    Procedure: REMOVE TISSUE EXPANDER BREAST;  Surgeon: Alhaji Patton MD;  Location:  OR     REVISE RECONSTRUCTED BREAST BILATERAL Bilateral 12/14/2018    Procedure: REVISION OF BILATERAL BREAST RECONSTRUCTION ( VASER );  Surgeon: Alhaji Patton MD;  [facility]     REVISE SCAR TRUNK N/A 11/18/2016    Procedure: REVISE SCAR TRUNK;  Surgeon: Alhaji Patton MD;  [facility]     TONSILLECTOMY  2001     VASCULAR SURGERY      insert port and removed       Social History     Tobacco Use     Smoking status: Never Smoker     Smokeless tobacco: Never Used   Substance Use Topics     Alcohol use: No     Alcohol/week: 0.0 standard drinks     Family History   Problem Relation Age of Onset     Asthma Daughter      C.A.D. Father 40        MI     Cerebrovascular Disease Father      Hypertension Mother      Breast Cancer Mother      Thyroid Disease Mother         hypothyroidism     Sleep Apnea Mother      Breast Cancer Sister      Hypertension Sister      Hypertension Brother      Heart Disease Brother         pacemaker     Sleep Apnea Brother      Hypertension Brother      Diabetes Brother         type 2     Sleep Apnea Brother      Myocardial Infarction Maternal Grandmother      Diabetes Paternal Grandmother      Unknown/Adopted Maternal Grandfather      Unknown/Adopted Paternal Grandfather      Diabetes Maternal Aunt      Cancer - colorectal Maternal Uncle      Diabetes Maternal Aunt         with retinopathy leading to blindness         Current Outpatient Medications   Medication Sig Dispense Refill     aspirin 81 MG EC  tablet Take 81 mg by mouth daily       candesartan (ATACAND) 32 MG tablet TAKE 1 TABLET BY MOUTH EVERY DAY 90 tablet 1     carvedilol (COREG) 25 MG tablet TAKE ONE TABLET (25 MG) BY MOUTH TWICE A DAY WITH MEALS 180 tablet 0     carvedilol (COREG) 25 MG tablet Take 1 tablet (25 mg) by mouth daily Take one tablet (25 mg) by mouth twice a day with meals 90 tablet 3     Cyanocobalamin (VITAMIN B12 PO) Take by mouth daily       letrozole (FEMARA) 2.5 MG tablet Take 2.5 mg by mouth At Bedtime   0     VITAMIN D, CHOLECALCIFEROL, PO Take 3,000 Units by mouth daily       denosumab (PROLIA) 60 MG/ML SOLN injection Inject 60 mg Subcutaneous every 6 months       gabapentin (NEURONTIN) 100 MG capsule Take 1 capsule (100 mg) by mouth 2 times daily (Patient not taking: Reported on 8/24/2020) 20 capsule 0     valACYclovir (VALTREX) 1000 mg tablet Take 1 tablet (1,000 mg) by mouth 2 times daily for 10 days 20 tablet 0     Allergies   Allergen Reactions     Definity Other (See Comments)     A substance that helps with imaging the heart muscle with echocardiology    Full body ache starting in the low back.      Keflex [Cephalexin]      Hand swelling, blisters???     Recent Labs   Lab Test 06/17/19  1051 06/07/19 01/28/19  1543 11/30/18  1036 10/04/18  1842 06/01/18  0953 07/03/17  0824  04/03/15  0717 09/11/14  1656   *  --   --   --   --  100* 110*  --  84  --    HDL 67  --   --   --   --  54 65  --  62  --    TRIG 146  --   --   --   --  126 146  --  119  --    ALT  --  25 25  --  32 48 29  --   --   --    CR  --  0.65  --  0.76  --  0.68 0.62   < > 0.64  --    GFRESTIMATED  --  98.1  --  78  --  89 >90  Non  GFR Calc     < > >90  Non  GFR Calc    --    GFRESTBLACK  --   --   --  >90  --  >90 >90  African American GFR Calc     < > >90   GFR Calc    --    POTASSIUM  --  4.2  --  4.0  --  4.1 4.0   < > 3.6  --    TSH  --   --   --   --   --   --   --   --  2.34 2.42    < > =  "values in this interval not displayed.        Mammogram Screening: Patient over age 50, mutual decision to screen reflected in health maintenance.    Pertinent mammograms are reviewed under the imaging tab.  History of abnormal Pap smear: NO - age 30- 65 PAP every 3 years recommended     Reviewed and updated as needed this visit by clinical staff  Tobacco  Allergies  Meds         Reviewed and updated as needed this visit by Provider        Past Medical History:   Diagnosis Date     Benign hypertension     started medication 3/13     Cancer (H)     breast cancer, right     Chest pain     side effect of Femera     Female stress incontinence 2006    s/p sling procedure     Mixed hyperlipidemia      Motion sickness      Obesity (BMI 30-39.9)      BRIT (obstructive sleep apnea)     not using CPAP, treated with tonsilliectomy     Osteoarthritis of both knees      PONV (postoperative nausea and vomiting)      Status post laparoscopic cholecystectomy 2005    Cholelithiasis with mild subacute hemorrhagic cholecystitis     Supervision of other normal pregnancy      - vaginal     Uncomplicated asthma     with respiratory infections      Past Surgical History:   Procedure Laterality Date     COLONOSCOPY  2006    ormal to lt transverse colon, incomplete due to \"extremely redundant colon\"\"     D & C  2004     EXTRACT LENS CLEAR, EXCHANGE LENS REFRACTIVE, COMBINED  3/2013    bilateral     GRAFT FAT TO BREAST Bilateral 2016    Procedure: GRAFT FAT TO BREAST;  Surgeon: Alhaji Patton MD;  [facility]     GRAFT FAT TO BREAST  2018    Procedure: GRAFT FAT  FROM ABDOMEN TO BILATERAL  BREAST;  Surgeon: Alhaji Patton MD;  [facility]     GRAFT FREE VASCULARIZED TRANSVERSE RECTUS ABDOMINIS MYOCUTANEOUS Bilateral 2016    Procedure: GRAFT FREE VASCULARIZED TRANSVERSE RECTUS ABDOMINIS MYOCUTANEOUS;  Surgeon: Alhaji Patton MD;  Location:  OR      " ABLATION, ENDOMETRIAL, THERMAL, W/O HYSTEROSCOPIC GUIDANCE  11/2004    NovaSure endometrial ablation.      HC COLON AIR CONTRAST  5/2006    normal     HC SLING OPERATION FOR STRESS INCONTINENCE  1/2007    [facility] sling procedure     INSERT PORT VASCULAR ACCESS Left 11/9/2015    Procedure: INSERT PORT VASCULAR ACCESS;  Surgeon: August Rdz MD;  Location: [facility]     INSERT TISSUE EXPANDER BREAST BILATERAL Bilateral 9/29/2015    Procedure: INSERT TISSUE EXPANDER BREAST BILATERAL;  Surgeon: Alhaji Patton MD;  Location:  OR     LAPAROSCOPIC CHOLECYSTECTOMY WITH CHOLANGIOGRAMS  1/2005    Laparoscopic cholecystectomy with intraoperative  cholangiogram     MASTECTOMY SIMPLE BILATERAL, SENTINEL NODE BILATERAL, COMBINED Bilateral 9/29/2015    Procedure: COMBINED MASTECTOMY SIMPLE BILATERAL, SENTINEL NODE BILATERAL;  Surgeon: August Rdz MD;  Location:  OR     RECONSTRUCT BREAST BILATERAL Bilateral 11/18/2016    Procedure: RECONSTRUCT BREAST BILATERAL;  Surgeon: Alhaji Patton MD;  [facility]     REMOVE PORT VASCULAR ACCESS N/A 11/18/2016    Procedure: REMOVE PORT VASCULAR ACCESS;  Surgeon: Alhaji Patton MD;  [facility]     REMOVE TISSUE EXPANDER BREAST Bilateral 4/12/2016    Procedure: REMOVE TISSUE EXPANDER BREAST;  Surgeon: Alhaji Patton MD;  Location:  OR     REVISE RECONSTRUCTED BREAST BILATERAL Bilateral 12/14/2018    Procedure: REVISION OF BILATERAL BREAST RECONSTRUCTION ( VASER );  Surgeon: Alhaji Patton MD;  [facility]     REVISE SCAR TRUNK N/A 11/18/2016    Procedure: REVISE SCAR TRUNK;  Surgeon: Alhaji Patton MD;  [facility]     TONSILLECTOMY  2001     VASCULAR SURGERY      insert port and removed       ROS:  CONSTITUTIONAL: NEGATIVE for fever, chills, change in weight  INTEGUMENTARY/SKIN: NEGATIVE for worrisome rashes, moles or lesions  EYES: NEGATIVE for vision changes or irritation  ENT: NEGATIVE for ear,  "mouth and throat problems  RESP: NEGATIVE for significant cough or SOB  BREAST: NEGATIVE for masses, tenderness or discharge  CV: NEGATIVE for chest pain, palpitations or peripheral edema  GI: NEGATIVE for nausea, abdominal pain, heartburn, or change in bowel habits  : NEGATIVE for unusual urinary or vaginal symptoms. No vaginal bleeding.  MUSCULOSKELETAL: NEGATIVE for significant arthralgias or myalgia  NEURO: NEGATIVE for weakness, dizziness or paresthesias  PSYCHIATRIC: NEGATIVE for changes in mood or affect     OBJECTIVE:   /80   Pulse 64   Temp 98.7  F (37.1  C) (Tympanic)   Ht 1.584 m (5' 2.36\")   Wt 85.3 kg (188 lb)   LMP 02/23/2013   SpO2 98%   BMI 33.99 kg/m    EXAM:  GENERAL: healthy, alert and no distress  EYES: Eyes grossly normal to inspection, PERRL and conjunctivae and sclerae normal  HENT: ear canals and TM's normal, nose and mouth without ulcers or lesions  NECK: no adenopathy, no asymmetry, masses, or scars and thyroid normal to palpation  RESP: lungs clear to auscultation - no rales, rhonchi or wheezes  CV: regular rate and rhythm, normal S1 S2, no S3 or S4, no murmur, click or rub, no peripheral edema and peripheral pulses strong  ABDOMEN: soft, nontender, no hepatosplenomegaly, no masses and bowel sounds normal  MS: no gross musculoskeletal defects noted, no edema  SKIN: no suspicious lesions or rashes  NEURO: Normal strength and tone, mentation intact and speech normal  BACK: no CVA tenderness, no paralumbar tenderness  PSYCH: mentation appears normal, affect normal/bright  LYMPH: no cervical, supraclavicular, axillary, or inguinal adenopathy        ASSESSMENT/PLAN:       ICD-10-CM    1. Routine general medical examination at a health care facility  Z00.00 CBC with platelets     Comprehensive metabolic panel (BMP + Alb, Alk Phos, ALT, AST, Total. Bili, TP)     Lipid panel reflex to direct LDL Fasting     GASTROENTEROLOGY ADULT REF PROCEDURE ONLY   2. Benign essential hypertension " " I10    3. Colon cancer screening  Z12.11 GASTROENTEROLOGY ADULT REF PROCEDURE ONLY       COUNSELING:   Reviewed preventive health counseling, as reflected in patient instructions    Estimated body mass index is 33.99 kg/m  as calculated from the following:    Height as of this encounter: 1.584 m (5' 2.36\").    Weight as of this encounter: 85.3 kg (188 lb).         reports that she has never smoked. She has never used smokeless tobacco.      Counseling Resources:  ATP IV Guidelines  Pooled Cohorts Equation Calculator  Breast Cancer Risk Calculator  FRAX Risk Assessment  ICSI Preventive Guidelines  Dietary Guidelines for Americans, 2010  USDA's MyPlate  ASA Prophylaxis  Lung CA Screening    Dilan Soto MD  [facility]  "

## 2020-09-17 DIAGNOSIS — Z11.59 ENCOUNTER FOR SCREENING FOR OTHER VIRAL DISEASES: Primary | ICD-10-CM

## 2020-10-09 DIAGNOSIS — Z11.59 ENCOUNTER FOR SCREENING FOR OTHER VIRAL DISEASES: ICD-10-CM

## 2020-10-09 PROCEDURE — U0003 INFECTIOUS AGENT DETECTION BY NUCLEIC ACID (DNA OR RNA); SEVERE ACUTE RESPIRATORY SYNDROME CORONAVIRUS 2 (SARS-COV-2) (CORONAVIRUS DISEASE [COVID-19]), AMPLIFIED PROBE TECHNIQUE, MAKING USE OF HIGH THROUGHPUT TECHNOLOGIES AS DESCRIBED BY CMS-2020-01-R: HCPCS | Performed by: COLON & RECTAL SURGERY

## 2020-10-10 LAB
SARS-COV-2 RNA SPEC QL NAA+PROBE: NOT DETECTED
SPECIMEN SOURCE: NORMAL

## 2020-10-11 NOTE — H&P
Colon & Rectal Surgery History and Physical  Pre-Endoscopy Procedure Note    History of Present Illness   I have been asked by Dr. Soto to evaluate this 60 year old female for colorectal polyp surveillance. She had an adenomatous polyp removed during her last colonoscopy in .  She currently denies any abdominal pain, weight loss, bleeding per rectum, or recent change in bowel habits.    Past Medical History  Diagnosis Date     Benign hypertension     started medication 3/13     Cancer (H)     breast cancer, right     Chest pain     side effect of Femera     Female stress incontinence 2006    s/p sling procedure     Mixed hyperlipidemia      Motion sickness      Obesity (BMI 30-39.9)      BRIT (obstructive sleep apnea)     not using CPAP, treated with tonsilliectomy     Osteoarthritis of both knees      PONV (postoperative nausea and vomiting)     Cholelithiasis with mild subacute hemorrhagic cholecystitis     Supervision of other normal pregnancy      - vaginal     Uncomplicated asthma     with respiratory infections       Past Surgical History  Procedure Laterality Date     D & C  2004     EXTRACT LENS CLEAR, EXCHANGE LENS REFRACTIVE, COMBINED  3/2013    bilateral     GRAFT FAT TO BREAST Bilateral 2016    Procedure: GRAFT FAT TO BREAST;  Surgeon: Alhaji Patton MD;  [facility]     GRAFT FAT TO BREAST  2018    Procedure: GRAFT FAT  FROM ABDOMEN TO BILATERAL  BREAST;  Surgeon: Alhaji Patton MD;  [facility]     GRAFT FREE VASCULARIZED TRANSVERSE RECTUS ABDOMINIS MYOCUTANEOUS Bilateral 2016    Procedure: GRAFT FREE VASCULARIZED TRANSVERSE RECTUS ABDOMINIS MYOCUTANEOUS;  Surgeon: Alhaji Patton MD;  [facility]: [facility]     ABLATION, ENDOMETRIAL, THERMAL, W/O HYSTEROSCOPIC GUIDANCE  2004    NovaSure endometrial ablation.      COLON AIR CONTRAST  2006    normal     SLING OPERATION FOR STRESS INCONTINENCE  2007    [facility] sling  procedure     INSERT PORT VASCULAR ACCESS Left 11/9/2015    Procedure: INSERT PORT VASCULAR ACCESS;  Surgeon: August Rdz MD;  [facility]     INSERT TISSUE EXPANDER BREAST BILATERAL Bilateral 9/29/2015    Procedure: INSERT TISSUE EXPANDER BREAST BILATERAL;  Surgeon: Alhaji Patton MD;  Location:  OR     LAPAROSCOPIC CHOLECYSTECTOMY WITH CHOLANGIOGRAMS  1/2005    Laparoscopic cholecystectomy with intraoperative  cholangiogram     MASTECTOMY SIMPLE BILATERAL, SENTINEL NODE BILATERAL, COMBINED Bilateral 9/29/2015    Procedure: COMBINED MASTECTOMY SIMPLE BILATERAL, SENTINEL NODE BILATERAL;  Surgeon: August Rdz MD;  Location:  OR     RECONSTRUCT BREAST BILATERAL Bilateral 11/18/2016    Procedure: RECONSTRUCT BREAST BILATERAL;  Surgeon: Alhaji Patton MD;  [facility]     REMOVE PORT VASCULAR ACCESS N/A 11/18/2016    Procedure: REMOVE PORT VASCULAR ACCESS;  Surgeon: Alhaji Patton MD;  [facility]:  [facility]     REMOVE TISSUE EXPANDER BREAST Bilateral 4/12/2016    Procedure: REMOVE TISSUE EXPANDER BREAST;  Surgeon: Alhaji Patton MD;  Location:  OR     REVISE RECONSTRUCTED BREAST BILATERAL Bilateral 12/14/2018    Procedure: REVISION OF BILATERAL BREAST RECONSTRUCTION ( VASER );  Surgeon: Alhaji Patton MD;  [facility]:  [facility]     REVISE SCAR TRUNK N/A 11/18/2016    Procedure: REVISE SCAR TRUNK;  Surgeon: Alhaji Patton MD;  [facility]:  [facility]     TONSILLECTOMY  2001     VASCULAR SURGERY      insert port and removed        Medications  Medication Sig     aspirin 81 MG EC tablet Take 81 mg by mouth daily     candesartan (ATACAND) 32 MG tablet TAKE 1 TABLET BY MOUTH EVERY DAY     carvedilol (COREG) 25 MG tablet TAKE ONE TABLET (25 MG) BY MOUTH TWICE A DAY WITH MEALS     carvedilol (COREG) 25 MG tablet Take 1 tablet (25 mg) by mouth daily Take one tablet (25 mg) by mouth twice a day with meals     letrozole (FEMARA) 2.5 MG tablet Take 2.5 mg by  mouth At Bedtime      Cyanocobalamin (VITAMIN B12 PO) Take by mouth daily     denosumab (PROLIA) 60 MG/ML SOLN injection Inject 60 mg Subcutaneous every 6 months     gabapentin (NEURONTIN) 100 MG capsule Take 1 capsule (100 mg) by mouth 2 times daily (Patient not taking: Reported on 8/24/2020)     valACYclovir (VALTREX) 1000 mg tablet Take 1 tablet (1,000 mg) by mouth 2 times daily for 10 days     VITAMIN D, CHOLECALCIFEROL, PO Take 3,000 Units by mouth daily       Allergies  Allergen Reactions     Definity Body aches     Keflex [Cephalexin]      Hand swelling, blisters        Family History   Family history includes Asthma in her daughter; Breast Cancer in her mother and sister; C.A.D. (age of onset: 40) in her father; Cancer - colorectal in her maternal uncle; Cerebrovascular Disease in her father; Diabetes in her brother, maternal aunt, maternal aunt, and paternal grandmother; Heart Disease in her brother; Hypertension in her brother, brother, mother, and sister; Myocardial Infarction in her maternal grandmother; Sleep Apnea in her brother, brother, and mother; Thyroid Disease in her mother.    Social History    She reports that she has never smoked. She has never used smokeless tobacco. She reports that she does not drink alcohol or use drugs.    Review of Systems   Constitutional:  No fever, weight change or fatigue.    Eyes:     No dry eyes or vision changes.   Ears/Nose/Throat/Neck:  No oral ulcers, sore throat or voice change.    Cardiovascular:   No palpitations, syncope, angina or edema.   Respiratory:    No chest pain, excessive sleepiness, shortness of breath or hemoptysis.    Gastrointestinal:   No abdominal pain, nausea, vomiting, diarrhea or heartburn.    Genitourinary:   No dysuria, hematuria, urinary retention or urinary frequency.   Musculoskeletal:  No joint swelling or arthralgias.    Dermatologic:  No skin rash or other skin changes.   Neurologic:    No focal weakness or numbness. No  "neuropathy.   Psychiatric:    No depression, anxiety, suicidal ideation, or paranoid ideation.   Endocrine:   No cold or heat intolerance, polydipsia, hirsutism, change in libido, or flushing.   Hematology/Lymphatic:  No bleeding or lymphadenopathy.    Allergy/Immunology:  No rhinitis or hives.     Physical Exam   Vitals:  /86, HR 64, RR 12, temperature 98.3  F (36.8  C), temperature source Oral, height 1.6 m (5' 3\"), weight 83.5 kg (184 lb), last menstrual period 02/23/2013, SpO2 97 %.    General:  Alert and oriented to person, place and time   Airway: Normal oropharyngeal airway and neck mobility   Lungs:  Clear bilaterally   Heart:  Regular rate and rhythm   Abdomen: Soft, NT, ND, no masses   Rectal:  Perianal skin without excoriation, hemorrhoidal disease or anal fissure        Digital rectal examination reveals normal sphincter tone without masses    ASA Grade: II (mild systemic disease)    Impression: Cleared for use of conscious sedation for colorectal polyp surveillance    Plan: Proceed with colonoscopy     Rebecca Tomlinson MD, MD  [facility]  [phone]  "

## 2020-10-12 ENCOUNTER — HOSPITAL ENCOUNTER (OUTPATIENT)
Facility: CLINIC | Age: 60
Discharge: HOME | End: 2020-10-12
Attending: COLON & RECTAL SURGERY | Admitting: COLON & RECTAL SURGERY
Payer: COMMERCIAL

## 2020-10-12 VITALS
HEART RATE: 60 BPM | BODY MASS INDEX: 32.6 KG/M2 | SYSTOLIC BLOOD PRESSURE: 94 MMHG | OXYGEN SATURATION: 95 % | RESPIRATION RATE: 15 BRPM | DIASTOLIC BLOOD PRESSURE: 66 MMHG | WEIGHT: 184 LBS | TEMPERATURE: 98.3 F | HEIGHT: 63 IN

## 2020-10-12 LAB — COLONOSCOPY: NORMAL

## 2020-10-12 PROCEDURE — G0105 COLORECTAL SCRN; HI RISK IND: HCPCS | Performed by: COLON & RECTAL SURGERY

## 2020-10-12 PROCEDURE — 45378 DIAGNOSTIC COLONOSCOPY: CPT | Performed by: COLON & RECTAL SURGERY

## 2020-10-12 PROCEDURE — 250N000011 HC RX IP 250 OP 636: Performed by: COLON & RECTAL SURGERY

## 2020-10-12 PROCEDURE — G0500 MOD SEDAT ENDO SERVICE >5YRS: HCPCS | Performed by: COLON & RECTAL SURGERY

## 2020-10-12 RX ORDER — FENTANYL CITRATE 50 UG/ML
INJECTION, SOLUTION INTRAMUSCULAR; INTRAVENOUS PRN
Status: DISCONTINUED | OUTPATIENT
Start: 2020-10-12 | End: 2020-10-12 | Stop reason: HOSPADM

## 2020-10-12 RX ORDER — LIDOCAINE 40 MG/G
CREAM TOPICAL
Status: DISCONTINUED | OUTPATIENT
Start: 2020-10-12 | End: 2020-10-12 | Stop reason: HOSPADM

## 2020-10-12 RX ORDER — ONDANSETRON 2 MG/ML
4 INJECTION INTRAMUSCULAR; INTRAVENOUS
Status: DISCONTINUED | OUTPATIENT
Start: 2020-10-12 | End: 2020-10-12 | Stop reason: HOSPADM

## 2020-10-12 ASSESSMENT — MIFFLIN-ST. JEOR: SCORE: 1378.75

## 2020-12-10 ENCOUNTER — TRANSFERRED RECORDS (OUTPATIENT)
Dept: HEALTH INFORMATION MANAGEMENT | Facility: CLINIC | Age: 60
End: 2020-12-10

## 2021-01-17 ENCOUNTER — MYC MEDICAL ADVICE (OUTPATIENT)
Dept: FAMILY MEDICINE | Facility: CLINIC | Age: 61
End: 2021-01-17

## 2021-01-17 DIAGNOSIS — I10 BENIGN ESSENTIAL HYPERTENSION: Primary | ICD-10-CM

## 2021-01-18 RX ORDER — LOSARTAN POTASSIUM 100 MG/1
100 TABLET ORAL DAILY
Qty: 90 TABLET | Refills: 1 | Status: SHIPPED | OUTPATIENT
Start: 2021-01-18 | End: 2021-07-13

## 2021-02-03 DIAGNOSIS — I10 BENIGN ESSENTIAL HYPERTENSION: ICD-10-CM

## 2021-02-03 RX ORDER — CANDESARTAN 32 MG/1
TABLET ORAL
Qty: 90 TABLET | Refills: 1 | Status: SHIPPED | OUTPATIENT
Start: 2021-02-03 | End: 2021-09-24

## 2021-04-08 ENCOUNTER — TRANSFERRED RECORDS (OUTPATIENT)
Dept: HEALTH INFORMATION MANAGEMENT | Facility: CLINIC | Age: 61
End: 2021-04-08

## 2021-07-12 ENCOUNTER — MYC MEDICAL ADVICE (OUTPATIENT)
Dept: FAMILY MEDICINE | Facility: CLINIC | Age: 61
End: 2021-07-12

## 2021-07-12 DIAGNOSIS — I10 BENIGN ESSENTIAL HYPERTENSION: ICD-10-CM

## 2021-07-13 RX ORDER — LOSARTAN POTASSIUM 100 MG/1
100 TABLET ORAL DAILY
Qty: 90 TABLET | Refills: 0 | Status: SHIPPED | OUTPATIENT
Start: 2021-07-13 | End: 2021-09-24

## 2021-08-08 NOTE — PATIENT INSTRUCTIONS
"MY TREATMENT INFORMATION FOR SLEEP APNEA-  Theresa M Delosreyes    DOCTOR : Bennett Ezra Goltz, PA-C  SLEEP CENTER : [facility]     MY CONTACT NUMBER: [phone]    Am I having a sleep study at a sleep center?  Make sure you have an appointment for the study before you leave!    Am I having a home sleep study?  Watch this video:  https://[email or web address].com/watch?v=CteI_[email or web address]    Frequently asked questions:  1. What is Obstructive Sleep Apnea (BRIT)? BRIT is the most common type of sleep apnea. Apnea means, \"without breath.\"  Apnea is most often caused by narrowing or collapse of the upper airway as muscles relax during sleep.   Almost everyone has occasional apneas. Most people with sleep apnea have had brief interruptions at night frequently for many years.  The severity of sleep apnea is related to how frequent and severe the events are.   2. What are the consequences of BRIT? Symptoms include: feeling sleepy during the day, snoring loudly, gasping or stopping of breathing, trouble sleeping, and occasionally morning headaches or heartburn at night.  Sleepiness can be serious and even increase the risk of falling asleep while driving. Other health consequences may include development of high blood pressure and other cardiovascular disease in persons who are susceptible. Untreated BRIT  can contribute to heart disease, stroke and diabetes.   3. What are the treatment options? In most situations, sleep apnea is a lifelong disease that must be managed with daily therapy. Medications are not effective for sleep apnea and surgery is generally not considered until other therapies have been tried. Your treatment is your choice . Continuous Positive Airway (CPAP) works right away and is the therapy that is effective in nearly everyone. An oral device to hold your jaw forward is usually the next most reliable option. Other options include postioning devices (to keep you off your back), weight " loss, and surgery including a tongue pacing device. There is more detail about some of these options below.    Important tips for using CPAP and similar devices   Know your equipment:  CPAP is continuous positive airway pressure that prevents obstructive sleep apnea by keeping the throat from collapsing while you are sleeping. In most cases, the device is  smart  and can slowly self-adjusts if your throat collapses and keeps a record every day of how well you are treated-this information is available to you and your care team.  BPAP is bilevel positive airway pressure that keeps your throat open and also assists each breath with a pressure boost to maintain adequate breathing.  Special kinds of BPAP are used in patients who have inadequate breathing from lung or heart disease. In most cases, the device is  smart  and can slowly self-adjusts to assist breathing. Like CPAP, the device keeps a record of how well you are treated.  Your mask is your connection to the device. You get to choose what feels most comfortable and the staff will help to make sure if fits. Here: are some examples of the different masks that are available:       Key points to remember on your journey with sleep apnea:  1. Sleep study.  PAP devices often need to be adjusted during a sleep study to show that they are effective and adjusted right.  2. Good tips to remember: Try wearing just the mask during a quiet time during the day so your body adapts to wearing it. A humidifier is recommended for comfort in most cases to prevent drying of your nose and throat. Allergy medication from your provider may help you if you are having nasal congestion.  3. Getting settled-in. It takes more than one night for most of us to get used to wearing a mask. Try wearing just the mask during a quiet time during the day so your body adapts to wearing it. A humidifier is recommended for comfort in most cases. Our team will work with you carefully on the first day  and will be in contact within 4 days and again at 2 and 4 weeks for advice and remote device adjustments. Your therapy is evaluated by the device each day.   4. Use it every night. The more you are able to sleep naturally for 7-8 hours, the more likely you will have good sleep and to prevent health risks or symptoms from sleep apnea. Even if you use it 4 hours it helps. Occasionally all of us are unable to use a medical therapy, in sleep apnea, it is not dangerous to miss one night.   5. Communicate. Call our skilled team on the number provided on the first day if your visit for problems that make it difficult to wear the device. Over 2 out of 3 patients can learn to wear the device long-term with help from our team. Remember to call our team or your sleep providers if you are unable to wear the device as we may have other solutions for those who cannot adapt to mask CPAP therapy. It is recommended that you sleep your sleep provider within the first 3 months and yearly after that if you are not having problems.   Take care of your equipment. Make sure you clean your mask and tubing using directions every day and that your filter and mask are replaced as recommended or if they are not working.     BESIDES CPAP, WHAT OTHER THERAPIES ARE THERE?    Positioning Device  Positioning devices are generally used when sleep apnea is mild and only occurs on your back.This example shows a pillow that straps around the waist. It may be appropriate for those whose sleep study shows milder sleep apnea that occurs primarily when lying flat on one's back. Preliminary studies have shown benefit but effectiveness at home may need to be verified by a home sleep test. These devices are generally not covered by medical insurance.    Oral Appliance  What is oral appliance therapy?  An oral appliance device fits on your teeth at night like a retainer used after having braces. The device is made by a specialized dentist and requires several  visits over 1-2 months before a manufactured device is made to fit your teeth and is adjusted to prevent your sleep apnea. Once an oral device is working properly, snoring should be improved. A home sleep test may be recommended at that time if to determine whether the sleep apnea is adequately treated.       Some things to remember:  -Oral devices are often, but not always, covered by your medical insurance. Be sure to check with your insurance provider.   -If you are referred for oral therapy, you will be given a list of specialized dentists to consider or you may choose to visit the Web site of the American Academy of Dental Sleep Medicine  -Oral devices are less likely to work if you have severe sleep apnea or are extremely overweight.     More detailed information  An oral appliance is a small acrylic device that fits over the upper and lower teeth  (similar to a retainer or a mouth guard). This device slightly moves jaw forward, which moves the base of the tongue forward, opens the airway, improves breathing for effective treat snoring and obstructive sleep apnea in perhaps 7 out of 10 people .  The best working devices are custom-made by a dental device  after a mold is made of the teeth 1, 2, 3.  When is an oral appliance indicated?  Oral appliance therapy is recommended as a first-line treatment for patients with primary snoring, mild sleep apnea, and for patients with moderate sleep apnea who prefer appliance therapy to use of CPAP4, 5. Severity of sleep apnea is determined by sleep testing and is based on the number of respiratory events per hour of sleep.   How successful is oral appliance therapy?  The success rate of oral appliance therapy in patients with mild sleep apnea is 75-80% while in patients with moderate sleep apnea it is 50-70%. The chance of success in patients with severe sleep apnea is 40-50%. The research also shows that oral appliances have a beneficial effect on the  cardiovascular health of BRIT patients at the same magnitude as CPAP therapy7.  Oral appliances should be a second-line treatment in cases of severe sleep apnea, but if not completely successful then a combination therapy utilizing CPAP plus oral appliance therapy may be effective. Oral appliances tend to be effective in a broad range of patients although studies show that the patients who have the highest success are females, younger patients, those with milder disease, and less severe obesity. 3, 6.   Finding a dentist that practices dental sleep medicine  Specific training is available through the American Academy of Dental Sleep Medicine for dentists interested in working in the field of sleep. To find a dentist who is educated in the field of sleep and the use of oral appliances, near you, visit the Web site of the American Academy of Dental Sleep Medicine.    References  1. Wilman et al. Objectively measured vs self-reported compliance during oral appliance therapy for sleep-disordered breathing. Chest 2013; 144(5): 7053-4712.  2. Herson et al. Objective measurement of compliance during oral appliance therapy for sleep-disordered breathing. Thorax 2013; 68(1): 91-96.  3. Francesca, et al. Mandibular advancement devices in 620 men and women with BRIT and snoring: tolerability and predictors of treatment success. Chest 2004; 125: 3530-4853.  4. Jennifer, et al. Oral appliances for snoring and BRIT: a review. Sleep 2006; 29: 244-262.  5. Olinda et al. Oral appliance treatment for BRIT: an update. J Clin Sleep Med 2014; 10(2): 215-227.  6. Brisa et al. Predictors of OSAH treatment outcome. J Dent Res 2007; 86: 4665-1146.      Weight Loss:    Weight loss is a long-term strategy that may improve sleep apnea in some patients.    Weight management is a personal decision.  If you are interested in exploring weight loss strategies, the following discussion covers the impact on weight loss on sleep apnea  and the approaches that may be successful.    Weight loss decreases severity of sleep apnea in most people with obesity. For those with mild obesity who have developed snoring with weight gain, even 15-30 pound weight loss can improve and occasionally eliminate sleep apnea.  Structured and life-long dietary and health habits are necessary to lose weight and keep healthier weight levels.     Though there may be significant health benefits from weight loss, long-term weight loss is very difficult to achieve- studies show success with dietary management in less than 10% of people. In addition, substantial weight loss may require years of dietary control and may be difficult if patients have severe obesity. In these cases, surgical management may be considered.  Finally, older individuals who have tolerated obesity without health complications may be less likely to benefit from weight loss strategies.      Your BMI is Body mass index is 35.22 kg/(m^2).  Weight management is a personal decision.  If you are interested in exploring weight loss strategies, the following discussion covers the approaches that may be successful. Body mass index (BMI) is one way to tell whether you are at a healthy weight, overweight, or obese. It measures your weight in relation to your height.  A BMI of 18.5 to 24.9 is in the healthy range. A person with a BMI of 25 to 29.9 is considered overweight, and someone with a BMI of 30 or greater is considered obese. More than two-thirds of American adults are considered overweight or obese.  Being overweight or obese increases the risk for further weight gain. Excess weight may lead to heart disease and diabetes.  Creating and following plans for healthy eating and physical activity may help you improve your health.  Weight control is part of healthy lifestyle and includes exercise, emotional health, and healthy eating habits. Careful eating habits lifelong are the mainstay of weight control.  Though there are significant health benefits from weight loss, long-term weight loss with diet alone may be very difficult to achieve- studies show long-term success with dietary management in less than 10% of people. Attaining a healthy weight may be especially difficult to achieve in those with severe obesity. In some cases, medications, devices and surgical management might be considered.  What can you do?  If you are overweight or obese and are interested in methods for weight loss, you should discuss this with your provider.     Consider reducing daily calorie intake by 500 calories.     Keep a food journal.     Avoiding skipping meals, consider cutting portions instead.    Diet combined with exercise helps maintain muscle while optimizing fat loss. Strength training is particularly important for building and maintaining muscle mass. Exercise helps reduce stress, increase energy, and improves fitness. Increasing exercise without diet control, however, may not burn enough calories to loose weight.       Start walking three days a week 10-20 minutes at a time    Work towards walking thirty minutes five days a week     Eventually, increase the speed of your walking for 1-2 minutes at time    In addition, we recommend that you review healthy lifestyles and methods for weight loss available through the National Institutes of Health patient information sites:  http://win.niddk.nih.gov/publications/index.htm    And look into health and wellness programs that may be available through your health insurance provider, employer, local community center, or mar club.    Weight management plan: Patient was referred to their PCP to discuss a diet and exercise plan.    Surgery:    Surgery for obstructive sleep apnea is considered generally only when other therapies fail to work. Surgery may be discussed with you if you are having a difficult time tolerating CPAP and or when there is an abnormal structure that requires  surgical correction.  Nose and throat surgeries often enlarge the airway to prevent collapse.  Most of these surgeries create pain for 1-2 weeks and up to half of the most common surgeries are not effective throughout life.  You should carefully discuss the benefits and drawbacks to surgery with your sleep provider and surgeon to determine if it is the best solution for you.   More information  Surgery for BRIT is directed at areas that are responsible for narrowing or complete obstruction of the airway during sleep.  There are a wide range of procedures available to enlarge and/or stabilize the airway to prevent blockage of breathing in the three major areas where it can occur: the palate, tongue, and nasal regions.  Successful surgical treatment depends on the accurate identification of the factors responsible for obstructive sleep apnea in each person.  A personalized approach is required because there is no single treatment that works well for everyone.  Because of anatomic variation, consultation with an examination by a sleep surgeon is a critical first step in determining what surgical options are best for each patient.  In some cases, examination during sedation may be recommended in order to guide the selection of procedures.  Patients will be counseled about risks and benefits as well as the typical recovery course after surgery. Surgery is typically not a cure for a person s BRIT.  However, surgery will often significantly improve one s BRIT severity (termed  success rate ).  Even in the absence of a cure, surgery will decrease the cardiovascular risk associated with OSA7; improve overall quality of life8 (sleepiness, functionality, sleep quality, etc).  Palate Procedures:  Patients with BRIT often have narrowing of their airway in the region of their tonsils and uvula.  The goals of palate procedures are to widen the airway in this region as well as to help the tissues resist collapse.  Modern palate  procedure techniques focus on tissue conservation and soft tissue rearrangement, rather than tissue removal.  Often the uvula is preserved in this procedure. Residual sleep apnea is common in patient after pharyngoplasty with an average reduction in sleep apnea events of 33%2.    Tongue Procedures:  ExamWhile patients are awake, the muscles that surround the throat are active and keep this region open for breathing. These muscles relax during sleep, allowing the tongue and other structures to collapse and block breathing.  There are several different tongue procedures available.  Selection of a tongue base procedure depends on characteristics seen on physical exam.  Generally, procedures are aimed at removing bulky tissues in this area or preventing the back of the tongue from falling back during sleep.  Success rates for tongue surgery range from 50-62%3.  Hypoglossal Nerve Stimulation:  Hypoglossal nerve stimulation has recently received approval from the [place] Food and Drug Administration for the treatment of obstructive sleep apnea.  This is based on research showing that the system was safe and effective in treating sleep apnea6.  Results showed that the median AHI score decreased 68%, from 29.3 to 9.0. This therapy uses an implant system that senses breathing patterns and delivers mild stimulation to airway muscles, which keeps the airway open during sleep.  The system consists of three fully implanted components: a small generator (similar in size to a pacemaker), a breathing sensor, and a stimulation lead.  Using a small handheld remote, a patient turns the therapy on before bed and off upon awakening.    Candidates for this device must be greater than 22 years of age, have moderate to severe BRIT (AHI between 20-65), BMI less than 32, have tried CPAP/oral appliance without success, and have appropriate upper airway anatomy (determined by a sleep endoscopy performed by Dr. Ramos).  Hypoglossal Nerve  Stimulation Pathway:    The sleep surgeon s office will work with the patient through the insurance prior-authorization process (including communications and appeals).    Nasal Procedures:  Nasal obstruction can interfere with nasal breathing during the day and night.  Studies have shown that relief of nasal obstruction can improve the ability of some patients to tolerate positive airway pressure therapy for obstructive sleep apnea1.  Treatment options include medications such as nasal saline, topical corticosteroid and antihistamine sprays, and oral medications such as antihistamines or decongestants. Non-surgical treatments can include external nasal dilators for selected patients. If these are not successful by themselves, surgery can improve the nasal airway either alone or in combination with these other options.  Combination Procedures:  Combination of surgical procedures and other treatments may be recommended, particularly if patients have more than one area of narrowing or persistent positional disease.  The success rate of combination surgery ranges from 66-80%2,3.    References  1. Radha HENRY. The Role of the Nose in Snoring and Obstructive Sleep Apnoea: An Update.  Eur Arch Otorhinolaryngol. 2011; 268: 1365-73.  2.  Evaristo SM; Ricarda JA; Samina JR; Pallanch JF; Geo MB; Tootie SG; Yogesh CABALLERO. Surgical modifications of the upper airway for obstructive sleep apnea in adults: a systematic review and meta-analysis. SLEEP 2010;33(10):5749-0975. Ariella SRIVASTAVA. Hypopharyngeal surgery in obstructive sleep apnea: an evidence-based medicine review.  Arch Otolaryngol Head Neck Surg. 2006 Feb;132(2):206-13.  3. Suhail YH1, Kimberly Y, Wong TJ. The efficacy of anatomically based multilevel surgery for obstructive sleep apnea. Otolaryngol Head Neck Surg. 2003 Oct;129(4):327-35.  4. Ariella SRIVASTAVA, Goldberg A. Hypopharyngeal Surgery in Obstructive Sleep Apnea: An Evidence-Based Medicine Review. Arch Otolaryngol Head Neck Surg.  2006 Feb;132(2):206-13.  5. Jae PJ et al. Upper-Airway Stimulation for Obstructive Sleep Apnea.  N Engl J Med. 2014 Jan 9;370(2):139-49.  6. David Y et al. Increased Incidence of Cardiovascular Disease in Middle-aged Men with Obstructive Sleep Apnea. Am J Respir Crit Care Med; 2002 166: 159-165  7. Flash HANKINS et al. Studying Life Effects and Effectiveness of Palatopharyngoplasty (SLEEP) study: Subjective Outcomes of Isolated Uvulopalatopharyngoplasty. Otolaryngol Head Neck Surg. 2011; 144: 623-631.                 36.9

## 2021-09-04 ENCOUNTER — HEALTH MAINTENANCE LETTER (OUTPATIENT)
Age: 61
End: 2021-09-04

## 2021-09-24 ENCOUNTER — OFFICE VISIT (OUTPATIENT)
Dept: FAMILY MEDICINE | Facility: CLINIC | Age: 61
End: 2021-09-24
Payer: COMMERCIAL

## 2021-09-24 VITALS
OXYGEN SATURATION: 100 % | DIASTOLIC BLOOD PRESSURE: 80 MMHG | SYSTOLIC BLOOD PRESSURE: 120 MMHG | BODY MASS INDEX: 34.19 KG/M2 | WEIGHT: 185.8 LBS | HEART RATE: 60 BPM | HEIGHT: 62 IN | RESPIRATION RATE: 8 BRPM | TEMPERATURE: 97.7 F

## 2021-09-24 DIAGNOSIS — I10 BENIGN ESSENTIAL HYPERTENSION: ICD-10-CM

## 2021-09-24 DIAGNOSIS — Z00.00 ROUTINE GENERAL MEDICAL EXAMINATION AT A HEALTH CARE FACILITY: ICD-10-CM

## 2021-09-24 LAB
ERYTHROCYTE [DISTWIDTH] IN BLOOD BY AUTOMATED COUNT: 13.6 % (ref 10–15)
HCT VFR BLD AUTO: 40.1 % (ref 35–47)
HGB BLD-MCNC: 13.3 G/DL (ref 11.7–15.7)
MCH RBC QN AUTO: 28.9 PG (ref 26.5–33)
MCHC RBC AUTO-ENTMCNC: 33.2 G/DL (ref 31.5–36.5)
MCV RBC AUTO: 87 FL (ref 78–100)
PLATELET # BLD AUTO: 315 10E3/UL (ref 150–450)
RBC # BLD AUTO: 4.6 10E6/UL (ref 3.8–5.2)
WBC # BLD AUTO: 4.9 10E3/UL (ref 4–11)

## 2021-09-24 PROCEDURE — 80061 LIPID PANEL: CPT | Performed by: FAMILY MEDICINE

## 2021-09-24 PROCEDURE — 80053 COMPREHEN METABOLIC PANEL: CPT | Performed by: FAMILY MEDICINE

## 2021-09-24 PROCEDURE — 85027 COMPLETE CBC AUTOMATED: CPT | Performed by: FAMILY MEDICINE

## 2021-09-24 PROCEDURE — 90682 RIV4 VACC RECOMBINANT DNA IM: CPT | Performed by: FAMILY MEDICINE

## 2021-09-24 PROCEDURE — 99396 PREV VISIT EST AGE 40-64: CPT | Mod: 25 | Performed by: FAMILY MEDICINE

## 2021-09-24 PROCEDURE — 90471 IMMUNIZATION ADMIN: CPT | Performed by: FAMILY MEDICINE

## 2021-09-24 PROCEDURE — 36415 COLL VENOUS BLD VENIPUNCTURE: CPT | Performed by: FAMILY MEDICINE

## 2021-09-24 RX ORDER — LOSARTAN POTASSIUM 100 MG/1
100 TABLET ORAL DAILY
Qty: 90 TABLET | Refills: 1 | Status: SHIPPED | OUTPATIENT
Start: 2021-09-24 | End: 2022-03-25

## 2021-09-24 ASSESSMENT — ENCOUNTER SYMPTOMS
SORE THROAT: 0
WEAKNESS: 0
HEMATOCHEZIA: 0
SHORTNESS OF BREATH: 0
NAUSEA: 0
ABDOMINAL PAIN: 0
CONSTIPATION: 1
EYE PAIN: 0
COUGH: 0
DYSURIA: 0
FREQUENCY: 0
NERVOUS/ANXIOUS: 0
DIZZINESS: 0
BREAST MASS: 0
JOINT SWELLING: 0
PALPITATIONS: 0
CHILLS: 0
HEARTBURN: 0
HEMATURIA: 0
PARESTHESIAS: 0
HEADACHES: 0
ARTHRALGIAS: 1
DIARRHEA: 0
MYALGIAS: 0
FEVER: 0

## 2021-09-24 ASSESSMENT — PAIN SCALES - GENERAL: PAINLEVEL: NO PAIN (0)

## 2021-09-24 ASSESSMENT — MIFFLIN-ST. JEOR: SCORE: 1369.28

## 2021-09-24 NOTE — PROGRESS NOTES
SUBJECTIVE:   CC: Theresa M Delosreyes is an 61 year old woman who presents for preventive health visit.     Patient has been advised of split billing requirements and indicates understanding: Yes  Healthy Habits:     Getting at least 3 servings of Calcium per day:  Yes    Bi-annual eye exam:  NO    Dental care twice a year:  Yes    Sleep apnea or symptoms of sleep apnea:  Sleep apnea    Diet:  Regular (no restrictions)    Frequency of exercise:  2-3 days/week    Duration of exercise:  15-30 minutes    Taking medications regularly:  No    Medication side effects:  None    PHQ-2 Total Score: 0    Additional concerns today:  No          Today's PHQ-2 Score:   PHQ-2 ( 1999 Pfizer) 9/24/2021   Q1: Little interest or pleasure in doing things 0   Q2: Feeling down, depressed or hopeless 0   PHQ-2 Score 0   Q1: Little interest or pleasure in doing things Not at all   Q2: Feeling down, depressed or hopeless Not at all   PHQ-2 Score 0       Abuse: Current or Past (Physical, Sexual or Emotional) - No  Do you feel safe in your environment? Yes        Social History     Tobacco Use     Smoking status: Never Smoker     Smokeless tobacco: Never Used   Substance Use Topics     Alcohol use: No     Alcohol/week: 0.0 standard drinks     If you drink alcohol do you typically have >3 drinks per day or >7 drinks per week? No    Alcohol Use 9/24/2021   Prescreen: >3 drinks/day or >7 drinks/week? No   Prescreen: >3 drinks/day or >7 drinks/week? -   No flowsheet data found.    Reviewed orders with patient.  Reviewed health maintenance and updated orders accordingly - Yes  BP Readings from Last 3 Encounters:   09/24/21 120/80   10/12/20 94/66   08/24/20 132/80    Wt Readings from Last 3 Encounters:   09/24/21 84.3 kg (185 lb 12.8 oz)   10/12/20 83.5 kg (184 lb)   08/24/20 85.3 kg (188 lb)                  Patient Active Problem List   Diagnosis     Female stress incontinence     Status post laparoscopic cholecystectomy     Osteoarthritis  "of both knees     Obesity (BMI 30-39.9)     BRIT (obstructive sleep apnea)     Hyperlipidemia LDL goal <130     Hypertension goal BP (blood pressure) < 140/90     Chest pain     Infiltrating ductal carcinoma of right female breast (H)     Antineoplastic antibiotics causing adverse effect in therapeutic use     Breast cancer (H)     Benign essential hypertension     Drugs and medicinal substances causing adverse effect in therapeutic use, initial encounter     Obesity (BMI 35.0-39.9) with comorbidity (H)     Past Surgical History:   Procedure Laterality Date     COLONOSCOPY  5/2006    ormal to lt transverse colon, incomplete due to \"extremely redundant colon\"\"     COLONOSCOPY N/A 10/12/2020    Procedure: COLONOSCOPY;  Surgeon: Rebecca Tomlinson MD;  Location:  GI     D & C  9/2004     EXTRACT LENS CLEAR, EXCHANGE LENS REFRACTIVE, COMBINED  3/2013    bilateral     GRAFT FAT TO BREAST Bilateral 11/18/2016    Procedure: GRAFT FAT TO BREAST;  Surgeon: Alhaji Patton MD;  Location:  [facility]     GRAFT FAT TO BREAST  12/14/2018    Procedure: GRAFT FAT  FROM ABDOMEN TO BILATERAL  BREAST;  Surgeon: Alhaji Patton MD;  Location:  [facility]     GRAFT FREE VASCULARIZED TRANSVERSE RECTUS ABDOMINIS MYOCUTANEOUS Bilateral 4/12/2016    Procedure: GRAFT FREE VASCULARIZED TRANSVERSE RECTUS ABDOMINIS MYOCUTANEOUS;  Surgeon: Alhaji Patton MD;  Location:  OR     HC ABLATION, ENDOMETRIAL, THERMAL, W/O HYSTEROSCOPIC GUIDANCE  11/2004    NovaSure endometrial ablation.      HC COLON AIR CONTRAST  5/2006    normal     HC SLING OPERATION FOR STRESS INCONTINENCE  1/2007    [facility]arc sling procedure     INSERT PORT VASCULAR ACCESS Left 11/9/2015    Procedure: INSERT PORT VASCULAR ACCESS;  Surgeon: August Rdz MD;  Location:  [facility]     INSERT TISSUE EXPANDER BREAST BILATERAL Bilateral 9/29/2015    Procedure: INSERT TISSUE EXPANDER BREAST BILATERAL;  Surgeon: Alhaji Patton MD;  Location:  OR     " LAPAROSCOPIC CHOLECYSTECTOMY WITH CHOLANGIOGRAMS  1/2005    Laparoscopic cholecystectomy with intraoperative  cholangiogram     MASTECTOMY SIMPLE BILATERAL, SENTINEL NODE BILATERAL, COMBINED Bilateral 9/29/2015    Procedure: COMBINED MASTECTOMY SIMPLE BILATERAL, SENTINEL NODE BILATERAL;  Surgeon: August Rdz MD;  Location:  OR     ORTHOPEDIC SURGERY      left rotator cuff repair     RECONSTRUCT BREAST BILATERAL Bilateral 11/18/2016    Procedure: RECONSTRUCT BREAST BILATERAL;  Surgeon: Alhaji Patton MD;  Location: [facility]     REMOVE PORT VASCULAR ACCESS N/A 11/18/2016    Procedure: REMOVE PORT VASCULAR ACCESS;  Surgeon: Alhaji Patton MD;  Location:  [facility]     REMOVE TISSUE EXPANDER BREAST Bilateral 4/12/2016    Procedure: REMOVE TISSUE EXPANDER BREAST;  Surgeon: lAhaji Patton MD;  Location:  OR     REVISE RECONSTRUCTED BREAST BILATERAL Bilateral 12/14/2018    Procedure: REVISION OF BILATERAL BREAST RECONSTRUCTION ( VASER );  Surgeon: Alhaji Patton MD;  Location: [facility]     REVISE SCAR TRUNK N/A 11/18/2016    Procedure: REVISE SCAR TRUNK;  Surgeon: Alhaji Patton MD;  Location: [facility]     TONSILLECTOMY  2001     VASCULAR SURGERY      insert port and removed       Social History     Tobacco Use     Smoking status: Never Smoker     Smokeless tobacco: Never Used   Substance Use Topics     Alcohol use: No     Alcohol/week: 0.0 standard drinks     Family History   Problem Relation Age of Onset     Asthma Daughter      C.A.D. Father 40        MI     Cerebrovascular Disease Father      Hypertension Mother      Breast Cancer Mother      Thyroid Disease Mother         hypothyroidism     Sleep Apnea Mother      Breast Cancer Sister      Hypertension Sister      Hypertension Brother      Heart Disease Brother         pacemaker     Sleep Apnea Brother      Hypertension Brother      Diabetes Brother         type 2     Sleep Apnea Brother      Myocardial Infarction  Maternal Grandmother      Diabetes Paternal Grandmother      Unknown/Adopted Maternal Grandfather      Unknown/Adopted Paternal Grandfather      Diabetes Maternal Aunt      Cancer - colorectal Maternal Uncle      Diabetes Maternal Aunt         with retinopathy leading to blindness         Current Outpatient Medications   Medication Sig Dispense Refill     aspirin 81 MG EC tablet Take 81 mg by mouth daily       Cyanocobalamin (VITAMIN B12 PO) Take by mouth daily       losartan (COZAAR) 100 MG tablet Take 1 tablet (100 mg) by mouth daily 90 tablet 1     VITAMIN D, CHOLECALCIFEROL, PO Take 3,000 Units by mouth daily       carvedilol (COREG) 25 MG tablet TAKE ONE TABLET (25 MG) BY MOUTH TWICE A DAY WITH MEALS (Patient not taking: Reported on 9/24/2021) 180 tablet 0     gabapentin (NEURONTIN) 100 MG capsule Take 1 capsule (100 mg) by mouth 2 times daily (Patient not taking: Reported on 9/24/2021) 20 capsule 0     valACYclovir (VALTREX) 1000 mg tablet Take 1 tablet (1,000 mg) by mouth 2 times daily for 10 days 20 tablet 0     Allergies   Allergen Reactions     Definity Other (See Comments)     A substance that helps with imaging the heart muscle with echocardiology    Full body ache starting in the low back.      Keflex [Cephalexin]      Hand swelling, blisters???     Recent Labs   Lab Test 08/24/20  0950 06/17/19  1051 06/07/19  0000 01/28/19  1543 11/30/18  1036 11/30/18  1036 10/04/18  1842 06/01/18  0953 09/21/15  1531 04/03/15  0717 09/11/14  1656 08/08/14  0729   * 104*  --   --   --   --   --  100*   < > 84  --    < >   HDL 66 67  --   --   --   --   --  54   < > 62  --    < >   TRIG 102 146  --   --   --   --   --  126   < > 119  --    < >   ALT 23  --  25 25  --   --    < > 48   < >  --   --   --    CR 0.61  --  0.65  --    < > 0.76  --  0.68   < > 0.64  --    < >   GFRESTIMATED >90  --  98.1  --    < > 78  --  89   < > >90  Non  GFR Calc    --    < >   GFRESTBLACK >90  --   --   --   --   ">90  --  >90   < > >90  African American GFR Calc    --    < >   POTASSIUM 4.0  --  4.2  --    < > 4.0  --  4.1   < > 3.6  --    < >   TSH  --   --   --   --   --   --   --   --   --  2.34 2.42  --     < > = values in this interval not displayed.        Breast Cancer Screening:  Any new diagnosis of family breast, ovarian, or bowel cancer? Yes       FHS-7: No flowsheet data found.    Mammogram Screening - Mammography discussed, not appropriate due to radical double mastectomy   Pertinent mammograms are reviewed under the imaging tab.    History of abnormal Pap smear: NO - age 30- 65 PAP every 3 years recommended     Reviewed and updated as needed this visit by clinical staff   Allergies               Reviewed and updated as needed this visit by Provider                Past Medical History:   Diagnosis Date     Benign hypertension     started medication 3/13     Cancer (H)     breast cancer, right     Chest pain     side effect of Femera     Female stress incontinence 2006    s/p sling procedure     Mixed hyperlipidemia      Motion sickness      Obesity (BMI 30-39.9)      BRIT (obstructive sleep apnea)     not using CPAP, treated with tonsilliectomy     Osteoarthritis of both knees      PONV (postoperative nausea and vomiting)      Status post laparoscopic cholecystectomy 2005    Cholelithiasis with mild subacute hemorrhagic cholecystitis     Supervision of other normal pregnancy      - vaginal     Uncomplicated asthma     with respiratory infections      Past Surgical History:   Procedure Laterality Date     COLONOSCOPY  2006    ormal to lt transverse colon, incomplete due to \"extremely redundant colon\"\"     COLONOSCOPY N/A 10/12/2020    Procedure: COLONOSCOPY;  Surgeon: Rebecca Tomlinson MD;  Location:  GI     D & C  2004     EXTRACT LENS CLEAR, EXCHANGE LENS REFRACTIVE, COMBINED  3/2013    bilateral     GRAFT FAT TO BREAST Bilateral 2016    Procedure: GRAFT FAT TO BREAST; "  Surgeon: Alhaji Patton MD;  Location:  [facility]     GRAFT FAT TO BREAST  12/14/2018    Procedure: GRAFT FAT  FROM ABDOMEN TO BILATERAL  BREAST;  Surgeon: Alhaji Patton MD;  Location:  [facility]     GRAFT FREE VASCULARIZED TRANSVERSE RECTUS ABDOMINIS MYOCUTANEOUS Bilateral 4/12/2016    Procedure: GRAFT FREE VASCULARIZED TRANSVERSE RECTUS ABDOMINIS MYOCUTANEOUS;  Surgeon: Alhaji Patton MD;  Location:  OR     HC ABLATION, ENDOMETRIAL, THERMAL, W/O HYSTEROSCOPIC GUIDANCE  11/2004    NovaSure endometrial ablation.      HC COLON AIR CONTRAST  5/2006    normal     HC SLING OPERATION FOR STRESS INCONTINENCE  1/2007    [facility] sling procedure     INSERT PORT VASCULAR ACCESS Left 11/9/2015    Procedure: INSERT PORT VASCULAR ACCESS;  Surgeon: August Rdz MD;  Location:  [facility]     INSERT TISSUE EXPANDER BREAST BILATERAL Bilateral 9/29/2015    Procedure: INSERT TISSUE EXPANDER BREAST BILATERAL;  Surgeon: Alhaji Patton MD;  Location:  OR     LAPAROSCOPIC CHOLECYSTECTOMY WITH CHOLANGIOGRAMS  1/2005    Laparoscopic cholecystectomy with intraoperative  cholangiogram     MASTECTOMY SIMPLE BILATERAL, SENTINEL NODE BILATERAL, COMBINED Bilateral 9/29/2015    Procedure: COMBINED MASTECTOMY SIMPLE BILATERAL, SENTINEL NODE BILATERAL;  Surgeon: August Rdz MD;  Location:  OR     ORTHOPEDIC SURGERY      left rotator cuff repair     RECONSTRUCT BREAST BILATERAL Bilateral 11/18/2016    Procedure: RECONSTRUCT BREAST BILATERAL;  Surgeon: Alhaji Patton MD;  Location:  [facility]     REMOVE PORT VASCULAR ACCESS N/A 11/18/2016    Procedure: REMOVE PORT VASCULAR ACCESS;  Surgeon: Alhaji Patton MD;  Location:  [facility]     REMOVE TISSUE EXPANDER BREAST Bilateral 4/12/2016    Procedure: REMOVE TISSUE EXPANDER BREAST;  Surgeon: Alhaji Patton MD;  Location:  OR     REVISE RECONSTRUCTED BREAST BILATERAL Bilateral 12/14/2018    Procedure: REVISION OF BILATERAL BREAST  "RECONSTRUCTION ( VASER );  Surgeon: Alhaji Patton MD;  Location: [facility]     REVISE SCAR TRUNK N/A 11/18/2016    Procedure: REVISE SCAR TRUNK;  Surgeon: Alhaji Patton MD;  Location: [facility]     TONSILLECTOMY  2001     VASCULAR SURGERY      insert port and removed       Review of Systems   Constitutional: Negative for chills and fever.   HENT: Negative for congestion, ear pain, hearing loss and sore throat.    Eyes: Negative for pain and visual disturbance.   Respiratory: Negative for cough and shortness of breath.    Cardiovascular: Negative for chest pain, palpitations and peripheral edema.   Gastrointestinal: Positive for constipation. Negative for abdominal pain, diarrhea, heartburn, hematochezia and nausea.   Breasts:  Negative for tenderness, breast mass and discharge.   Genitourinary: Negative for dysuria, frequency, genital sores, hematuria, pelvic pain, urgency, vaginal bleeding and vaginal discharge.   Musculoskeletal: Positive for arthralgias. Negative for joint swelling and myalgias.   Skin: Negative for rash.   Neurological: Negative for dizziness, weakness, headaches and paresthesias.   Psychiatric/Behavioral: Negative for mood changes. The patient is not nervous/anxious.           OBJECTIVE:   /80   Pulse 60   Temp 97.7  F (36.5  C) (Tympanic)   Resp 8   Ht 1.58 m (5' 2.21\")   Wt 84.3 kg (185 lb 12.8 oz)   LMP 02/23/2013   SpO2 100%   BMI 33.76 kg/m    Physical Exam  GENERAL: healthy, alert and no distress  EYES: Eyes grossly normal to inspection, PERRL and conjunctivae and sclerae normal  HENT: ear canals and TM's normal, nose and mouth without ulcers or lesions  NECK: no adenopathy, no asymmetry, masses, or scars and thyroid normal to palpation  RESP: lungs clear to auscultation - no rales, rhonchi or wheezes  CV: regular rate and rhythm, normal S1 S2, no S3 or S4, no murmur, click or rub, no peripheral edema and peripheral pulses strong  ABDOMEN: soft, nontender, no " "hepatosplenomegaly, no masses and bowel sounds normal  MS: no gross musculoskeletal defects noted, no edema  SKIN: no suspicious lesions or rashes  NEURO: Normal strength and tone, mentation intact and speech normal        ASSESSMENT/PLAN:       ICD-10-CM    1. Routine general medical examination at a health care facility  Z00.00 INFLUENZA QUAD, RECOMBINANT, P-FREE (RIV4) (FLUBLOK)     REVIEW OF HEALTH MAINTENANCE PROTOCOL ORDERS     CBC with platelets     Comprehensive metabolic panel (BMP + Alb, Alk Phos, ALT, AST, Total. Bili, TP)     Lipid panel reflex to direct LDL Fasting   2. Benign essential hypertension  I10 losartan (COZAAR) 100 MG tablet       Patient has been advised of split billing requirements and indicates understanding: Yes  COUNSELING:  Reviewed preventive health counseling, as reflected in patient instructions    Estimated body mass index is 32.59 kg/m  as calculated from the following:    Height as of 10/12/20: 1.6 m (5' 3\").    Weight as of 10/12/20: 83.5 kg (184 lb).    Weight management plan: Discussed healthy diet and exercise guidelines    She reports that she has never smoked. She has never used smokeless tobacco.      Counseling Resources:  ATP IV Guidelines  Pooled Cohorts Equation Calculator  Breast Cancer Risk Calculator  BRCA-Related Cancer Risk Assessment: FHS-7 Tool  FRAX Risk Assessment  ICSI Preventive Guidelines  Dietary Guidelines for Americans, 2010  USDA's MyPlate  ASA Prophylaxis  Lung CA Screening    Dilan Soto MD  [facility]  "

## 2021-09-25 LAB
ALBUMIN SERPL-MCNC: 4.1 G/DL (ref 3.4–5)
ALP SERPL-CCNC: 83 U/L (ref 40–150)
ALT SERPL W P-5'-P-CCNC: 30 U/L (ref 0–50)
ANION GAP SERPL CALCULATED.3IONS-SCNC: 4 MMOL/L (ref 3–14)
AST SERPL W P-5'-P-CCNC: 15 U/L (ref 0–45)
BILIRUB SERPL-MCNC: 1.1 MG/DL (ref 0.2–1.3)
BUN SERPL-MCNC: 11 MG/DL (ref 7–30)
CALCIUM SERPL-MCNC: 9.3 MG/DL (ref 8.5–10.1)
CHLORIDE BLD-SCNC: 106 MMOL/L (ref 94–109)
CHOLEST SERPL-MCNC: 205 MG/DL
CO2 SERPL-SCNC: 27 MMOL/L (ref 20–32)
CREAT SERPL-MCNC: 0.76 MG/DL (ref 0.52–1.04)
FASTING STATUS PATIENT QL REPORTED: YES
GFR SERPL CREATININE-BSD FRML MDRD: 86 ML/MIN/1.73M2
GLUCOSE BLD-MCNC: 92 MG/DL (ref 70–99)
HDLC SERPL-MCNC: 56 MG/DL
LDLC SERPL CALC-MCNC: 123 MG/DL
NONHDLC SERPL-MCNC: 149 MG/DL
POTASSIUM BLD-SCNC: 4.3 MMOL/L (ref 3.4–5.3)
PROT SERPL-MCNC: 7.9 G/DL (ref 6.8–8.8)
SODIUM SERPL-SCNC: 137 MMOL/L (ref 133–144)
TRIGL SERPL-MCNC: 128 MG/DL

## 2022-02-17 PROBLEM — T50.905A DRUG OR MEDICINAL SUBSTANCE CAUSING ADVERSE EFFECT IN THERAPEUTIC USE, INITIAL ENCOUNTER: Status: ACTIVE | Noted: 2017-05-19

## 2022-02-23 ENCOUNTER — TELEPHONE (OUTPATIENT)
Dept: FAMILY MEDICINE | Facility: CLINIC | Age: 62
End: 2022-02-23

## 2022-02-23 NOTE — LETTER
February 23, 2022      Theresa M Delosreyes  [address]  [facility] [place] [address]        Dear Melina,    I care about your health and have reviewed your health plan. I have reviewed your medical conditions, medication list, and lab results and am making recommendations based on this review, to better manage your health.    You are in particular need of attention regarding:  -Cervical Cancer Screening    I am recommending that you:  -schedule a PAP SMEAR EXAM which is due.  Please disregard this reminder if you have had this exam elsewhere within the last year.  It would be helpful for us to have a copy of your recent pap smear report in our file so that we can best coordinate your care.    Here is a list of Health Maintenance topics that are due now or due soon:  Health Maintenance Due   Topic Date Due     COVID-19 Vaccine (3 - Booster for Pfizer series) 09/06/2021     Diptheria Tetanus Pertussis (DTAP/TDAP/TD) Vaccine (2 - Td or Tdap) 11/08/2021     PHQ-2  01/01/2022     PAP Smear  01/01/2022       Please call us at [phone] (or use [employer]) to address the above recommendations.     Thank you for trusting [facility] and we appreciate the opportunity to serve you.  We look forward to supporting your healthcare needs in the future.    Healthy Regards,    Dilan Soto MD

## 2022-02-23 NOTE — TELEPHONE ENCOUNTER
Patient Quality Outreach    Patient is due for the following:   Cervical Cancer Screening - PAP Needed    NEXT STEPS:   Schedule a yearly physical    Type of outreach:    Sent letter.      Questions for provider review:         Sanam Coates CMA

## 2022-03-25 DIAGNOSIS — I10 BENIGN ESSENTIAL HYPERTENSION: ICD-10-CM

## 2022-03-25 RX ORDER — LOSARTAN POTASSIUM 100 MG/1
TABLET ORAL
Qty: 90 TABLET | Refills: 1 | Status: SHIPPED | OUTPATIENT
Start: 2022-03-25 | End: 2022-04-29

## 2022-03-25 NOTE — TELEPHONE ENCOUNTER
Prescription approved per [facility] Refill Protocol.    Akilah NOYOLA RN  EP Triage

## 2022-04-18 ENCOUNTER — TRANSFERRED RECORDS (OUTPATIENT)
Dept: HEALTH INFORMATION MANAGEMENT | Facility: CLINIC | Age: 62
End: 2022-04-18
Payer: COMMERCIAL

## 2022-04-18 LAB
HPV ABSTRACT: NORMAL
PAP-ABSTRACT: NORMAL

## 2022-04-29 ENCOUNTER — OFFICE VISIT (OUTPATIENT)
Dept: FAMILY MEDICINE | Facility: CLINIC | Age: 62
End: 2022-04-29
Payer: COMMERCIAL

## 2022-04-29 VITALS
HEIGHT: 62 IN | BODY MASS INDEX: 35.7 KG/M2 | DIASTOLIC BLOOD PRESSURE: 74 MMHG | SYSTOLIC BLOOD PRESSURE: 116 MMHG | OXYGEN SATURATION: 97 % | TEMPERATURE: 97.6 F | WEIGHT: 194 LBS | RESPIRATION RATE: 18 BRPM | HEART RATE: 69 BPM

## 2022-04-29 DIAGNOSIS — C50.919 MALIGNANT NEOPLASM OF FEMALE BREAST, UNSPECIFIED ESTROGEN RECEPTOR STATUS, UNSPECIFIED LATERALITY, UNSPECIFIED SITE OF BREAST (H): ICD-10-CM

## 2022-04-29 DIAGNOSIS — I10 BENIGN ESSENTIAL HYPERTENSION: Primary | ICD-10-CM

## 2022-04-29 DIAGNOSIS — E66.01 MORBID OBESITY (H): ICD-10-CM

## 2022-04-29 LAB — CANCER AG125 SERPL-ACNC: 8 U/ML (ref 0–30)

## 2022-04-29 PROCEDURE — 99214 OFFICE O/P EST MOD 30 MIN: CPT | Performed by: FAMILY MEDICINE

## 2022-04-29 PROCEDURE — 80048 BASIC METABOLIC PNL TOTAL CA: CPT | Performed by: FAMILY MEDICINE

## 2022-04-29 PROCEDURE — 86301 IMMUNOASSAY TUMOR CA 19-9: CPT | Mod: 90 | Performed by: FAMILY MEDICINE

## 2022-04-29 PROCEDURE — 86304 IMMUNOASSAY TUMOR CA 125: CPT | Performed by: FAMILY MEDICINE

## 2022-04-29 PROCEDURE — 83721 ASSAY OF BLOOD LIPOPROTEIN: CPT | Performed by: FAMILY MEDICINE

## 2022-04-29 PROCEDURE — 99000 SPECIMEN HANDLING OFFICE-LAB: CPT | Performed by: FAMILY MEDICINE

## 2022-04-29 PROCEDURE — 36415 COLL VENOUS BLD VENIPUNCTURE: CPT | Performed by: FAMILY MEDICINE

## 2022-04-29 RX ORDER — LOSARTAN POTASSIUM 100 MG/1
100 TABLET ORAL DAILY
Qty: 90 TABLET | Refills: 1 | Status: SHIPPED | OUTPATIENT
Start: 2022-04-29 | End: 2023-03-01

## 2022-04-29 ASSESSMENT — PAIN SCALES - GENERAL: PAINLEVEL: NO PAIN (0)

## 2022-04-29 NOTE — PROGRESS NOTES
"  Assessment & Plan     Benign essential hypertension  Stable, continue monitoring   - Basic metabolic panel  (Ca, Cl, CO2, Creat, Gluc, K, Na, BUN); Future  - LDL cholesterol direct; Future  - losartan (COZAAR) 100 MG tablet; Take 1 tablet (100 mg) by mouth daily    Morbid obesity (H)  Not improving  Encouraged her to continue working on life style modification including low fat/carb diet with regular exercise   - Basic metabolic panel  (Ca, Cl, CO2, Creat, Gluc, K, Na, BUN); Future  - LDL cholesterol direct; Future    Malignant neoplasm of female breast, unspecified estrogen receptor status, unspecified laterality, unspecified site of breast (H)  Care transferred from oncology, will have her to check tumor marker today   - Basic metabolic panel  (Ca, Cl, CO2, Creat, Gluc, K, Na, BUN); Future  - ; Future  - Cancer antigen 19-9; Future  - LDL cholesterol direct; Future           BMI:   Estimated body mass index is 35.48 kg/m  as calculated from the following:    Height as of this encounter: 1.575 m (5' 2\").    Weight as of this encounter: 88 kg (194 lb).   Weight management plan: Discussed healthy diet and exercise guidelines    FUTURE APPOINTMENTS:       - Follow-up visit in 6 months for CPE    No follow-ups on file.    Dilan Soto MD  [facility]   Melina is a 61 year old who presents for the following health issues     History of Present Illness       Hypertension: She presents for follow up of hypertension.  She does not check blood pressure  regularly outside of the clinic. Outpatient blood pressures have not been over 140/90. She does not follow a low salt diet.     She eats 2-3 servings of fruits and vegetables daily.She consumes 0 sweetened beverage(s) daily.She exercises with enough effort to increase her heart rate 9 or less minutes per day.  She exercises with enough effort to increase her heart rate 3 or less days per week.   She is taking medications " "regularly.       Review of Systems   Constitutional, HEENT, cardiovascular, pulmonary, gi and gu systems are negative, except as otherwise noted.      Objective    /74   Pulse 69   Temp 97.6  F (36.4  C) (Temporal)   Resp 18   Ht 1.575 m (5' 2\")   Wt 88 kg (194 lb)   LMP 02/23/2013   SpO2 97%   BMI 35.48 kg/m    Body mass index is 35.48 kg/m .  Physical Exam   GENERAL: healthy, alert and no distress  NECK: no adenopathy, no asymmetry, masses, or scars and thyroid normal to palpation  RESP: lungs clear to auscultation - no rales, rhonchi or wheezes  CV: regular rate and rhythm, normal S1 S2, no S3 or S4, no murmur, click or rub, no peripheral edema and peripheral pulses strong  ABDOMEN: soft, nontender, no hepatosplenomegaly, no masses and bowel sounds normal  MS: no gross musculoskeletal defects noted, no edema                "

## 2022-04-30 LAB — CANCER AG19-9 SERPL IA-ACNC: <2 U/ML

## 2022-05-01 LAB
ANION GAP SERPL CALCULATED.3IONS-SCNC: 6 MMOL/L (ref 3–14)
BUN SERPL-MCNC: 18 MG/DL (ref 7–30)
CALCIUM SERPL-MCNC: 9.6 MG/DL (ref 8.5–10.1)
CHLORIDE BLD-SCNC: 106 MMOL/L (ref 94–109)
CO2 SERPL-SCNC: 27 MMOL/L (ref 20–32)
CREAT SERPL-MCNC: 0.74 MG/DL (ref 0.52–1.04)
GFR SERPL CREATININE-BSD FRML MDRD: >90 ML/MIN/1.73M2
GLUCOSE BLD-MCNC: 89 MG/DL (ref 70–99)
POTASSIUM BLD-SCNC: 4.5 MMOL/L (ref 3.4–5.3)
SODIUM SERPL-SCNC: 139 MMOL/L (ref 133–144)

## 2022-05-02 LAB — LDLC SERPL CALC-MCNC: 140 MG/DL

## 2022-05-05 ENCOUNTER — TELEPHONE (OUTPATIENT)
Dept: FAMILY MEDICINE | Facility: CLINIC | Age: 62
End: 2022-05-05

## 2022-05-05 NOTE — LETTER
May 5, 2022      Theresa M Delosreyes  [address]  [place] [address]        Dear Melina,    I care about your health and have reviewed your health plan. I have reviewed your medical conditions, medication list, and lab results and am making recommendations based on this review, to better manage your health.    You are in particular need of attention regarding:  -Cervical Cancer Screening    I am recommending that you:  -schedule a PAP SMEAR EXAM which is due.  Please disregard this reminder if you have had this exam elsewhere within the last year.  It would be helpful for us to have a copy of your recent pap smear report in our file so that we can best coordinate your care.    Here is a list of Health Maintenance topics that are due now or due soon:  Health Maintenance Due   Topic Date Due     Diptheria Tetanus Pertussis (DTAP/TDAP/TD) Vaccine (1 - Tdap) 11/08/2011     PAP Smear  01/01/2022       Please call us at [phone] (or use "[employer] to address the above recommendations.     Thank you for trusting [facility] and we appreciate the opportunity to serve you.  We look forward to supporting your healthcare needs in the future.    Healthy Regards,    Dilan Soto MD

## 2022-10-04 ASSESSMENT — ENCOUNTER SYMPTOMS
HEMATOCHEZIA: 0
JOINT SWELLING: 0
COUGH: 0
FEVER: 0
PALPITATIONS: 0
NAUSEA: 0
FREQUENCY: 0
CHILLS: 0
PARESTHESIAS: 0
CONSTIPATION: 0
HEARTBURN: 0
WEAKNESS: 0
SHORTNESS OF BREATH: 0
HEADACHES: 0
SORE THROAT: 0
BREAST MASS: 0
EYE PAIN: 0
DIARRHEA: 0
HEMATURIA: 0
NERVOUS/ANXIOUS: 0
DYSURIA: 0
ARTHRALGIAS: 0
ABDOMINAL PAIN: 0
DIZZINESS: 0
MYALGIAS: 0

## 2022-10-11 ENCOUNTER — OFFICE VISIT (OUTPATIENT)
Dept: FAMILY MEDICINE | Facility: CLINIC | Age: 62
End: 2022-10-11
Payer: COMMERCIAL

## 2022-10-11 ENCOUNTER — MYC MEDICAL ADVICE (OUTPATIENT)
Dept: FAMILY MEDICINE | Facility: CLINIC | Age: 62
End: 2022-10-11

## 2022-10-11 VITALS
RESPIRATION RATE: 16 BRPM | HEIGHT: 62 IN | WEIGHT: 196 LBS | BODY MASS INDEX: 36.07 KG/M2 | TEMPERATURE: 98.1 F | HEART RATE: 61 BPM | SYSTOLIC BLOOD PRESSURE: 128 MMHG | DIASTOLIC BLOOD PRESSURE: 74 MMHG | OXYGEN SATURATION: 99 %

## 2022-10-11 DIAGNOSIS — Z00.00 HEALTHCARE MAINTENANCE: Primary | ICD-10-CM

## 2022-10-11 DIAGNOSIS — I10 BENIGN ESSENTIAL HYPERTENSION: ICD-10-CM

## 2022-10-11 DIAGNOSIS — Z23 HIGH PRIORITY FOR 2019-NCOV VACCINE: ICD-10-CM

## 2022-10-11 DIAGNOSIS — R73.03 PRE-DIABETES: ICD-10-CM

## 2022-10-11 LAB
ERYTHROCYTE [DISTWIDTH] IN BLOOD BY AUTOMATED COUNT: 14 % (ref 10–15)
HCT VFR BLD AUTO: 39.9 % (ref 35–47)
HGB BLD-MCNC: 13.4 G/DL (ref 11.7–15.7)
MCH RBC QN AUTO: 29 PG (ref 26.5–33)
MCHC RBC AUTO-ENTMCNC: 33.6 G/DL (ref 31.5–36.5)
MCV RBC AUTO: 86 FL (ref 78–100)
PLATELET # BLD AUTO: 295 10E3/UL (ref 150–450)
RBC # BLD AUTO: 4.62 10E6/UL (ref 3.8–5.2)
WBC # BLD AUTO: 5 10E3/UL (ref 4–11)

## 2022-10-11 PROCEDURE — 90682 RIV4 VACC RECOMBINANT DNA IM: CPT | Performed by: FAMILY MEDICINE

## 2022-10-11 PROCEDURE — 80053 COMPREHEN METABOLIC PANEL: CPT | Performed by: FAMILY MEDICINE

## 2022-10-11 PROCEDURE — 99396 PREV VISIT EST AGE 40-64: CPT | Mod: 25 | Performed by: FAMILY MEDICINE

## 2022-10-11 PROCEDURE — 0124A COVID-19,PF,PFIZER BOOSTER BIVALENT: CPT | Performed by: FAMILY MEDICINE

## 2022-10-11 PROCEDURE — 80061 LIPID PANEL: CPT | Performed by: FAMILY MEDICINE

## 2022-10-11 PROCEDURE — 36415 COLL VENOUS BLD VENIPUNCTURE: CPT | Performed by: FAMILY MEDICINE

## 2022-10-11 PROCEDURE — 91312 COVID-19,PF,PFIZER BOOSTER BIVALENT: CPT | Performed by: FAMILY MEDICINE

## 2022-10-11 PROCEDURE — 90471 IMMUNIZATION ADMIN: CPT | Performed by: FAMILY MEDICINE

## 2022-10-11 PROCEDURE — 85027 COMPLETE CBC AUTOMATED: CPT | Performed by: FAMILY MEDICINE

## 2022-10-11 ASSESSMENT — ENCOUNTER SYMPTOMS
HEARTBURN: 0
MYALGIAS: 0
CONSTIPATION: 0
NERVOUS/ANXIOUS: 0
DYSURIA: 0
SORE THROAT: 0
CHILLS: 0
COUGH: 0
PARESTHESIAS: 0
HEADACHES: 0
EYE PAIN: 0
NAUSEA: 0
PALPITATIONS: 0
ARTHRALGIAS: 0
DIZZINESS: 0
ABDOMINAL PAIN: 0
BREAST MASS: 0
FREQUENCY: 0
SHORTNESS OF BREATH: 0
HEMATOCHEZIA: 0
FEVER: 0
DIARRHEA: 0
JOINT SWELLING: 0
WEAKNESS: 0
HEMATURIA: 0

## 2022-10-11 ASSESSMENT — PAIN SCALES - GENERAL: PAINLEVEL: NO PAIN (0)

## 2022-10-11 NOTE — PROGRESS NOTES
SUBJECTIVE:   CC: Melina is an 62 year old who presents for preventive health visit.       Patient has been advised of split billing requirements and indicates understanding: Yes  Healthy Habits:     Getting at least 3 servings of Calcium per day:  Yes    Bi-annual eye exam:  NO    Dental care twice a year:  Yes    Sleep apnea or symptoms of sleep apnea:  Sleep apnea    Diet:  Regular (no restrictions)    Frequency of exercise:  4-5 days/week    Duration of exercise:  15-30 minutes    Taking medications regularly:  Yes    Medication side effects:  None    PHQ-2 Total Score: 0    Additional concerns today:  No        Today's PHQ-2 Score:   PHQ-2 ( 1999 Pfizer) 10/4/2022   Q1: Little interest or pleasure in doing things 0   Q2: Feeling down, depressed or hopeless 0   PHQ-2 Score 0   PHQ-2 Total Score (12-17 Years)- Positive if 3 or more points; Administer PHQ-A if positive -   Q1: Little interest or pleasure in doing things Not at all   Q2: Feeling down, depressed or hopeless Not at all   PHQ-2 Score 0       Abuse: Current or Past (Physical, Sexual or Emotional) - No  Do you feel safe in your environment? Yes        Social History     Tobacco Use     Smoking status: Never     Smokeless tobacco: Never   Substance Use Topics     Alcohol use: Yes     Comment: 4 times a year     If you drink alcohol do you typically have >3 drinks per day or >7 drinks per week? No    Alcohol Use 10/4/2022   Prescreen: >3 drinks/day or >7 drinks/week? No   Prescreen: >3 drinks/day or >7 drinks/week? -   No flowsheet data found.    Reviewed orders with patient.  Reviewed health maintenance and updated orders accordingly - Yes  BP Readings from Last 3 Encounters:   10/11/22 128/74   04/29/22 116/74   09/24/21 120/80    Wt Readings from Last 3 Encounters:   10/11/22 88.9 kg (196 lb)   04/29/22 88 kg (194 lb)   09/24/21 84.3 kg (185 lb 12.8 oz)                  Patient Active Problem List   Diagnosis     Female stress incontinence      "Status post laparoscopic cholecystectomy     Osteoarthritis of both knees     Obesity (BMI 30-39.9)     BRIT (obstructive sleep apnea)     Hyperlipidemia LDL goal <130     Hypertension goal BP (blood pressure) < 140/90     Chest pain     Infiltrating ductal carcinoma of right female breast (H)     Antineoplastic antibiotics causing adverse effect in therapeutic use     Breast cancer (H)     Benign essential hypertension     Drugs and medicinal substances causing adverse effect in therapeutic use, initial encounter     Obesity (BMI 35.0-39.9) with comorbidity (H)     Past Surgical History:   Procedure Laterality Date     BIOPSY  2015    Breast cancer     COLONOSCOPY  05/2006    ormal to lt transverse colon, incomplete due to \"extremely redundant colon\"\"     COLONOSCOPY N/A 10/12/2020    Procedure: COLONOSCOPY;  Surgeon: Rebecca Tomlinson MD;  Location:  GI     D & C  09/2004     EXTRACT LENS CLEAR, EXCHANGE LENS REFRACTIVE, COMBINED  03/2013    bilateral     GRAFT FAT TO BREAST Bilateral 11/18/2016    Procedure: GRAFT FAT TO BREAST;  Surgeon: Alhaji Patton MD;  Location:  [facility]     GRAFT FAT TO BREAST  12/14/2018    Procedure: GRAFT FAT  FROM ABDOMEN TO BILATERAL  BREAST;  Surgeon: Alhaji Patton MD;  Location:  SD     GRAFT FREE VASCULARIZED TRANSVERSE RECTUS ABDOMINIS MYOCUTANEOUS Bilateral 04/12/2016    Procedure: GRAFT FREE VASCULARIZED TRANSVERSE RECTUS ABDOMINIS MYOCUTANEOUS;  Surgeon: Alhaji Patton MD;  Location:  OR      ABLATION, ENDOMETRIAL, THERMAL, W/O HYSTEROSCOPIC GUIDANCE  11/2004    NovaSure endometrial ablation.      HC COLON AIR CONTRAST  05/2006    normal     HC SLING OPERATION FOR STRESS INCONTINENCE  01/2007    [facility]arc sling procedure     INSERT PORT VASCULAR ACCESS Left 11/09/2015    Procedure: INSERT PORT VASCULAR ACCESS;  Surgeon: August Rdz MD;  Location:  SD     INSERT TISSUE EXPANDER BREAST BILATERAL Bilateral 09/29/2015    Procedure: " INSERT TISSUE EXPANDER BREAST BILATERAL;  Surgeon: Alhaji Patton MD;  Location:  OR     LAPAROSCOPIC CHOLECYSTECTOMY WITH CHOLANGIOGRAMS  01/2005    Laparoscopic cholecystectomy with intraoperative  cholangiogram     MASTECTOMY SIMPLE BILATERAL, SENTINEL NODE BILATERAL, COMBINED Bilateral 09/29/2015    Procedure: COMBINED MASTECTOMY SIMPLE BILATERAL, SENTINEL NODE BILATERAL;  Surgeon: August Rdz MD;  Location:  OR     ORTHOPEDIC SURGERY      left rotator cuff repair     RECONSTRUCT BREAST BILATERAL Bilateral 11/18/2016    Procedure: RECONSTRUCT BREAST BILATERAL;  Surgeon: Alhaji Patton MD;  Location:  SD     REMOVE PORT VASCULAR ACCESS N/A 11/18/2016    Procedure: REMOVE PORT VASCULAR ACCESS;  Surgeon: Alhaji Patton MD;  Location:  [facility]     REMOVE TISSUE EXPANDER BREAST Bilateral 04/12/2016    Procedure: REMOVE TISSUE EXPANDER BREAST;  Surgeon: Alhaji Patton MD;  Location:  OR     REVISE RECONSTRUCTED BREAST BILATERAL Bilateral 12/14/2018    Procedure: REVISION OF BILATERAL BREAST RECONSTRUCTION ( VASER );  Surgeon: Alhaji Patton MD;  Location: [facility]     REVISE SCAR TRUNK N/A 11/18/2016    Procedure: REVISE SCAR TRUNK;  Surgeon: Alhaji Patton MD;  Location: [facility]     TONSILLECTOMY  2001     VASCULAR SURGERY      insert port and removed       Social History     Tobacco Use     Smoking status: Never     Smokeless tobacco: Never   Substance Use Topics     Alcohol use: Yes     Comment: 4 times a year     Family History   Problem Relation Age of Onset     Asthma Daughter      C.A.D. Father 40        MI     Cerebrovascular Disease Father      Hypertension Mother      Breast Cancer Mother         Living     Thyroid Disease Mother         Diagnosed over 60     Sleep Apnea Mother      Breast Cancer Sister      Hypertension Sister      Hypertension Brother      Heart Disease Brother         pacemaker     Sleep Apnea Brother       Hyperlipidemia Brother      Hypertension Brother      Diabetes Brother         type 2     Sleep Apnea Brother      Hyperlipidemia Brother      Myocardial Infarction Maternal Grandmother      Diabetes Paternal Grandmother      Unknown/Adopted Maternal Grandfather      Unknown/Adopted Paternal Grandfather      Diabetes Maternal Aunt      Cancer - colorectal Maternal Uncle      Diabetes Maternal Aunt         with retinopathy leading to blindness     Diabetes Other      Hypertension Sister      Hyperlipidemia Sister      Breast Cancer Sister         Passed     Obesity Sister      Colon Cancer Other         Mothers brother     Other Cancer Other      Other Cancer Niece         passed     Asthma Daughter         only when sick     Thyroid Disease Nephew         Diagnosed at age 9         Current Outpatient Medications   Medication Sig Dispense Refill     aspirin 81 MG EC tablet Take 81 mg by mouth daily       Cyanocobalamin (VITAMIN B12 PO) Take by mouth daily       losartan (COZAAR) 100 MG tablet Take 1 tablet (100 mg) by mouth daily 90 tablet 1     VITAMIN D, CHOLECALCIFEROL, PO Take 3,000 Units by mouth daily       carvedilol (COREG) 25 MG tablet TAKE ONE TABLET (25 MG) BY MOUTH TWICE A DAY WITH MEALS (Patient not taking: No sig reported) 180 tablet 0     gabapentin (NEURONTIN) 100 MG capsule Take 1 capsule (100 mg) by mouth 2 times daily (Patient not taking: Reported on 9/24/2021) 20 capsule 0     valACYclovir (VALTREX) 1000 mg tablet Take 1 tablet (1,000 mg) by mouth 2 times daily for 10 days 20 tablet 0     Allergies   Allergen Reactions     Definity Other (See Comments)     A substance that helps with imaging the heart muscle with echocardiology    Full body ache starting in the low back.      Keflex [Cephalexin]      Hand swelling, blisters???     Recent Labs   Lab Test 04/29/22  0912 09/24/21  0907 08/24/20  0950 06/17/19  1051 06/07/19  0000 01/28/19  1543 11/30/18  1036 09/21/15  1531 04/03/15  0717  14  1656   * 123* 127* 104*  --   --   --    < > 84  --    HDL  --  56 66 67  --   --   --    < > 62  --    TRIG  --  128 102 146  --   --   --    < > 119  --    ALT  --  30 23  --  25   < >  --    < >  --   --    CR 0.74 0.76 0.61  --  0.65  --  0.76   < > 0.64  --    GFRESTIMATED >90 86 >90  --  98.1  --  78   < > >90  Non  GFR Calc    --    GFRESTBLACK  --   --  >90  --   --   --  >90   < > >90  African American GFR Calc    --    POTASSIUM 4.5 4.3 4.0  --  4.2  --  4.0   < > 3.6  --    TSH  --   --   --   --   --   --   --   --  2.34 2.42    < > = values in this interval not displayed.        Breast Cancer Screening:    FHS-7: No flowsheet data found.  click delete button to remove this line now  Mammogram Screening: Recommended mammography every 1-2 years with patient discussion and risk factor consideration  Pertinent mammograms are reviewed under the imaging tab.    History of abnormal Pap smear: NO - age 30-65 PAP every 5 years with negative HPV co-testing recommended     Reviewed and updated as needed this visit by clinical staff                  Reviewed and updated as needed this visit by Provider                 Past Medical History:   Diagnosis Date     Benign hypertension     started medication 3/13     Cancer (H)     breast cancer, right     Chest pain     side effect of Femera     Female stress incontinence 2006    s/p sling procedure     Mixed hyperlipidemia      Motion sickness      Obesity (BMI 30-39.9)      BRIT (obstructive sleep apnea)     not using CPAP, treated with tonsilliectomy     Osteoarthritis of both knees      PONV (postoperative nausea and vomiting)      Status post laparoscopic cholecystectomy 2005    Cholelithiasis with mild subacute hemorrhagic cholecystitis     Supervision of other normal pregnancy      - vaginal     Uncomplicated asthma     with respiratory infections      Past Surgical History:   Procedure Laterality Date  "    BIOPSY  2015    Breast cancer     COLONOSCOPY  05/2006    ormal to lt transverse colon, incomplete due to \"extremely redundant colon\"\"     COLONOSCOPY N/A 10/12/2020    Procedure: COLONOSCOPY;  Surgeon: Rebecca Tomlinson MD;  Location:  GI     D & C  09/2004     EXTRACT LENS CLEAR, EXCHANGE LENS REFRACTIVE, COMBINED  03/2013    bilateral     GRAFT FAT TO BREAST Bilateral 11/18/2016    Procedure: GRAFT FAT TO BREAST;  Surgeon: Alhaji Patton MD;  [facility]:  [facility]     GRAFT FAT TO BREAST  12/14/2018    Procedure: GRAFT FAT  FROM ABDOMEN TO BILATERAL  BREAST;  Surgeon: Alhaji Patton MD;  Location:  [facility]     GRAFT FREE VASCULARIZED TRANSVERSE RECTUS ABDOMINIS MYOCUTANEOUS Bilateral 04/12/2016    Procedure: GRAFT FREE VASCULARIZED TRANSVERSE RECTUS ABDOMINIS MYOCUTANEOUS;  Surgeon: Alhaji Patton MD;  Location:  OR      ABLATION, ENDOMETRIAL, THERMAL, W/O HYSTEROSCOPIC GUIDANCE  11/2004    NovaSure endometrial ablation.      HC COLON AIR CONTRAST  05/2006    normal     HC SLING OPERATION FOR STRESS INCONTINENCE  01/2007    [facility]arc sling procedure     INSERT PORT VASCULAR ACCESS Left 11/09/2015    Procedure: INSERT PORT VASCULAR ACCESS;  Surgeon: August Rdz MD;  Location: [facility]     INSERT TISSUE EXPANDER BREAST BILATERAL Bilateral 09/29/2015    Procedure: INSERT TISSUE EXPANDER BREAST BILATERAL;  Surgeon: Alhaji Patton MD;  Location:  OR     LAPAROSCOPIC CHOLECYSTECTOMY WITH CHOLANGIOGRAMS  01/2005    Laparoscopic cholecystectomy with intraoperative  cholangiogram     MASTECTOMY SIMPLE BILATERAL, SENTINEL NODE BILATERAL, COMBINED Bilateral 09/29/2015    Procedure: COMBINED MASTECTOMY SIMPLE BILATERAL, SENTINEL NODE BILATERAL;  Surgeon: August Rdz MD;  Location:  OR     ORTHOPEDIC SURGERY      left rotator cuff repair     RECONSTRUCT BREAST BILATERAL Bilateral 11/18/2016    Procedure: RECONSTRUCT BREAST BILATERAL;  Surgeon: Alhaji Patton" "MD Kalia;  Location: [facility]     REMOVE PORT VASCULAR ACCESS N/A 11/18/2016    Procedure: REMOVE PORT VASCULAR ACCESS;  Surgeon: Alhaji Patton MD;  Location: [facility]     REMOVE TISSUE EXPANDER BREAST Bilateral 04/12/2016    Procedure: REMOVE TISSUE EXPANDER BREAST;  Surgeon: Alhaji Patton MD;  Location:  OR     REVISE RECONSTRUCTED BREAST BILATERAL Bilateral 12/14/2018    Procedure: REVISION OF BILATERAL BREAST RECONSTRUCTION ( VASER );  Surgeon: Alhaji Patton MD;  Location: [facility]     REVISE SCAR TRUNK N/A 11/18/2016    Procedure: REVISE SCAR TRUNK;  Surgeon: Alhaji Patton MD;  Location: [facility]     TONSILLECTOMY  2001     VASCULAR SURGERY      insert port and removed       Review of Systems   Constitutional: Negative for chills and fever.   HENT: Negative for congestion, ear pain, hearing loss and sore throat.    Eyes: Negative for pain and visual disturbance.   Respiratory: Negative for cough and shortness of breath.    Cardiovascular: Negative for chest pain, palpitations and peripheral edema.   Gastrointestinal: Negative for abdominal pain, constipation, diarrhea, heartburn, hematochezia and nausea.   Breasts:  Negative for tenderness, breast mass and discharge.   Genitourinary: Negative for dysuria, frequency, genital sores, hematuria, pelvic pain, urgency, vaginal bleeding and vaginal discharge.   Musculoskeletal: Negative for arthralgias, joint swelling and myalgias.   Skin: Negative for rash.   Neurological: Negative for dizziness, weakness, headaches and paresthesias.   Psychiatric/Behavioral: Negative for mood changes. The patient is not nervous/anxious.           OBJECTIVE:   /74   Pulse 61   Temp 98.1  F (36.7  C) (Temporal)   Resp 16   Ht 1.575 m (5' 2\")   Wt 88.9 kg (196 lb)   LMP 02/23/2013   SpO2 99%   BMI 35.85 kg/m    Physical Exam  GENERAL: healthy, alert and no distress  EYES: Eyes grossly normal to inspection, PERRL and conjunctivae " "and sclerae normal  HENT: ear canals and TM's normal, nose and mouth without ulcers or lesions  NECK: no adenopathy, no asymmetry, masses, or scars and thyroid normal to palpation  RESP: lungs clear to auscultation - no rales, rhonchi or wheezes  CV: regular rate and rhythm, normal S1 S2, no S3 or S4, no murmur, click or rub, no peripheral edema and peripheral pulses strong  ABDOMEN: soft, nontender, no hepatosplenomegaly, no masses and bowel sounds normal  MS: no gross musculoskeletal defects noted, no edema  SKIN: no suspicious lesions or rashes  NEURO: Normal strength and tone, mentation intact and speech normal        ASSESSMENT/PLAN:       ICD-10-CM    1. Healthcare maintenance  Z00.00 CBC with platelets     Comprehensive metabolic panel (BMP + Alb, Alk Phos, ALT, AST, Total. Bili, TP)     Lipid panel reflex to direct LDL Fasting          Patient has been advised of split billing requirements and indicates understanding: Yes    COUNSELING:  Reviewed preventive health counseling, as reflected in patient instructions    Estimated body mass index is 35.85 kg/m  as calculated from the following:    Height as of this encounter: 1.575 m (5' 2\").    Weight as of this encounter: 88.9 kg (196 lb).    Weight management plan: Discussed healthy diet and exercise guidelines    She reports that she has never smoked. She has never used smokeless tobacco.      Counseling Resources:  ATP IV Guidelines  Pooled Cohorts Equation Calculator  Breast Cancer Risk Calculator  BRCA-Related Cancer Risk Assessment: FHS-7 Tool  FRAX Risk Assessment  ICSI Preventive Guidelines  Dietary Guidelines for Americans, 2010  USDA's MyPlate  ASA Prophylaxis  Lung CA Screening    Dilan Soto MD  [facility]  "

## 2022-10-12 LAB
ALBUMIN SERPL-MCNC: 4.1 G/DL (ref 3.4–5)
ALP SERPL-CCNC: 80 U/L (ref 40–150)
ALT SERPL W P-5'-P-CCNC: 26 U/L (ref 0–50)
ANION GAP SERPL CALCULATED.3IONS-SCNC: 3 MMOL/L (ref 3–14)
AST SERPL W P-5'-P-CCNC: 14 U/L (ref 0–45)
BILIRUB SERPL-MCNC: 0.8 MG/DL (ref 0.2–1.3)
BUN SERPL-MCNC: 13 MG/DL (ref 7–30)
CALCIUM SERPL-MCNC: 9.3 MG/DL (ref 8.5–10.1)
CHLORIDE BLD-SCNC: 106 MMOL/L (ref 94–109)
CHOLEST SERPL-MCNC: 188 MG/DL
CO2 SERPL-SCNC: 29 MMOL/L (ref 20–32)
CREAT SERPL-MCNC: 0.6 MG/DL (ref 0.52–1.04)
FASTING STATUS PATIENT QL REPORTED: YES
GFR SERPL CREATININE-BSD FRML MDRD: >90 ML/MIN/1.73M2
GLUCOSE BLD-MCNC: 101 MG/DL (ref 70–99)
HDLC SERPL-MCNC: 60 MG/DL
LDLC SERPL CALC-MCNC: 102 MG/DL
NONHDLC SERPL-MCNC: 128 MG/DL
POTASSIUM BLD-SCNC: 4.3 MMOL/L (ref 3.4–5.3)
PROT SERPL-MCNC: 7.6 G/DL (ref 6.8–8.8)
SODIUM SERPL-SCNC: 138 MMOL/L (ref 133–144)
TRIGL SERPL-MCNC: 130 MG/DL

## 2022-10-13 NOTE — TELEPHONE ENCOUNTER
Her BMI is over 35, she could be a good candidate if her insurance covers it. I can send prescription pharmacy. Please check on with her, order is pended.  If she starts it, she should f/u with me in 1 month for dose adjustment       thx

## 2022-10-14 NOTE — TELEPHONE ENCOUNTER
Patient Contact     S/w pt and relayed message from Dr. Soto, see below. She states understanding and would like to try the medication. Pharmacy pended.     Kierra COLLADO RN  [facility] [place]

## 2022-10-19 ENCOUNTER — TELEPHONE (OUTPATIENT)
Dept: FAMILY MEDICINE | Facility: CLINIC | Age: 62
End: 2022-10-19

## 2022-10-19 NOTE — TELEPHONE ENCOUNTER
Prior Authorization Retail Medication Request    Medication/Dose: Ozempic 0.25 OR 0.5/DOS 1X2MG Pen    Rationale:  Step therapy has not been met per her insurance. Please call plan at [phone] to initiate prior authorization or call/fax pharmacy to request change medication. Patient ID # is [id number]    Pharmacy Information (if different than what is on RX)  Name:  [facility]  Phone:  [phone]

## 2022-10-20 NOTE — TELEPHONE ENCOUNTER
Central Prior Authorization Team   Phone: [phone]    PA Initiation    Medication: Ozempic 0.25 OR 0.5/DOS 1X2MG Pen  Insurance Company: [employer] [place] - Phone [phone] Fax [phone]  Pharmacy Filling the Rx: [id number] [place] [address] & [facility]  Filling Pharmacy Phone: [phone]  Filling Pharmacy Fax:    Start Date: 10/20/2022

## 2022-10-25 NOTE — TELEPHONE ENCOUNTER
PRIOR AUTHORIZATION DENIED    Medication: Ozempic 0.25 OR 0.5/DOS 1X2MG Pen    Denial Date: 10/25/2022    Denial Rational:                  Appeal Information:    If you would like to appeal, please supply P/A team with a letter of medical necessity with clinical reason.

## 2022-10-25 NOTE — TELEPHONE ENCOUNTER
"You'll have to try one of these medicines before you can \"step up\" to a newer or more expensive medicine.   ---> what option did they give her? Please check on with her      thx    "

## 2022-10-28 ENCOUNTER — TELEPHONE (OUTPATIENT)
Dept: FAMILY MEDICINE | Facility: CLINIC | Age: 62
End: 2022-10-28

## 2022-10-28 DIAGNOSIS — R73.03 PRE-DIABETES: ICD-10-CM

## 2022-10-28 DIAGNOSIS — I10 BENIGN ESSENTIAL HYPERTENSION: ICD-10-CM

## 2022-10-28 RX ORDER — METFORMIN HCL 500 MG
500 TABLET, EXTENDED RELEASE 24 HR ORAL
Qty: 30 TABLET | Refills: 0 | Status: SHIPPED | OUTPATIENT
Start: 2022-10-28 | End: 2022-11-30

## 2022-10-28 NOTE — TELEPHONE ENCOUNTER
Medication Question or Refill        What medication are you calling about (include dose and sig)?: Saxenda 18MG/3ML pen injectors    Controlled Substance Agreement on file:   CSA -- Patient Level:    CSA: None found at the patient level.       Who prescribed the medication?: ?    Do you need a refill? Yes: RX requires a PA KEY [id number]    When did you use the medication last?     Patient offered an appointment? No    Do you have any questions or concerns?  No    Preferred Pharmacy:   [id number] [place] [address] & [address] [address]  Phone: [phone] Fax: [phone]      Could we send this information to you in [facility] or would you prefer to receive a phone call?:   Patient would prefer a phone call   Okay to leave a detailed message?: Yes at Home number on file [phone] (home)    Kirsten Barron/Mirian-  [facility]

## 2022-10-31 RX ORDER — METFORMIN HCL 500 MG
TABLET, EXTENDED RELEASE 24 HR ORAL
Qty: 90 TABLET | OUTPATIENT
Start: 2022-10-31

## 2022-10-31 NOTE — TELEPHONE ENCOUNTER
Central Prior Authorization Team - Phone: [phone]     PA Initiation    Medication: PA needed for Saxenda- PA INITIATED  Insurance Company:    Pharmacy Filling the Rx: [id number] [place] [address] & [facility]  Filling Pharmacy Phone: [phone]  Filling Pharmacy Fax:    Start Date: 10/31/2022

## 2022-10-31 NOTE — TELEPHONE ENCOUNTER
Please see [employer] message and advise.     Patient picked up her medication, but was unable to get the Saxenda. The medication is not within their network of drugs so it will not be covered by insurance.    Ilana Masters RN  [facility] Triage Team

## 2022-10-31 NOTE — TELEPHONE ENCOUNTER
Denied request, duplicate. Rx just sent to the pharmacy 10/28/22.   Kierra COLLADO RN  [facility]

## 2022-10-31 NOTE — TELEPHONE ENCOUNTER
Patient notified of provider's response via [employer].    Ilana Masters RN  [facility] Triage Team

## 2022-10-31 NOTE — TELEPHONE ENCOUNTER
She can start Metformin without Saxenda. Even if it is not as dramatic as GLP-1 category, it might help wt loss somehow. Please check on with us in 1 months for us to consider adjust the dose    thx

## 2022-11-04 NOTE — TELEPHONE ENCOUNTER
Central Prior Authorization Team - Phone: [phone]     Prior Authorization Follow Up    Called [employer] [place]  to check status of saxenda    Request is pending review.     PA DEPT  will follow up again on status in 1 to 2 business days.

## 2022-11-08 NOTE — TELEPHONE ENCOUNTER
Central Prior Authorization Team - Phone: [phone]     PRIOR AUTHORIZATION DENIED    Medication: PA needed for Saxenda- PA DENIED    Denial Date: 11/7/2022    Denial Rational: drug not covered under plan benefits, plan exclusion.        Appeal Information: If the provider would like to appeal, please provide a letter of medical necessity and route back to the team. Otherwise you can close the encounter. Thank you, Central PA Team

## 2022-11-09 NOTE — TELEPHONE ENCOUNTER
All the PA were declined by her insurance. There is NO medicine in the GLP-1 hopeful for coverage. She may consider paying out of pocket if she really wants to try it.

## 2022-11-09 NOTE — TELEPHONE ENCOUNTER
Spoke to patient and she understands. She will stick with the Metformin.     Dolly George RN  [facility]

## 2022-11-27 DIAGNOSIS — R73.03 PRE-DIABETES: ICD-10-CM

## 2022-11-27 DIAGNOSIS — I10 BENIGN ESSENTIAL HYPERTENSION: ICD-10-CM

## 2022-11-30 DIAGNOSIS — I10 BENIGN ESSENTIAL HYPERTENSION: ICD-10-CM

## 2022-11-30 DIAGNOSIS — R73.03 PRE-DIABETES: ICD-10-CM

## 2022-11-30 RX ORDER — METFORMIN HCL 500 MG
TABLET, EXTENDED RELEASE 24 HR ORAL
Qty: 30 TABLET | Refills: 0 | Status: SHIPPED | OUTPATIENT
Start: 2022-11-30 | End: 2022-12-02

## 2022-11-30 NOTE — TELEPHONE ENCOUNTER
Patient is having body aches, loss of taste and smell and fatigue starting on Monday.  Has a mild cough and nasal congestion.   No fevers, nausea,vomiting or diarrhea.    Rapid COVID swab collected.     Patient would like communication of their results via:        Cell Phone:   Telephone Information:   Mobile [phone]     Okay to leave a message containing results? Yes     Routing refill request to provider for review/approval because:  No A1C on file. Please review.     Appointments in Next Year      Dec 02, 2022  8:00 AM  (Arrive by 7:40 AM)  Provider Visit with Dilan Soto MD  [facility] ([facility] ) [phone]           Zuhair Chowdhury RN

## 2022-12-02 ENCOUNTER — MYC MEDICAL ADVICE (OUTPATIENT)
Dept: FAMILY MEDICINE | Facility: CLINIC | Age: 62
End: 2022-12-02

## 2022-12-02 ENCOUNTER — VIRTUAL VISIT (OUTPATIENT)
Dept: FAMILY MEDICINE | Facility: CLINIC | Age: 62
End: 2022-12-02
Payer: COMMERCIAL

## 2022-12-02 DIAGNOSIS — R73.03 PRE-DIABETES: ICD-10-CM

## 2022-12-02 DIAGNOSIS — J20.9 ACUTE BRONCHITIS WITH SYMPTOMS > 10 DAYS: Primary | ICD-10-CM

## 2022-12-02 DIAGNOSIS — I10 BENIGN ESSENTIAL HYPERTENSION: ICD-10-CM

## 2022-12-02 DIAGNOSIS — R10.12 LUQ ABDOMINAL PAIN: ICD-10-CM

## 2022-12-02 DIAGNOSIS — R22.1 NECK MASS: ICD-10-CM

## 2022-12-02 PROCEDURE — 99214 OFFICE O/P EST MOD 30 MIN: CPT | Mod: 95 | Performed by: FAMILY MEDICINE

## 2022-12-02 RX ORDER — CODEINE PHOSPHATE AND GUAIFENESIN 10; 100 MG/5ML; MG/5ML
1-2 SOLUTION ORAL EVERY 6 HOURS PRN
Qty: 250 ML | Refills: 0 | Status: SHIPPED | OUTPATIENT
Start: 2022-12-02 | End: 2022-12-02

## 2022-12-02 RX ORDER — METFORMIN HCL 500 MG
TABLET, EXTENDED RELEASE 24 HR ORAL
Qty: 90 TABLET | Refills: 1 | Status: SHIPPED | OUTPATIENT
Start: 2022-12-02 | End: 2023-05-24

## 2022-12-02 RX ORDER — METFORMIN HCL 500 MG
TABLET, EXTENDED RELEASE 24 HR ORAL
Qty: 90 TABLET | OUTPATIENT
Start: 2022-12-02

## 2022-12-02 RX ORDER — ALBUTEROL SULFATE 90 UG/1
2 AEROSOL, METERED RESPIRATORY (INHALATION) EVERY 6 HOURS PRN
Qty: 18 G | Refills: 0 | Status: SHIPPED | OUTPATIENT
Start: 2022-12-02 | End: 2022-12-28

## 2022-12-02 RX ORDER — DOXYCYCLINE HYCLATE 100 MG
100 TABLET ORAL 2 TIMES DAILY
Qty: 20 TABLET | Refills: 0 | Status: SHIPPED | OUTPATIENT
Start: 2022-12-02 | End: 2023-04-10

## 2022-12-02 RX ORDER — BENZONATATE 200 MG/1
200 CAPSULE ORAL 3 TIMES DAILY PRN
Qty: 30 CAPSULE | Refills: 0 | Status: SHIPPED | OUTPATIENT
Start: 2022-12-02 | End: 2023-04-10

## 2022-12-02 NOTE — TELEPHONE ENCOUNTER
This refill is a duplicate of something already sent to the pharmacy or another refill already in process.  Zuhair Chowdhury RN  [facility]

## 2022-12-02 NOTE — TELEPHONE ENCOUNTER
Patient called in and states that the pharmacy is not able to fill the script for Quaifenesin-codine and informed her that pharmacies no longer stock this medication due to theft.     She is hoping Dr. Soto can send in an alternative medication to the same pharmacy for the cough medication

## 2022-12-05 ENCOUNTER — TELEPHONE (OUTPATIENT)
Dept: FAMILY MEDICINE | Facility: CLINIC | Age: 62
End: 2022-12-05

## 2022-12-05 NOTE — TELEPHONE ENCOUNTER
Prior Authorization Retail Medication Request    Medication/Dose: benzonatate (TESSALON) 200 MG capsule  ICD code (if different than what is on RX):  Previously Tried and Failed:  Rationale:    Insurance Name: UNKNOWN  Insurance ID: UNKNOWN    Pharmacy Information (if different than what is on RX)  Name:[facility]  Phone:[phone]    Please include previous medications tried and failed.  Please ask insurance for medications on formulary.

## 2022-12-06 RX ORDER — BENZONATATE 100 MG/1
100 CAPSULE ORAL 3 TIMES DAILY PRN
Qty: 21 CAPSULE | Refills: 0 | Status: SHIPPED | OUTPATIENT
Start: 2022-12-06 | End: 2023-04-10

## 2022-12-06 NOTE — TELEPHONE ENCOUNTER
Patient notified of provider's response via [employer].    Ilana Masters RN  [facility] Triage Team

## 2022-12-06 NOTE — TELEPHONE ENCOUNTER
Central Prior Authorization Team   Phone: [phone]    PA Initiation    Medication: benzonatate (TESSALON) 200 MG capsule  Insurance Company: [facility] - Phone [phone] Fax [phone]  Pharmacy Filling the Rx: [id number] [place] [id number] [place] [address] & [facility]  Filling Pharmacy Phone: [phone]  Filling Pharmacy Fax:    Start Date: 12/6/2022

## 2022-12-08 ENCOUNTER — HOSPITAL ENCOUNTER (OUTPATIENT)
Dept: ULTRASOUND IMAGING | Facility: CLINIC | Age: 62
Discharge: HOME | End: 2022-12-08
Attending: FAMILY MEDICINE
Payer: COMMERCIAL

## 2022-12-08 DIAGNOSIS — R22.1 NECK MASS: ICD-10-CM

## 2022-12-08 DIAGNOSIS — R10.12 LUQ ABDOMINAL PAIN: ICD-10-CM

## 2022-12-08 PROCEDURE — 76700 US EXAM ABDOM COMPLETE: CPT

## 2022-12-08 PROCEDURE — 76536 US EXAM OF HEAD AND NECK: CPT

## 2022-12-08 NOTE — TELEPHONE ENCOUNTER
Prior Authorization Approval    Authorization Effective Date: 12/8/2022  Authorization Expiration Date: 12/8/2023  Medication: benzonatate (TESSALON) 200 MG capsule  Approved Dose/Quantity:    Reference #:     Insurance Company: [employer] [place] - Phone [phone] Fax [phone]  Expected CoPay:       CoPay Card Available:      Foundation Assistance Needed:    Which Pharmacy is filling the prescription (Not needed for infusion/clinic administered): [id number] [place] [address] & [facility]  Pharmacy Notified: Yes  Patient Notified: Yes  **Instructed pharmacy to notify patient when script is ready to /ship.**

## 2022-12-12 ENCOUNTER — MYC MEDICAL ADVICE (OUTPATIENT)
Dept: FAMILY MEDICINE | Facility: CLINIC | Age: 62
End: 2022-12-12

## 2022-12-12 DIAGNOSIS — J98.01 BRONCHIAL SPASMS: Primary | ICD-10-CM

## 2022-12-14 NOTE — TELEPHONE ENCOUNTER
Patient called to get scheduled for a follow up due to not feeling any better from 12/2 visit     Only opening with Dr. Soto and is Virtual same day on 12/15 at 1:30 okay to use this spot?    Patient prefers to see Dr. Soto specifically    Josiah Barger RN

## 2022-12-14 NOTE — TELEPHONE ENCOUNTER
If she feels it is improving, she might not need to do follow up assessment. She may need only conservative management for now in the case

## 2022-12-15 RX ORDER — PREDNISONE 10 MG/1
10 TABLET ORAL 2 TIMES DAILY
Qty: 10 TABLET | Refills: 0 | Status: SHIPPED | OUTPATIENT
Start: 2022-12-15 | End: 2023-04-10

## 2022-12-15 NOTE — TELEPHONE ENCOUNTER
See below, pt agreeable to recommendation. Asks that prednisone 5 day course be sent to pharmacy as pended    Mallory ALMANZAR, Triage RN  [facility]

## 2022-12-15 NOTE — TELEPHONE ENCOUNTER
I don't think she needs another antibiotic treatment for now. But, to improve her symptom, I would have her to try prednisone for next 5 days if she is ok with it. And, we should  have her to recheck on Tuesday.   Please check on with her and let me know,then I will release the prednisone        thx

## 2022-12-15 NOTE — TELEPHONE ENCOUNTER
TO PCP:     See most recent message below from patient, please advise     Mallory ALMANZAR, Triage RN  [facility]

## 2022-12-24 DIAGNOSIS — J20.9 ACUTE BRONCHITIS WITH SYMPTOMS > 10 DAYS: ICD-10-CM

## 2022-12-28 RX ORDER — ALBUTEROL SULFATE 90 UG/1
AEROSOL, METERED RESPIRATORY (INHALATION)
Qty: 18 G | Refills: 0 | Status: SHIPPED | OUTPATIENT
Start: 2022-12-28 | End: 2023-04-10

## 2023-01-26 ENCOUNTER — E-VISIT (OUTPATIENT)
Dept: URGENT CARE | Facility: URGENT CARE | Age: 63
End: 2023-01-26
Payer: COMMERCIAL

## 2023-01-26 DIAGNOSIS — R30.0 DYSURIA: Primary | ICD-10-CM

## 2023-01-26 PROCEDURE — 99421 OL DIG E/M SVC 5-10 MIN: CPT

## 2023-01-26 NOTE — PATIENT INSTRUCTIONS
Dear Theresa M Delosreyes,     After reviewing your responses, I would like you to come in for a urine test to make sure we treat you correctly. This urine test is to evaluate you for a possible urinary tract infection, and should be scheduled for today or tomorrow. Schedule a Lab Only appointment here.     Lab appointments are not available at most locations on the weekends. If no Lab Only appointment is available, you should be seen in any of our convenient Walk-in or Urgent Care Centers, which can be found on our website here.  Please schedule an appointment with the lab right here in [place], or call [phone].     You will receive instructions with your results in [place] once they are available.     If your symptoms worsen, you develop pain in your back or stomach, develop fevers, or are not improving in 5 days, please contact your primary care provider for an appointment or visit a Walk-in or Urgent Care Center to be seen.     Thanks again for choosing us as your health care partner,     TERE Putnam CNP

## 2023-01-27 ENCOUNTER — LAB (OUTPATIENT)
Dept: LAB | Facility: CLINIC | Age: 63
End: 2023-01-27
Payer: COMMERCIAL

## 2023-01-27 DIAGNOSIS — R30.0 DYSURIA: ICD-10-CM

## 2023-01-27 LAB
ALBUMIN UR-MCNC: NEGATIVE MG/DL
APPEARANCE UR: CLEAR
BILIRUB UR QL STRIP: NEGATIVE
CLUE CELLS: ABNORMAL
COLOR UR AUTO: YELLOW
GLUCOSE UR STRIP-MCNC: NEGATIVE MG/DL
HGB UR QL STRIP: NEGATIVE
KETONES UR STRIP-MCNC: NEGATIVE MG/DL
LEUKOCYTE ESTERASE UR QL STRIP: NEGATIVE
NITRATE UR QL: NEGATIVE
PH UR STRIP: 6 [PH] (ref 5–7)
SP GR UR STRIP: 1.01 (ref 1–1.03)
TRICHOMONAS, WET PREP: ABNORMAL
UROBILINOGEN UR STRIP-ACNC: 0.2 E.U./DL
WBC'S/HIGH POWER FIELD, WET PREP: ABNORMAL
YEAST, WET PREP: ABNORMAL

## 2023-01-27 PROCEDURE — 87210 SMEAR WET MOUNT SALINE/INK: CPT

## 2023-01-27 PROCEDURE — 81003 URINALYSIS AUTO W/O SCOPE: CPT

## 2023-01-30 ENCOUNTER — TELEPHONE (OUTPATIENT)
Dept: FAMILY MEDICINE | Facility: CLINIC | Age: 63
End: 2023-01-30
Payer: COMMERCIAL

## 2023-01-30 NOTE — TELEPHONE ENCOUNTER
I would wait her carotid US results, and will follow from there  After carotid US and discussion with Dr Licea about the results, she can schedule VV with me for further discussion and assessment, that might give me a chance to consider brain imaging and echocardiogram if indicated

## 2023-01-30 NOTE — TELEPHONE ENCOUNTER
If she has sx of Amaurosis Fugax, it could be a sign of TIA. She has risk factor of elevated glucose/cholesterol/obesity. I agree for her to do further evaluation(brain MRI and echocardiogram)      thx

## 2023-01-30 NOTE — TELEPHONE ENCOUNTER
Dr. Licea called back from [facility] stating she can only order Carotid Ultrasound/Doppler and based on her specialty does not order or manage MRA, MRI, or echocardiogram. She is asking Dr. Soto to do this so he can follow up with her for treatment if needed. Would patient need a visit with Dr. Soto? What would be the next step?     Dr. Mccoy is asking if Dr. Soto will order an MRA, should she cancel the carotid ultrasound?     Please call back and leave a detailed VM for Dr. Mccoy at [phone]

## 2023-01-30 NOTE — TELEPHONE ENCOUNTER
Pt requesting recommendation/advise on lab results from 1/27/23, she did e-visit on 1/26/23. Routing to provider to review/advise. Please route to appropriate pool with response.    Kierra COLLADO RN  [facility]

## 2023-01-30 NOTE — TELEPHONE ENCOUNTER
Patient Contact     Attempt # 1     Was call answered?    No  Left detailed message with response from Dr. Soto and to call the clinic back at [phone].     On call back:      -See message below from Dr. Soto. He did not order anything. Is Dr. Licea going to order these tests? If not pt may need appointment with PCP as well?    Kierra COLLADO RN  [facility]

## 2023-01-30 NOTE — TELEPHONE ENCOUNTER
Patient Contact     S/w Dr. Licea and relayed message from Dr. Soto, see below, and she was in agreement with the plan. Also reached out to pt with message from provider and relayed cardiology scheduling number as well, she stated understanding as well.    Kierra COLLADO RN  [facility]

## 2023-01-30 NOTE — TELEPHONE ENCOUNTER
Dr. Sharlene Licea with [facility] calling regarding patient.     Patient came into clinic today with possible amaurosis episode that patient reported occurred about 5 days ago and has not reoccurred since. Patient experienced 5 mins of vision loss in L eye that then improved.     Dr. Licea reports patient needs work-up. Pt became anxious and expressed concerns of testing costs.    Dr. Licea has ordered carotid dopplers. She is not sure if patient will need further work-up as she does not know patient as well as PCP. Dr. Licea is aware patient has recently started on metformin, and has hypertension but is controlled. Patient does not have neurologic symptoms. Dr. Licea does not feel patient needs ER trip.     - Asking if patient should have MRI/MRA (Ideally) and/or Echo Cardiogram due to patient risk factors.  - If patient is low risk, asking if dopplers should be okay?        Dr. Sharlene Licea (Goes by Wendy)  Okay to leave detailed VM  Cell: [phone]

## 2023-01-31 NOTE — TELEPHONE ENCOUNTER
Joi Francisco, APRN CNP  You 41 minutes ago (10:08 AM)     EM  No signs of a UTI or bacterial vaginosis, if patient is still having symptoms recommend she have an in person visit.   Thanks       Please do not route back to individual, please route to appropriate pool.     Kierra COLLADO RN  [facility]

## 2023-02-01 ENCOUNTER — HOSPITAL ENCOUNTER (OUTPATIENT)
Dept: ULTRASOUND IMAGING | Facility: CLINIC | Age: 63
Discharge: HOME | End: 2023-02-01
Attending: OPHTHALMOLOGY | Admitting: OPHTHALMOLOGY
Payer: COMMERCIAL

## 2023-02-01 DIAGNOSIS — G45.3 AF (AMAUROSIS FUGAX): ICD-10-CM

## 2023-02-01 PROCEDURE — 93880 EXTRACRANIAL BILAT STUDY: CPT

## 2023-02-20 ENCOUNTER — TRANSFERRED RECORDS (OUTPATIENT)
Dept: HEALTH INFORMATION MANAGEMENT | Facility: CLINIC | Age: 63
End: 2023-02-20
Payer: COMMERCIAL

## 2023-03-01 DIAGNOSIS — I10 BENIGN ESSENTIAL HYPERTENSION: ICD-10-CM

## 2023-03-01 RX ORDER — LOSARTAN POTASSIUM 100 MG/1
TABLET ORAL
Qty: 90 TABLET | Refills: 1 | Status: SHIPPED | OUTPATIENT
Start: 2023-03-01 | End: 2023-09-08

## 2023-04-10 ENCOUNTER — OFFICE VISIT (OUTPATIENT)
Dept: FAMILY MEDICINE | Facility: CLINIC | Age: 63
End: 2023-04-10
Payer: COMMERCIAL

## 2023-04-10 VITALS
WEIGHT: 176.25 LBS | TEMPERATURE: 98.5 F | DIASTOLIC BLOOD PRESSURE: 70 MMHG | OXYGEN SATURATION: 100 % | HEART RATE: 67 BPM | HEIGHT: 62 IN | BODY MASS INDEX: 32.44 KG/M2 | SYSTOLIC BLOOD PRESSURE: 134 MMHG | RESPIRATION RATE: 16 BRPM

## 2023-04-10 DIAGNOSIS — I10 BENIGN ESSENTIAL HYPERTENSION: Primary | ICD-10-CM

## 2023-04-10 DIAGNOSIS — E66.9 OBESITY (BMI 30-39.9): ICD-10-CM

## 2023-04-10 DIAGNOSIS — E78.5 HYPERLIPIDEMIA LDL GOAL <130: ICD-10-CM

## 2023-04-10 PROBLEM — E66.01 MORBID OBESITY (H): Status: RESOLVED | Noted: 2018-10-04 | Resolved: 2023-04-10

## 2023-04-10 LAB — TSH SERPL DL<=0.005 MIU/L-ACNC: 1.82 UIU/ML (ref 0.3–4.2)

## 2023-04-10 PROCEDURE — 99214 OFFICE O/P EST MOD 30 MIN: CPT | Performed by: FAMILY MEDICINE

## 2023-04-10 PROCEDURE — 84443 ASSAY THYROID STIM HORMONE: CPT | Performed by: FAMILY MEDICINE

## 2023-04-10 PROCEDURE — 36415 COLL VENOUS BLD VENIPUNCTURE: CPT | Performed by: FAMILY MEDICINE

## 2023-04-10 RX ORDER — CARVEDILOL 25 MG/1
TABLET ORAL
Qty: 90 TABLET | Refills: 1 | Status: SHIPPED | OUTPATIENT
Start: 2023-04-10 | End: 2023-09-29

## 2023-04-10 ASSESSMENT — PAIN SCALES - GENERAL: PAINLEVEL: NO PAIN (0)

## 2023-04-10 NOTE — PROGRESS NOTES
"  Assessment & Plan     Benign essential hypertension  Stable  Keep monitoring   - carvedilol (COREG) 25 MG tablet; TAKE ONE TABLET (25 MG) BY MOUTH  - TSH with free T4 reflex; Future    Obesity (BMI 30-39.9)  Improving, encouraged her to continue working on life style modification including low fat/carb diet with regular exercise     Hyperlipidemia LDL goal <130  Stable, keep monitoring and recheck in 6 months            BMI:   Estimated body mass index is 31.98 kg/m  as calculated from the following:    Height as of this encounter: 1.581 m (5' 2.25\").    Weight as of this encounter: 79.9 kg (176 lb 4 oz).   Weight management plan: Discussed healthy diet and exercise guidelines    FUTURE APPOINTMENTS:       - Follow-up visit in 6 months     Dilan Soto MD  [facility] JORJE Summers is a 62 year old, presenting for the following health issues:  RECHECK (BP check and medications)         View : No data to display.              History of Present Illness       Hypertension: She presents for follow up of hypertension.  She does check blood pressure  regularly outside of the clinic. Outpatient blood pressures have not been over 140/90. She does not follow a low salt diet.     She eats 2-3 servings of fruits and vegetables daily.She consumes 0 sweetened beverage(s) daily.She exercises with enough effort to increase her heart rate 10 to 19 minutes per day.  She exercises with enough effort to increase her heart rate 3 or less days per week.   She is taking medications regularly.       Diabetes Follow-up    How often are you checking your blood sugar? A few times a week  What time of day are you checking your blood sugars (select all that apply)?  Before meals  Have you had any blood sugars above 200?  No  Have you had any blood sugars below 70?  No    What symptoms do you notice when your blood sugar is low?  None    What concerns do you have today about your diabetes? None     Do you " "have any of these symptoms? (Select all that apply)  No numbness or tingling in feet.  No redness, sores or blisters on feet.  No complaints of excessive thirst.  No reports of blurry vision.  No significant changes to weight.              Hyperlipidemia Follow-Up      Are you regularly taking any medication or supplement to lower your cholesterol?   No    Are you having muscle aches or other side effects that you think could be caused by your cholesterol lowering medication?  No    Hypertension Follow-up      Do you check your blood pressure regularly outside of the clinic? No     Are you following a low salt diet? No    Are your blood pressures ever more than 140 on the top number (systolic) OR more   than 90 on the bottom number (diastolic), for example 140/90? No    BP Readings from Last 2 Encounters:   04/10/23 134/70   10/11/22 128/74     LDL Cholesterol Calculated (mg/dL)   Date Value   10/11/2022 102 (H)   09/24/2021 123 (H)   08/24/2020 127 (H)   06/17/2019 104 (H)     LDL Cholesterol Direct (mg/dL)   Date Value   04/29/2022 140 (H)             Review of Systems   Constitutional, HEENT, cardiovascular, pulmonary, gi and gu systems are negative, except as otherwise noted.      Objective    /70 (BP Location: Right arm, Patient Position: Sitting, Cuff Size: Adult Regular)   Pulse 67   Temp 98.5  F (36.9  C) (Temporal)   Resp 16   Ht 1.581 m (5' 2.25\")   Wt 79.9 kg (176 lb 4 oz)   LMP 02/23/2013   SpO2 100%   BMI 31.98 kg/m    Body mass index is 31.98 kg/m .  Physical Exam   GENERAL: healthy, alert and no distress  EYES: Eyes grossly normal to inspection, PERRL and conjunctivae and sclerae normal  HENT: ear canals and TM's normal, nose and mouth without ulcers or lesions  NECK: no adenopathy, no asymmetry, masses, or scars and thyroid normal to palpation  RESP: lungs clear to auscultation - no rales, rhonchi or wheezes  CV: regular rate and rhythm, normal S1 S2, no S3 or S4, no murmur, click or " rub, no peripheral edema and peripheral pulses strong  ABDOMEN: soft, nontender, no hepatosplenomegaly, no masses and bowel sounds normal  MS: no gross musculoskeletal defects noted, no edema  SKIN: no suspicious lesions or rashes  NEURO: Normal strength and tone, mentation intact and speech normal  BACK: no CVA tenderness, no paralumbar tenderness  PSYCH: mentation appears normal, affect normal/bright

## 2023-05-24 DIAGNOSIS — R73.03 PRE-DIABETES: ICD-10-CM

## 2023-05-24 DIAGNOSIS — I10 BENIGN ESSENTIAL HYPERTENSION: ICD-10-CM

## 2023-05-24 RX ORDER — METFORMIN HCL 500 MG
TABLET, EXTENDED RELEASE 24 HR ORAL
Qty: 90 TABLET | Refills: 1 | Status: SHIPPED | OUTPATIENT
Start: 2023-05-24 | End: 2023-11-20

## 2023-09-08 DIAGNOSIS — I10 BENIGN ESSENTIAL HYPERTENSION: ICD-10-CM

## 2023-09-08 RX ORDER — LOSARTAN POTASSIUM 100 MG/1
TABLET ORAL
Qty: 90 TABLET | Refills: 1 | Status: SHIPPED | OUTPATIENT
Start: 2023-09-08 | End: 2024-03-07

## 2023-09-29 DIAGNOSIS — I10 BENIGN ESSENTIAL HYPERTENSION: ICD-10-CM

## 2023-09-29 RX ORDER — CARVEDILOL 25 MG/1
TABLET ORAL
Qty: 90 TABLET | Refills: 0 | Status: SHIPPED | OUTPATIENT
Start: 2023-09-29 | End: 2023-12-26

## 2023-10-09 ASSESSMENT — ENCOUNTER SYMPTOMS
CHILLS: 0
NERVOUS/ANXIOUS: 0
JOINT SWELLING: 0
HEMATURIA: 0
SHORTNESS OF BREATH: 0
ARTHRALGIAS: 0
DIZZINESS: 0
BREAST MASS: 0
DIARRHEA: 0
EYE PAIN: 0
HEMATOCHEZIA: 0
ABDOMINAL PAIN: 0
MYALGIAS: 0
HEADACHES: 0
COUGH: 0
FEVER: 0
SORE THROAT: 0
FREQUENCY: 0
HEARTBURN: 0
WEAKNESS: 0
CONSTIPATION: 0
DYSURIA: 0
NAUSEA: 0
PARESTHESIAS: 0
PALPITATIONS: 0

## 2023-10-13 ENCOUNTER — OFFICE VISIT (OUTPATIENT)
Dept: FAMILY MEDICINE | Facility: CLINIC | Age: 63
End: 2023-10-13
Payer: COMMERCIAL

## 2023-10-13 VITALS
TEMPERATURE: 98.3 F | RESPIRATION RATE: 15 BRPM | HEIGHT: 63 IN | DIASTOLIC BLOOD PRESSURE: 80 MMHG | SYSTOLIC BLOOD PRESSURE: 142 MMHG | OXYGEN SATURATION: 99 % | HEART RATE: 63 BPM | WEIGHT: 166.2 LBS | BODY MASS INDEX: 29.45 KG/M2

## 2023-10-13 DIAGNOSIS — R73.09 ELEVATED GLUCOSE: ICD-10-CM

## 2023-10-13 DIAGNOSIS — R63.5 ABNORMAL WEIGHT GAIN: ICD-10-CM

## 2023-10-13 DIAGNOSIS — Z00.00 HEALTH MAINTENANCE EXAMINATION: Primary | ICD-10-CM

## 2023-10-13 LAB
ALBUMIN SERPL BCG-MCNC: 4.5 G/DL (ref 3.5–5.2)
ALP SERPL-CCNC: 73 U/L (ref 35–104)
ALT SERPL W P-5'-P-CCNC: 13 U/L (ref 0–50)
ANION GAP SERPL CALCULATED.3IONS-SCNC: 12 MMOL/L (ref 7–15)
AST SERPL W P-5'-P-CCNC: 22 U/L (ref 0–45)
BILIRUB SERPL-MCNC: 0.8 MG/DL
BUN SERPL-MCNC: 15.8 MG/DL (ref 8–23)
CALCIUM SERPL-MCNC: 9.8 MG/DL (ref 8.8–10.2)
CHLORIDE SERPL-SCNC: 101 MMOL/L (ref 98–107)
CHOLEST SERPL-MCNC: 203 MG/DL
CREAT SERPL-MCNC: 0.71 MG/DL (ref 0.51–0.95)
DEPRECATED HCO3 PLAS-SCNC: 27 MMOL/L (ref 22–29)
EGFRCR SERPLBLD CKD-EPI 2021: >90 ML/MIN/1.73M2
ERYTHROCYTE [DISTWIDTH] IN BLOOD BY AUTOMATED COUNT: 12.9 % (ref 10–15)
GLUCOSE SERPL-MCNC: 91 MG/DL (ref 70–99)
HBA1C MFR BLD: 5.9 % (ref 0–5.6)
HCT VFR BLD AUTO: 41.3 % (ref 35–47)
HDLC SERPL-MCNC: 65 MG/DL
HGB BLD-MCNC: 13.9 G/DL (ref 11.7–15.7)
LDLC SERPL CALC-MCNC: 113 MG/DL
MCH RBC QN AUTO: 29.5 PG (ref 26.5–33)
MCHC RBC AUTO-ENTMCNC: 33.7 G/DL (ref 31.5–36.5)
MCV RBC AUTO: 88 FL (ref 78–100)
NONHDLC SERPL-MCNC: 138 MG/DL
PLATELET # BLD AUTO: 280 10E3/UL (ref 150–450)
POTASSIUM SERPL-SCNC: 4.3 MMOL/L (ref 3.4–5.3)
PROT SERPL-MCNC: 7.5 G/DL (ref 6.4–8.3)
RBC # BLD AUTO: 4.71 10E6/UL (ref 3.8–5.2)
SODIUM SERPL-SCNC: 140 MMOL/L (ref 135–145)
TRIGL SERPL-MCNC: 125 MG/DL
WBC # BLD AUTO: 4.4 10E3/UL (ref 4–11)

## 2023-10-13 PROCEDURE — 85027 COMPLETE CBC AUTOMATED: CPT | Performed by: FAMILY MEDICINE

## 2023-10-13 PROCEDURE — 90480 ADMN SARSCOV2 VAC 1/ONLY CMP: CPT | Performed by: FAMILY MEDICINE

## 2023-10-13 PROCEDURE — 91320 SARSCV2 VAC 30MCG TRS-SUC IM: CPT | Performed by: FAMILY MEDICINE

## 2023-10-13 PROCEDURE — 80061 LIPID PANEL: CPT | Performed by: FAMILY MEDICINE

## 2023-10-13 PROCEDURE — 83036 HEMOGLOBIN GLYCOSYLATED A1C: CPT | Performed by: FAMILY MEDICINE

## 2023-10-13 PROCEDURE — 36415 COLL VENOUS BLD VENIPUNCTURE: CPT | Performed by: FAMILY MEDICINE

## 2023-10-13 PROCEDURE — 80053 COMPREHEN METABOLIC PANEL: CPT | Performed by: FAMILY MEDICINE

## 2023-10-13 PROCEDURE — 90471 IMMUNIZATION ADMIN: CPT | Performed by: FAMILY MEDICINE

## 2023-10-13 PROCEDURE — 99396 PREV VISIT EST AGE 40-64: CPT | Mod: 25 | Performed by: FAMILY MEDICINE

## 2023-10-13 PROCEDURE — 90682 RIV4 VACC RECOMBINANT DNA IM: CPT | Performed by: FAMILY MEDICINE

## 2023-10-13 RX ORDER — TOPIRAMATE 25 MG/1
TABLET, FILM COATED ORAL
Qty: 100 TABLET | Refills: 0 | Status: SHIPPED | OUTPATIENT
Start: 2023-10-13 | End: 2024-04-19

## 2023-10-13 RX ORDER — LATANOPROST 50 UG/ML
SOLUTION/ DROPS OPHTHALMIC
COMMUNITY
Start: 2023-10-11

## 2023-10-13 RX ORDER — TOPIRAMATE 25 MG/1
TABLET, FILM COATED ORAL
Qty: 163 TABLET | OUTPATIENT
Start: 2023-10-13

## 2023-10-13 ASSESSMENT — ENCOUNTER SYMPTOMS
NAUSEA: 0
HEMATOCHEZIA: 0
SHORTNESS OF BREATH: 0
HEARTBURN: 0
DIZZINESS: 0
PALPITATIONS: 0
HEMATURIA: 0
NERVOUS/ANXIOUS: 0
SORE THROAT: 0
MYALGIAS: 0
CONSTIPATION: 0
FREQUENCY: 0
WEAKNESS: 0
CHILLS: 0
DIARRHEA: 0
ABDOMINAL PAIN: 0
COUGH: 0
BREAST MASS: 0
JOINT SWELLING: 0
PARESTHESIAS: 0
ARTHRALGIAS: 0
HEADACHES: 0
FEVER: 0
EYE PAIN: 0
DYSURIA: 0

## 2023-10-13 ASSESSMENT — PAIN SCALES - GENERAL: PAINLEVEL: MODERATE PAIN (4)

## 2023-10-13 NOTE — PROGRESS NOTES
SUBJECTIVE:   CC: Melina is an 63 year old who presents for preventive health visit.       10/13/2023     7:00 AM   Additional Questions   Roomed by sierra       Healthy Habits:     Getting at least 3 servings of Calcium per day:  Yes    Bi-annual eye exam:  Yes    Dental care twice a year:  Yes    Sleep apnea or symptoms of sleep apnea:  None    Diet:  Regular (no restrictions)    Frequency of exercise:  4-5 days/week    Duration of exercise:  30-45 minutes    Taking medications regularly:  Yes    Medication side effects:  Not applicable    Additional concerns today:  No    Have you ever done Advance Care Planning? (For example, a Health Directive, POLST, or a discussion with a medical provider or your loved ones about your wishes): Yes, advance care planning is on file.    Social History     Tobacco Use    Smoking status: Never    Smokeless tobacco: Never   Substance Use Topics    Alcohol use: Yes     Comment: 4 times a year             10/9/2023    12:54 PM   Alcohol Use   Prescreen: >3 drinks/day or >7 drinks/week? No          No data to display              Reviewed orders with patient.  Reviewed health maintenance and updated orders accordingly - Yes  BP Readings from Last 3 Encounters:   10/13/23 (!) 152/80   04/10/23 134/70   10/11/22 128/74    Wt Readings from Last 3 Encounters:   10/13/23 75.4 kg (166 lb 3.2 oz)   04/10/23 79.9 kg (176 lb 4 oz)   10/11/22 88.9 kg (196 lb)                  Patient Active Problem List   Diagnosis    Female stress incontinence    Status post laparoscopic cholecystectomy    Osteoarthritis of both knees    Obesity (BMI 30-39.9)    BRIT (obstructive sleep apnea)    Hyperlipidemia LDL goal <130    Hypertension goal BP (blood pressure) < 140/90    Chest pain    Antineoplastic antibiotics causing adverse effect in therapeutic use    Benign essential hypertension    Drugs and medicinal substances causing adverse effect in therapeutic use, initial encounter     Past Surgical  "History:   Procedure Laterality Date    BIOPSY  2015    Breast cancer    COLONOSCOPY  05/2006    ormal to lt transverse colon, incomplete due to \"extremely redundant colon\"\"    COLONOSCOPY N/A 10/12/2020    Procedure: COLONOSCOPY;  Surgeon: Rebecca Tomlinson MD;  Location:  GI    D & C  09/2004    EXTRACT LENS CLEAR, EXCHANGE LENS REFRACTIVE, COMBINED  03/2013    bilateral    GRAFT FAT TO BREAST Bilateral 11/18/2016    Procedure: GRAFT FAT TO BREAST;  Surgeon: Alhaji Patton MD;  [facility]    GRAFT FAT TO BREAST  12/14/2018    Procedure: GRAFT FAT  FROM ABDOMEN TO BILATERAL  BREAST;  Surgeon: Alhaji Patton MD;  [facility]:  [facility]    GRAFT FREE VASCULARIZED TRANSVERSE RECTUS ABDOMINIS MYOCUTANEOUS Bilateral 04/12/2016    Procedure: GRAFT FREE VASCULARIZED TRANSVERSE RECTUS ABDOMINIS MYOCUTANEOUS;  Surgeon: Alhaji Patton MD;  Location:  OR     ABLATION, ENDOMETRIAL, THERMAL, W/O HYSTEROSCOPIC GUIDANCE  11/2004    NovaSure endometrial ablation.     HC COLON AIR CONTRAST  05/2006    normal    HC SLING OPERATION FOR STRESS INCONTINENCE  01/2007    [facility]arc sling procedure    INSERT PORT VASCULAR ACCESS Left 11/09/2015    Procedure: INSERT PORT VASCULAR ACCESS;  Surgeon: August Rdz MD;  Location: [facility]    INSERT TISSUE EXPANDER BREAST BILATERAL Bilateral 09/29/2015    Procedure: INSERT TISSUE EXPANDER BREAST BILATERAL;  Surgeon: Alhaji Patton MD;  Location:  OR    LAPAROSCOPIC CHOLECYSTECTOMY WITH CHOLANGIOGRAMS  01/2005    Laparoscopic cholecystectomy with intraoperative  cholangiogram    MASTECTOMY SIMPLE BILATERAL, SENTINEL NODE BILATERAL, COMBINED Bilateral 09/29/2015    Procedure: COMBINED MASTECTOMY SIMPLE BILATERAL, SENTINEL NODE BILATERAL;  Surgeon: August Rdz MD;  Location:  OR    ORTHOPEDIC SURGERY      left rotator cuff repair    RECONSTRUCT BREAST BILATERAL Bilateral 11/18/2016    Procedure: RECONSTRUCT BREAST BILATERAL;  Surgeon: " Alhaji Patton MD;  [facility]:  [facility]    REMOVE PORT VASCULAR ACCESS N/A 11/18/2016    Procedure: REMOVE PORT VASCULAR ACCESS;  Surgeon: Alhaji Patton MD;  [facility]:  [facility]    REMOVE TISSUE EXPANDER BREAST Bilateral 04/12/2016    Procedure: REMOVE TISSUE EXPANDER BREAST;  Surgeon: Alhaji Patton MD;  Location:  OR    REVISE RECONSTRUCTED BREAST BILATERAL Bilateral 12/14/2018    Procedure: REVISION OF BILATERAL BREAST RECONSTRUCTION ( VASER );  Surgeon: Alhaji Patton MD;  [facility]:  [facility]    REVISE SCAR TRUNK N/A 11/18/2016    Procedure: REVISE SCAR TRUNK;  Surgeon: Alhaji Patton MD;  Location: [facility]    TONSILLECTOMY  2001    VASCULAR SURGERY      insert port and removed       Social History     Tobacco Use    Smoking status: Never    Smokeless tobacco: Never   Substance Use Topics    Alcohol use: Yes     Comment: 4 times a year     Family History   Problem Relation Age of Onset    Asthma Daughter     C.A.D. Father 40        MI    Cerebrovascular Disease Father     Coronary Artery Disease Father     Hypertension Mother     Breast Cancer Mother         Living    Thyroid Disease Mother         Diagnosed over 60    Sleep Apnea Mother     Breast Cancer Sister     Hypertension Sister     Hypertension Brother     Heart Disease Brother         pacemaker    Sleep Apnea Brother     Hyperlipidemia Brother     Hypertension Brother     Diabetes Brother         type 2    Sleep Apnea Brother     Hyperlipidemia Brother     Obesity Brother     Myocardial Infarction Maternal Grandmother     Diabetes Paternal Grandmother     Unknown/Adopted Maternal Grandfather     Unknown/Adopted Paternal Grandfather     Diabetes Maternal Aunt     Cancer - colorectal Maternal Uncle     Diabetes Maternal Aunt         with retinopathy leading to blindness    Diabetes Other     Hypertension Sister     Hyperlipidemia Sister     Breast Cancer Sister         Passed    Obesity Sister     Hypertension  Sister     Colon Cancer Other         Mothers brother    Other Cancer Other         Mothers Brother    Other Cancer Niece         passed    Asthma Daughter         only when sick    Thyroid Disease Nephew         Diagnosed at age 9    Breast Cancer Cousin         paternal cousin    Asthma Other         Grandson         Current Outpatient Medications   Medication Sig Dispense Refill    carvedilol (COREG) 25 MG tablet TAKE 1 TABLET BY MOUTH DAILY AS DIRECTED 90 tablet 0    Cyanocobalamin (VITAMIN B12 PO) Take by mouth daily      latanoprost (XALATAN) 0.005 % ophthalmic solution       losartan (COZAAR) 100 MG tablet TAKE 1 TABLET(100 MG) BY MOUTH DAILY 90 tablet 1    metFORMIN (GLUCOPHAGE XR) 500 MG 24 hr tablet TAKE 1 TABLET(500 MG) BY MOUTH DAILY WITH DINNER 90 tablet 1    topiramate (TOPAMAX) 25 MG tablet Take 1 tablet (25 mg) by mouth at bedtime for 10 days, THEN 1 tablet (25 mg) 2 times daily for 45 days. 100 tablet 0    VITAMIN D, CHOLECALCIFEROL, PO Take 3,000 Units by mouth daily       Allergies   Allergen Reactions    Perflutren Lipid Microsphere Other (See Comments)     A substance that helps with imaging the heart muscle with echocardiology    Full body ache starting in the low back.     Keflex [Cephalexin]      Hand swelling, blisters???     Recent Labs   Lab Test 04/10/23  0957 10/11/22  1114 04/29/22  0912 09/24/21  0907 09/24/21  0907 08/24/20  0950 01/28/19  1543 11/30/18  1036   LDL  --  102* 140*  --  123* 127*   < >  --    HDL  --  60  --   --  56 66   < >  --    TRIG  --  130  --   --  128 102   < >  --    ALT  --  26  --   --  30 23   < >  --    CR  --  0.60 0.74   < > 0.76 0.61   < > 0.76   GFRESTIMATED  --  >90 >90   < > 86 >90   < > 78   GFRESTBLACK  --   --   --   --   --  >90  --  >90   POTASSIUM  --  4.3 4.5   < > 4.3 4.0   < > 4.0   TSH 1.82  --   --   --   --   --   --   --     < > = values in this interval not displayed.        Breast Cancer Screening:    FHS-7:       10/9/2023    12:58 PM  "  Breast CA Risk Assessment (FHS-7)   Did any of your first-degree relatives have breast or ovarian cancer? Yes   Did any of your relatives have bilateral breast cancer? Yes   Did any man in your family have breast cancer? No   Did any woman in your family have breast and ovarian cancer? Yes   Did any woman in your family have breast cancer before age 50 y? Yes   Do you have 2 or more relatives with breast and/or ovarian cancer? Yes   Do you have 2 or more relatives with breast and/or bowel cancer? Yes     click delete button to remove this line now  Mammogram Screening: Recommended mammography every 1-2 years with patient discussion and risk factor consideration  Pertinent mammograms are reviewed under the imaging tab.    History of abnormal Pap smear: NO - age 30-65 PAP every 5 years with negative HPV co-testing recommended     Reviewed and updated as needed this visit by clinical staff   Tobacco  Allergies  Meds              Reviewed and updated as needed this visit by Provider                 Past Medical History:   Diagnosis Date    Benign hypertension     started medication 3/13    Cancer (H)     breast cancer, right    Chest pain     side effect of Femera    Female stress incontinence 2006    s/p sling procedure    Mixed hyperlipidemia     Motion sickness     Obesity (BMI 30-39.9)     BRIT (obstructive sleep apnea)     not using CPAP, treated with tonsilliectomy    Osteoarthritis of both knees     PONV (postoperative nausea and vomiting)     Status post laparoscopic cholecystectomy 2005    Cholelithiasis with mild subacute hemorrhagic cholecystitis    Supervision of other normal pregnancy      - vaginal    Uncomplicated asthma     with respiratory infections      Past Surgical History:   Procedure Laterality Date    BIOPSY      Breast cancer    COLONOSCOPY  2006    ormal to lt transverse colon, incomplete due to \"extremely redundant colon\"\"    COLONOSCOPY N/A 10/12/2020    " Procedure: COLONOSCOPY;  Surgeon: Rebecca Tomlinson MD;  Location:  GI    D & C  09/2004    EXTRACT LENS CLEAR, EXCHANGE LENS REFRACTIVE, COMBINED  03/2013    bilateral    GRAFT FAT TO BREAST Bilateral 11/18/2016    Procedure: GRAFT FAT TO BREAST;  Surgeon: Alhaji Patton MD;  [facility]    GRAFT FAT TO BREAST  12/14/2018    Procedure: GRAFT FAT  FROM ABDOMEN TO BILATERAL  BREAST;  Surgeon: Alhaji Patton MD;  [facility]  [facility]    GRAFT FREE VASCULARIZED TRANSVERSE RECTUS ABDOMINIS MYOCUTANEOUS Bilateral 04/12/2016    Procedure: GRAFT FREE VASCULARIZED TRANSVERSE RECTUS ABDOMINIS MYOCUTANEOUS;  Surgeon: Alhaji Patton MD;  Location:  OR     ABLATION, ENDOMETRIAL, THERMAL, W/O HYSTEROSCOPIC GUIDANCE  11/2004    NovaSure endometrial ablation.      COLON AIR CONTRAST  05/2006    normal    HC SLING OPERATION FOR STRESS INCONTINENCE  01/2007    [facility] sling procedure    INSERT PORT VASCULAR ACCESS Left 11/09/2015    Procedure: INSERT PORT VASCULAR ACCESS;  Surgeon: August Rdz MD;  Location: [facility]    INSERT TISSUE EXPANDER BREAST BILATERAL Bilateral 09/29/2015    Procedure: INSERT TISSUE EXPANDER BREAST BILATERAL;  Surgeon: Alhaji Patton MD;  Location:  OR    LAPAROSCOPIC CHOLECYSTECTOMY WITH CHOLANGIOGRAMS  01/2005    Laparoscopic cholecystectomy with intraoperative  cholangiogram    MASTECTOMY SIMPLE BILATERAL, SENTINEL NODE BILATERAL, COMBINED Bilateral 09/29/2015    Procedure: COMBINED MASTECTOMY SIMPLE BILATERAL, SENTINEL NODE BILATERAL;  Surgeon: August Rdz MD;  Location:  OR    ORTHOPEDIC SURGERY      left rotator cuff repair    RECONSTRUCT BREAST BILATERAL Bilateral 11/18/2016    Procedure: RECONSTRUCT BREAST BILATERAL;  Surgeon: Alhaji Patton MD;  Location: [facility]    REMOVE PORT VASCULAR ACCESS N/A 11/18/2016    Procedure: REMOVE PORT VASCULAR ACCESS;  Surgeon: Alhaji Patton MD;  Location: [facility]    REMOVE TISSUE  "EXPANDER BREAST Bilateral 04/12/2016    Procedure: REMOVE TISSUE EXPANDER BREAST;  Surgeon: Alhaji Patton MD;  Location:  OR    REVISE RECONSTRUCTED BREAST BILATERAL Bilateral 12/14/2018    Procedure: REVISION OF BILATERAL BREAST RECONSTRUCTION ( VASER );  Surgeon: Alhaji Patton MD;  Location: [facility]    REVISE SCAR TRUNK N/A 11/18/2016    Procedure: REVISE SCAR TRUNK;  Surgeon: Alhaji Patton MD;  [facility]:  [facility]    TONSILLECTOMY  2001    VASCULAR SURGERY      insert port and removed       Review of Systems   Constitutional:  Negative for chills and fever.   HENT:  Negative for congestion, ear pain, hearing loss and sore throat.    Eyes:  Negative for pain and visual disturbance.   Respiratory:  Negative for cough and shortness of breath.    Cardiovascular:  Negative for chest pain, palpitations and peripheral edema.   Gastrointestinal:  Negative for abdominal pain, constipation, diarrhea, heartburn, hematochezia and nausea.   Breasts:  Negative for tenderness, breast mass and discharge.   Genitourinary:  Negative for dysuria, frequency, genital sores, hematuria, pelvic pain, urgency, vaginal bleeding and vaginal discharge.   Musculoskeletal:  Negative for arthralgias, joint swelling and myalgias.   Skin:  Negative for rash.   Neurological:  Negative for dizziness, weakness, headaches and paresthesias.   Psychiatric/Behavioral:  Negative for mood changes. The patient is not nervous/anxious.           OBJECTIVE:   BP (!) 142/80   Pulse 63   Temp 98.3  F (36.8  C) (Tympanic)   Resp 15   Ht 1.593 m (5' 2.7\")   Wt 75.4 kg (166 lb 3.2 oz)   LMP 02/23/2013   SpO2 99%   BMI 29.72 kg/m    Physical Exam  GENERAL: healthy, alert and no distress  EYES: Eyes grossly normal to inspection, PERRL and conjunctivae and sclerae normal  HENT: ear canals and TM's normal, nose and mouth without ulcers or lesions  NECK: no adenopathy, no asymmetry, masses, or scars and thyroid normal to " "palpation  RESP: lungs clear to auscultation - no rales, rhonchi or wheezes  CV: regular rate and rhythm, normal S1 S2, no S3 or S4, no murmur, click or rub, no peripheral edema and peripheral pulses strong  ABDOMEN: soft, nontender, no hepatosplenomegaly, no masses and bowel sounds normal  MS: no gross musculoskeletal defects noted, no edema  SKIN: no suspicious lesions or rashes  NEURO: Normal strength and tone, mentation intact and speech normal        ASSESSMENT/PLAN:       ICD-10-CM    1. Health maintenance examination  Z00.00 CBC with platelets     Comprehensive metabolic panel (BMP + Alb, Alk Phos, ALT, AST, Total. Bili, TP)     Lipid panel reflex to direct LDL Fasting     Hemoglobin A1c     CBC with platelets     Comprehensive metabolic panel (BMP + Alb, Alk Phos, ALT, AST, Total. Bili, TP)     Lipid panel reflex to direct LDL Fasting     Hemoglobin A1c      2. Elevated glucose  R73.09 Hemoglobin A1c     Hemoglobin A1c      3. Abnormal weight gain  R63.5 topiramate (TOPAMAX) 25 MG tablet          Patient has been advised of split billing requirements and indicates understanding: Yes      COUNSELING:  Reviewed preventive health counseling, as reflected in patient instructions      BMI:   Estimated body mass index is 29.72 kg/m  as calculated from the following:    Height as of this encounter: 1.593 m (5' 2.7\").    Weight as of this encounter: 75.4 kg (166 lb 3.2 oz).   Weight management plan: Discussed healthy diet and exercise guidelines      She reports that she has never smoked. She has never used smokeless tobacco.          Dilan Soto MD  [facility]  "

## 2023-11-18 DIAGNOSIS — I10 BENIGN ESSENTIAL HYPERTENSION: ICD-10-CM

## 2023-11-18 DIAGNOSIS — R73.03 PRE-DIABETES: ICD-10-CM

## 2023-11-20 RX ORDER — METFORMIN HCL 500 MG
TABLET, EXTENDED RELEASE 24 HR ORAL
Qty: 90 TABLET | Refills: 2 | Status: SHIPPED | OUTPATIENT
Start: 2023-11-20 | End: 2024-08-15

## 2023-12-26 DIAGNOSIS — I10 BENIGN ESSENTIAL HYPERTENSION: ICD-10-CM

## 2023-12-26 RX ORDER — CARVEDILOL 25 MG/1
TABLET ORAL
Qty: 90 TABLET | Refills: 1 | Status: SHIPPED | OUTPATIENT
Start: 2023-12-26 | End: 2024-03-29

## 2024-03-07 DIAGNOSIS — I10 BENIGN ESSENTIAL HYPERTENSION: ICD-10-CM

## 2024-03-07 RX ORDER — LOSARTAN POTASSIUM 100 MG/1
TABLET ORAL
Qty: 90 TABLET | Refills: 1 | Status: SHIPPED | OUTPATIENT
Start: 2024-03-07

## 2024-03-29 DIAGNOSIS — I10 BENIGN ESSENTIAL HYPERTENSION: ICD-10-CM

## 2024-03-29 RX ORDER — CARVEDILOL 25 MG/1
TABLET ORAL
Qty: 90 TABLET | Refills: 1 | Status: SHIPPED | OUTPATIENT
Start: 2024-03-29

## 2024-04-03 ENCOUNTER — DOCUMENTATION ONLY (OUTPATIENT)
Dept: FAMILY MEDICINE | Facility: CLINIC | Age: 64
End: 2024-04-03
Payer: COMMERCIAL

## 2024-04-03 DIAGNOSIS — R73.03 PRE-DIABETES: ICD-10-CM

## 2024-04-03 DIAGNOSIS — I10 BENIGN ESSENTIAL HYPERTENSION: Primary | ICD-10-CM

## 2024-04-03 NOTE — PROGRESS NOTES
Theresa M Delosreyes has an upcoming lab appointment:    Future Appointments   Date Time Provider Department Center   4/16/2024  9:00 AM [facility]   4/19/2024 11:30 AM Dilan Soto MD [facility]     Patient is scheduled for the following lab(s): pre-appt labs    There is no order available. Please review and place either future orders or HMPO (Review of Health Maintenance Protocol Orders), as appropriate.    There are no preventive care reminders to display for this patient.  Claire Raymond

## 2024-04-16 ENCOUNTER — LAB (OUTPATIENT)
Dept: LAB | Facility: CLINIC | Age: 64
End: 2024-04-16
Payer: COMMERCIAL

## 2024-04-16 DIAGNOSIS — I10 BENIGN ESSENTIAL HYPERTENSION: ICD-10-CM

## 2024-04-16 DIAGNOSIS — R73.03 PRE-DIABETES: ICD-10-CM

## 2024-04-16 LAB — HBA1C MFR BLD: 5.3 % (ref 0–5.6)

## 2024-04-16 PROCEDURE — 82570 ASSAY OF URINE CREATININE: CPT

## 2024-04-16 PROCEDURE — 83721 ASSAY OF BLOOD LIPOPROTEIN: CPT

## 2024-04-16 PROCEDURE — 36415 COLL VENOUS BLD VENIPUNCTURE: CPT

## 2024-04-16 PROCEDURE — 84460 ALANINE AMINO (ALT) (SGPT): CPT

## 2024-04-16 PROCEDURE — 82043 UR ALBUMIN QUANTITATIVE: CPT

## 2024-04-16 PROCEDURE — 80048 BASIC METABOLIC PNL TOTAL CA: CPT

## 2024-04-16 PROCEDURE — 83036 HEMOGLOBIN GLYCOSYLATED A1C: CPT

## 2024-04-16 SDOH — HEALTH STABILITY: PHYSICAL HEALTH: ON AVERAGE, HOW MANY MINUTES DO YOU ENGAGE IN EXERCISE AT THIS LEVEL?: 10 MIN

## 2024-04-16 SDOH — HEALTH STABILITY: PHYSICAL HEALTH: ON AVERAGE, HOW MANY DAYS PER WEEK DO YOU ENGAGE IN MODERATE TO STRENUOUS EXERCISE (LIKE A BRISK WALK)?: 2 DAYS

## 2024-04-16 ASSESSMENT — SOCIAL DETERMINANTS OF HEALTH (SDOH): HOW OFTEN DO YOU GET TOGETHER WITH FRIENDS OR RELATIVES?: MORE THAN THREE TIMES A WEEK

## 2024-04-17 LAB
ALT SERPL W P-5'-P-CCNC: 15 U/L (ref 0–50)
ANION GAP SERPL CALCULATED.3IONS-SCNC: 9 MMOL/L (ref 7–15)
BUN SERPL-MCNC: 13.7 MG/DL (ref 8–23)
CALCIUM SERPL-MCNC: 9.7 MG/DL (ref 8.8–10.2)
CHLORIDE SERPL-SCNC: 104 MMOL/L (ref 98–107)
CREAT SERPL-MCNC: 0.71 MG/DL (ref 0.51–0.95)
CREAT UR-MCNC: 157.4 MG/DL
DEPRECATED HCO3 PLAS-SCNC: 27 MMOL/L (ref 22–29)
EGFRCR SERPLBLD CKD-EPI 2021: >90 ML/MIN/1.73M2
GLUCOSE SERPL-MCNC: 96 MG/DL (ref 70–99)
LDLC SERPL DIRECT ASSAY-MCNC: 127 MG/DL
MICROALBUMIN UR-MCNC: 19.7 MG/L
MICROALBUMIN/CREAT UR: 12.52 MG/G CR (ref 0–25)
POTASSIUM SERPL-SCNC: 4.5 MMOL/L (ref 3.4–5.3)
SODIUM SERPL-SCNC: 140 MMOL/L (ref 135–145)

## 2024-04-19 ENCOUNTER — OFFICE VISIT (OUTPATIENT)
Dept: FAMILY MEDICINE | Facility: CLINIC | Age: 64
End: 2024-04-19
Payer: COMMERCIAL

## 2024-04-19 VITALS
RESPIRATION RATE: 18 BRPM | WEIGHT: 168 LBS | OXYGEN SATURATION: 99 % | BODY MASS INDEX: 30.05 KG/M2 | HEART RATE: 72 BPM | DIASTOLIC BLOOD PRESSURE: 60 MMHG | SYSTOLIC BLOOD PRESSURE: 120 MMHG | TEMPERATURE: 96.9 F

## 2024-04-19 DIAGNOSIS — R63.5 ABNORMAL WEIGHT GAIN: ICD-10-CM

## 2024-04-19 DIAGNOSIS — C50.919 MALIGNANT NEOPLASM OF FEMALE BREAST, UNSPECIFIED ESTROGEN RECEPTOR STATUS, UNSPECIFIED LATERALITY, UNSPECIFIED SITE OF BREAST (H): ICD-10-CM

## 2024-04-19 DIAGNOSIS — I10 BENIGN ESSENTIAL HYPERTENSION: Primary | ICD-10-CM

## 2024-04-19 DIAGNOSIS — E66.09 CLASS 1 OBESITY DUE TO EXCESS CALORIES WITH SERIOUS COMORBIDITY AND BODY MASS INDEX (BMI) OF 32.0 TO 32.9 IN ADULT: ICD-10-CM

## 2024-04-19 DIAGNOSIS — J04.0 ACUTE LARYNGITIS: ICD-10-CM

## 2024-04-19 DIAGNOSIS — E66.811 CLASS 1 OBESITY DUE TO EXCESS CALORIES WITH SERIOUS COMORBIDITY AND BODY MASS INDEX (BMI) OF 32.0 TO 32.9 IN ADULT: ICD-10-CM

## 2024-04-19 DIAGNOSIS — R73.03 PRE-DIABETES: ICD-10-CM

## 2024-04-19 PROCEDURE — 99215 OFFICE O/P EST HI 40 MIN: CPT | Mod: 25 | Performed by: FAMILY MEDICINE

## 2024-04-19 PROCEDURE — 99000 SPECIMEN HANDLING OFFICE-LAB: CPT | Performed by: FAMILY MEDICINE

## 2024-04-19 PROCEDURE — 86301 IMMUNOASSAY TUMOR CA 19-9: CPT | Mod: 90 | Performed by: FAMILY MEDICINE

## 2024-04-19 PROCEDURE — 86304 IMMUNOASSAY TUMOR CA 125: CPT | Performed by: FAMILY MEDICINE

## 2024-04-19 PROCEDURE — 90471 IMMUNIZATION ADMIN: CPT | Performed by: FAMILY MEDICINE

## 2024-04-19 PROCEDURE — 36415 COLL VENOUS BLD VENIPUNCTURE: CPT | Performed by: FAMILY MEDICINE

## 2024-04-19 PROCEDURE — 90715 TDAP VACCINE 7 YRS/> IM: CPT | Performed by: FAMILY MEDICINE

## 2024-04-19 RX ORDER — TOPIRAMATE 25 MG/1
25 TABLET, FILM COATED ORAL 2 TIMES DAILY
Qty: 60 TABLET | Refills: 2 | Status: SHIPPED | OUTPATIENT
Start: 2024-04-19 | End: 2024-07-18

## 2024-04-19 RX ORDER — TOPIRAMATE 25 MG/1
25 TABLET, FILM COATED ORAL 2 TIMES DAILY
Qty: 180 TABLET | OUTPATIENT
Start: 2024-04-19

## 2024-04-19 ASSESSMENT — PAIN SCALES - GENERAL: PAINLEVEL: NO PAIN (0)

## 2024-04-19 NOTE — PROGRESS NOTES
"  Assessment & Plan     Benign essential hypertension  Stable with current dose of meds, has no side effect from the meds, will keep monitoring     Class 1 obesity due to excess calories with serious comorbidity and body mass index (BMI) of 32.0 to 32.9 in adult  Has been working life style modification including low fat/carb diet, currently on topamax, her glucose has been worsening with gained wt, will have her to try GLP-1 if her insurance allowed   - dulaglutide (TRULICITY) 0.75 MG/0.5ML pen; Inject 0.75 mg Subcutaneous every 7 days    Abnormal weight gain  Mentioned above   - topiramate (TOPAMAX) 25 MG tablet; Take 1 tablet (25 mg) by mouth 2 times daily    Pre-diabetes  Mentioned above   - dulaglutide (TRULICITY) 0.75 MG/0.5ML pen; Inject 0.75 mg Subcutaneous every 7 days    Acute laryngitis  Has worsening sx with laryngial erythema and pain with fever  Will have her to try abx   - amoxicillin-clavulanate (AUGMENTIN) 875-125 MG tablet; Take 1 tablet by mouth 2 times daily    Malignant neoplasm of female breast, unspecified estrogen receptor status, unspecified laterality, unspecified site of breast (H)    - ; Future  - Cancer antigen 19-9; Future    Patient has been advised of split billing requirements and indicates understanding: Yes    44 minutes spent by me on the date of the encounter doing chart review, history and exam, documentation and further activities per the note      BMI  Estimated body mass index is 30.05 kg/m  as calculated from the following:    Height as of 10/13/23: 1.593 m (5' 2.7\").    Weight as of this encounter: 76.2 kg (168 lb).   Weight management plan: Discussed healthy diet and exercise guidelines    Counseling  Appropriate preventive services were discussed with this patient, including applicable screening as appropriate for fall prevention, nutrition, physical activity, Tobacco-use cessation, weight loss and cognition.  Checklist reviewing preventive services available has " been given to the patient.  Reviewed patient's diet, addressing concerns and/or questions.   She is at risk for lack of exercise and has been provided with information to increase physical activity for the benefit of her well-being.       FUTURE APPOINTMENTS:       - Follow-up visit in 6 months for CPE    [place]   Melina is a 63 year old, presenting for the following health issues:  Recheck Medication and Hypertension        4/19/2024    10:28 AM   Additional Questions   Roomed by Barbara   Accompanied by      History of Present Illness       Hypertension: She presents for follow up of hypertension.  She does not check blood pressure  regularly outside of the clinic. Outpatient blood pressures have not been over 140/90. She does not follow a low salt diet. She exercises with enough effort to increase her heart rate 10 to 19 minutes per day.  She exercises with enough effort to increase her heart rate 3 or less days per week.   She is taking medications regularly.         Diabetes Follow-up    How often are you checking your blood sugar? One time daily  What time of day are you checking your blood sugars (select all that apply)?  Before meals  Have you had any blood sugars above 200?  No  Have you had any blood sugars below 70?  No  What symptoms do you notice when your blood sugar is low?  None  What concerns do you have today about your diabetes? None   Do you have any of these symptoms? (Select all that apply)  No numbness or tingling in feet.  No redness, sores or blisters on feet.  No complaints of excessive thirst.  No reports of blurry vision.  No significant changes to weight.      BP Readings from Last 2 Encounters:   04/19/24 120/60   10/13/23 (!) 142/80     Hemoglobin A1C (%)   Date Value   04/16/2024 5.3   10/13/2023 5.9 (H)     LDL Cholesterol Calculated (mg/dL)   Date Value   10/13/2023 113 (H)   10/11/2022 102 (H)   08/24/2020 127 (H)   06/17/2019 104 (H)     LDL Cholesterol Direct (mg/dL)    Date Value   04/16/2024 127 (H)   04/29/2022 140 (H)             Hypertension Follow-up    Do you check your blood pressure regularly outside of the clinic? Yes   Are you following a low salt diet? Yes  Are your blood pressures ever more than 140 on the top number (systolic) OR more   than 90 on the bottom number (diastolic), for example 140/90? Yes  Acute Illness  Acute illness concerns: sore throat   Onset/Duration: 2 days   Symptoms:  Fever: YES  Chills/Sweats: YES  Headache (location?): YES  Sinus Pressure: No  Conjunctivitis:  No  Ear Pain: no  Rhinorrhea: No  Congestion: No  Sore Throat: YES  Cough: no  Wheeze: No  Decreased Appetite: No  Nausea: No  Vomiting: No  Diarrhea: No  Dysuria/Freq.: No  Dysuria or Hematuria: No  Fatigue/Achiness: No  Sick/Strep Exposure: No  Therapies tried and outcome: None      Review of Systems  Constitutional, HEENT, cardiovascular, pulmonary, GI, , musculoskeletal, neuro, skin, endocrine and psych systems are negative, except as otherwise noted.      Objective    /60   Pulse 72   Temp 96.9  F (36.1  C) (Tympanic)   Resp 18   Wt 76.2 kg (168 lb)   LMP 02/23/2013   SpO2 99%   BMI 30.05 kg/m    Body mass index is 30.05 kg/m .  Physical Exam   GENERAL: alert and no distress  EYES: Eyes grossly normal to inspection, PERRL and conjunctivae and sclerae normal  HENT: ear canals and TM's normal, nose and mouth without ulcers or lesions  NECK: no adenopathy, no asymmetry, masses, or scars  RESP: lungs clear to auscultation - no rales, rhonchi or wheezes  CV: regular rate and rhythm, normal S1 S2, no S3 or S4, no murmur, click or rub, no peripheral edema  ABDOMEN: soft, nontender, no hepatosplenomegaly, no masses and bowel sounds normal  MS: no gross musculoskeletal defects noted, no edema  SKIN: no suspicious lesions or rashes  NEURO: Normal strength and tone, mentation intact and speech normal  BACK: no CVA tenderness, no paralumbar tenderness  PSYCH: mentation appears  normal, affect normal/bright    The longitudinal plan of care for the diagnosis(es)/condition(s) as documented were addressed during this visit. Due to the added complexity in care, I will continue to support Melina in the subsequent management and with ongoing continuity of care.          Signed Electronically by: Dilan Soto MD

## 2024-04-20 LAB — CANCER AG19-9 SERPL IA-ACNC: <2 U/ML

## 2024-04-21 LAB — CANCER AG125 SERPL-ACNC: 9 U/ML

## 2024-07-18 DIAGNOSIS — R63.5 ABNORMAL WEIGHT GAIN: ICD-10-CM

## 2024-07-18 RX ORDER — TOPIRAMATE 25 MG/1
25 TABLET, FILM COATED ORAL 2 TIMES DAILY
Qty: 60 TABLET | Refills: 2 | Status: SHIPPED | OUTPATIENT
Start: 2024-07-18

## 2024-08-15 DIAGNOSIS — R73.03 PRE-DIABETES: ICD-10-CM

## 2024-08-15 DIAGNOSIS — I10 BENIGN ESSENTIAL HYPERTENSION: ICD-10-CM

## 2024-08-15 RX ORDER — METFORMIN HCL 500 MG
TABLET, EXTENDED RELEASE 24 HR ORAL
Qty: 90 TABLET | Refills: 0 | Status: SHIPPED | OUTPATIENT
Start: 2024-08-15

## 2024-10-16 DIAGNOSIS — R63.5 ABNORMAL WEIGHT GAIN: ICD-10-CM

## 2024-10-16 RX ORDER — TOPIRAMATE 25 MG/1
25 TABLET, FILM COATED ORAL 2 TIMES DAILY
Qty: 60 TABLET | Refills: 2 | Status: SHIPPED | OUTPATIENT
Start: 2024-10-16

## 2024-10-21 SDOH — HEALTH STABILITY: PHYSICAL HEALTH: ON AVERAGE, HOW MANY DAYS PER WEEK DO YOU ENGAGE IN MODERATE TO STRENUOUS EXERCISE (LIKE A BRISK WALK)?: 3 DAYS

## 2024-10-21 SDOH — HEALTH STABILITY: PHYSICAL HEALTH: ON AVERAGE, HOW MANY MINUTES DO YOU ENGAGE IN EXERCISE AT THIS LEVEL?: 20 MIN

## 2024-10-21 ASSESSMENT — SOCIAL DETERMINANTS OF HEALTH (SDOH): HOW OFTEN DO YOU GET TOGETHER WITH FRIENDS OR RELATIVES?: ONCE A WEEK

## 2024-10-24 ENCOUNTER — OFFICE VISIT (OUTPATIENT)
Dept: FAMILY MEDICINE | Facility: CLINIC | Age: 64
End: 2024-10-24
Payer: COMMERCIAL

## 2024-10-24 ENCOUNTER — TELEPHONE (OUTPATIENT)
Dept: FAMILY MEDICINE | Facility: CLINIC | Age: 64
End: 2024-10-24

## 2024-10-24 VITALS
HEIGHT: 62 IN | RESPIRATION RATE: 17 BRPM | HEART RATE: 69 BPM | OXYGEN SATURATION: 99 % | WEIGHT: 174 LBS | BODY MASS INDEX: 32.02 KG/M2 | SYSTOLIC BLOOD PRESSURE: 122 MMHG | DIASTOLIC BLOOD PRESSURE: 66 MMHG | TEMPERATURE: 97.9 F

## 2024-10-24 DIAGNOSIS — E66.811 CLASS 1 OBESITY WITH SERIOUS COMORBIDITY AND BODY MASS INDEX (BMI) OF 31.0 TO 31.9 IN ADULT, UNSPECIFIED OBESITY TYPE: ICD-10-CM

## 2024-10-24 DIAGNOSIS — E78.5 HYPERLIPIDEMIA LDL GOAL <130: ICD-10-CM

## 2024-10-24 DIAGNOSIS — J20.9 ACUTE BRONCHITIS WITH SYMPTOMS > 10 DAYS: ICD-10-CM

## 2024-10-24 DIAGNOSIS — Z12.4 CERVICAL CANCER SCREENING: ICD-10-CM

## 2024-10-24 DIAGNOSIS — Z00.00 HEALTH MAINTENANCE EXAMINATION: Primary | ICD-10-CM

## 2024-10-24 DIAGNOSIS — R73.03 PRE-DIABETES: ICD-10-CM

## 2024-10-24 DIAGNOSIS — R06.02 SOB (SHORTNESS OF BREATH): ICD-10-CM

## 2024-10-24 DIAGNOSIS — R63.5 ABNORMAL WEIGHT GAIN: ICD-10-CM

## 2024-10-24 PROCEDURE — 99396 PREV VISIT EST AGE 40-64: CPT | Performed by: FAMILY MEDICINE

## 2024-10-24 PROCEDURE — 99213 OFFICE O/P EST LOW 20 MIN: CPT | Mod: 25 | Performed by: FAMILY MEDICINE

## 2024-10-24 RX ORDER — AZITHROMYCIN 250 MG/1
TABLET, FILM COATED ORAL
Qty: 6 TABLET | Refills: 0 | Status: SHIPPED | OUTPATIENT
Start: 2024-10-24 | End: 2024-10-29

## 2024-10-24 RX ORDER — PREDNISONE 20 MG/1
20 TABLET ORAL 2 TIMES DAILY
Qty: 10 TABLET | Refills: 0 | Status: SHIPPED | OUTPATIENT
Start: 2024-10-24

## 2024-10-24 RX ORDER — ALBUTEROL SULFATE 90 UG/1
2 INHALANT RESPIRATORY (INHALATION) EVERY 6 HOURS PRN
Qty: 18 G | Refills: 1 | Status: SHIPPED | OUTPATIENT
Start: 2024-10-24

## 2024-10-24 RX ORDER — BRIMONIDINE TARTRATE 2 MG/ML
1 SOLUTION/ DROPS OPHTHALMIC 2 TIMES DAILY
COMMUNITY
Start: 2024-10-06

## 2024-10-24 ASSESSMENT — PAIN SCALES - GENERAL: PAINLEVEL_OUTOF10: NO PAIN (0)

## 2024-10-24 NOTE — PROGRESS NOTES
Preventive Care Visit  [facility] JORJE Soto MD, Family Medicine  Oct 24, 2024      Assessment & Plan     Hyperlipidemia LDL goal <130    - Lipid panel reflex to direct LDL Non-fasting; Future    Cervical cancer screening  Done with GYN 2022 and was normal, abstracted     Health maintenance examination    - Lipid panel reflex to direct LDL Non-fasting; Future  - CBC with platelets; Future  - Comprehensive metabolic panel (BMP + Alb, Alk Phos, ALT, AST, Total. Bili, TP); Future  - Hemoglobin A1c; Future    Abnormal weight gain  Will have her to discuss about compounding meds with  compounding pharmacy and let us know if she is interested   - Hemoglobin A1c; Future    Pre-diabetes  Mentioned above   - Hemoglobin A1c; Future    Class 1 obesity with serious comorbidity and body mass index (BMI) of 31.0 to 31.9 in adult, unspecified obesity type  Mentioned above   - Comprehensive metabolic panel (BMP + Alb, Alk Phos, ALT, AST, Total. Bili, TP); Future  - Hemoglobin A1c; Future    Acute bronchitis with symptoms > 10 days  Has been over 2 weeks with cough and SOB  Will treat with abx   - azithromycin (ZITHROMAX) 250 MG tablet; Take 2 tablets (500 mg) by mouth daily for 1 day, THEN 1 tablet (250 mg) daily for 4 days.  - predniSONE (DELTASONE) 20 MG tablet; Take 1 tablet (20 mg) by mouth 2 times daily.  - albuterol (PROAIR HFA/PROVENTIL HFA/VENTOLIN HFA) 108 (90 Base) MCG/ACT inhaler; Inhale 2 puffs into the lungs every 6 hours as needed for shortness of breath, wheezing or cough.    SOB (shortness of breath)  Mentioned above   - azithromycin (ZITHROMAX) 250 MG tablet; Take 2 tablets (500 mg) by mouth daily for 1 day, THEN 1 tablet (250 mg) daily for 4 days.  - predniSONE (DELTASONE) 20 MG tablet; Take 1 tablet (20 mg) by mouth 2 times daily.  - albuterol (PROAIR HFA/PROVENTIL HFA/VENTOLIN HFA) 108 (90 Base) MCG/ACT inhaler; Inhale 2 puffs into the lungs every 6 hours as needed for shortness  "of breath, wheezing or cough.    Patient has been advised of split billing requirements and indicates understanding: Yes        BMI  Estimated body mass index is 31.83 kg/m  as calculated from the following:    Height as of this encounter: 1.575 m (5' 2\").    Weight as of this encounter: 78.9 kg (174 lb).   Weight management plan: Discussed healthy diet and exercise guidelines    Counseling  Appropriate preventive services were addressed with this patient via screening, questionnaire, or discussion as appropriate for fall prevention, nutrition, physical activity, Tobacco-use cessation, social engagement, weight loss and cognition.  Checklist reviewing preventive services available has been given to the patient.  Reviewed patient's diet, addressing concerns and/or questions.   She is at risk for lack of exercise and has been provided with information to increase physical activity for the benefit of her well-being.       FUTURE APPOINTMENTS:       - Follow-up visit in 6 months     Chivo Summers is a 64 year old, presenting for the following:  Physical        10/24/2024    10:08 AM   Additional Questions   Roomed by Barbara          [facility]      Health Care Directive  Patient has a Health Care Directive on file  Advance care planning document is on file and is current.      10/21/2024   General Health   How would you rate your overall physical health? Good   Feel stress (tense, anxious, or unable to sleep) Not at all            10/21/2024   Nutrition   Three or more servings of calcium each day? (!) NO   Diet: Regular (no restrictions)   How many servings of fruit and vegetables per day? (!) 0-1   How many sweetened beverages each day? 0-1            10/21/2024   Exercise   Days per week of moderate/strenous exercise 3 days   Average minutes spent exercising at this level 20 min            10/21/2024   Social Factors   Frequency of gathering with friends or relatives Once a week   Worry food won't last until get money " to buy more No   Food not last or not have enough money for food? No   Do you have housing? (Housing is defined as stable permanent housing and does not include staying ouside in a car, in a tent, in an abandoned building, in an overnight shelter, or couch-surfing.) Yes   Are you worried about losing your housing? No   Lack of transportation? No   Unable to get utilities (heat,electricity)? No            10/21/2024   Fall Risk   Fallen 2 or more times in the past year? No    Trouble with walking or balance? No        Patient-reported          10/21/2024   Dental   Dentist two times every year? Yes            4/16/2024   TB Screening   Were you born outside of the [place]? No              Today's PHQ-2 Score:       4/19/2024    10:04 AM   PHQ-2 ( 1999 [employer])   Q1: Little interest or pleasure in doing things 0    Q2: Feeling down, depressed or hopeless 0    PHQ-2 Score 0   Q1: Little interest or pleasure in doing things Not at all   Q2: Feeling down, depressed or hopeless Not at all   PHQ-2 Score 0       Patient-reported         10/21/2024   Substance Use   Alcohol more than 3/day or more than 7/wk No   Do you use any other substances recreationally? No        Social History     Tobacco Use    Smoking status: Never    Smokeless tobacco: Never   Substance Use Topics    Alcohol use: Yes     Comment: 4 times a year    Drug use: No           10/9/2023   LAST FHS-7 RESULTS   1st degree relative breast or ovarian cancer Yes    Any relative bilateral breast cancer Yes    Any male have breast cancer No    Any ONE woman have BOTH breast AND ovarian cancer Yes    Any woman with breast cancer before 50yrs Yes    2 or more relatives with breast AND/OR ovarian cancer Yes    2 or more relatives with breast AND/OR bowel cancer Yes        Patient-reported        Mammogram Screening - Mammogram every 1-2 years updated in Health Maintenance based on mutual decision making        10/21/2024   STI Screening   New sexual partner(s) since last  "STI/HIV test? No        History of abnormal Pap smear: No - age 30- 64 PAP with HPV every 5 years recommended        2022     9:19 AM   PAP / HPV   PAP-ABSTRACT See Scanned Document           This result is from an external source.     ASCVD Risk   The 10-year ASCVD risk score (Cami PAYTON, et al., 2019) is: 5.1%    Values used to calculate the score:      Age: 64 years      Sex: Female      Is Non- : No      Diabetic: No      Tobacco smoker: No      Systolic Blood Pressure: 122 mmHg      Is BP treated: Yes      HDL Cholesterol: 65 mg/dL      Total Cholesterol: 203 mg/dL           Reviewed and updated as needed this visit by Provider                    Past Medical History:   Diagnosis Date    Benign hypertension     started medication 3/13    Cancer (H)     breast cancer, right    Chest pain     side effect of Femera    Female stress incontinence 2006    s/p sling procedure    Mixed hyperlipidemia     Motion sickness     Obesity (BMI 30-39.9)     BRIT (obstructive sleep apnea)     not using CPAP, treated with tonsilliectomy    Osteoarthritis of both knees     PONV (postoperative nausea and vomiting)     Status post laparoscopic cholecystectomy 2005    Cholelithiasis with mild subacute hemorrhagic cholecystitis    Supervision of other normal pregnancy      - vaginal    Uncomplicated asthma     with respiratory infections     Past Surgical History:   Procedure Laterality Date    BIOPSY      Breast cancer    COLONOSCOPY  2006    ormal to lt transverse colon, incomplete due to \"extremely redundant colon\"\"    COLONOSCOPY N/A 10/12/2020    Procedure: COLONOSCOPY;  Surgeon: Rebecca Tomlinson MD;  Location:  GI    D & C  2004    EXTRACT LENS CLEAR, EXCHANGE LENS REFRACTIVE, COMBINED  2013    bilateral    GRAFT FAT TO BREAST Bilateral 2016    Procedure: GRAFT FAT TO BREAST;  Surgeon: Alhaji Patton MD;  Location: [facility]    " GRAFT FAT TO BREAST  12/14/2018    Procedure: GRAFT FAT  FROM ABDOMEN TO BILATERAL  BREAST;  Surgeon: Alhaji Patton MD;  Location:  [facility]    GRAFT FREE VASCULARIZED TRANSVERSE RECTUS ABDOMINIS MYOCUTANEOUS Bilateral 04/12/2016    Procedure: GRAFT FREE VASCULARIZED TRANSVERSE RECTUS ABDOMINIS MYOCUTANEOUS;  Surgeon: Alhaji Patton MD;  Location:  OR    HC ABLATION, ENDOMETRIAL, THERMAL, W/O HYSTEROSCOPIC GUIDANCE  11/2004    NovaSure endometrial ablation.     HC COLON AIR CONTRAST  05/2006    normal    HC SLING OPERATION FOR STRESS INCONTINENCE  01/2007    Monarc sling procedure    INSERT PORT VASCULAR ACCESS Left 11/09/2015    Procedure: INSERT PORT VASCULAR ACCESS;  Surgeon: August Rdz MD;  Location:  SD    INSERT TISSUE EXPANDER BREAST BILATERAL Bilateral 09/29/2015    Procedure: INSERT TISSUE EXPANDER BREAST BILATERAL;  Surgeon: Alhaji Patton MD;  Location:  OR    LAPAROSCOPIC CHOLECYSTECTOMY WITH CHOLANGIOGRAMS  01/2005    Laparoscopic cholecystectomy with intraoperative  cholangiogram    MASTECTOMY SIMPLE BILATERAL, SENTINEL NODE BILATERAL, COMBINED Bilateral 09/29/2015    Procedure: COMBINED MASTECTOMY SIMPLE BILATERAL, SENTINEL NODE BILATERAL;  Surgeon: August Rdz MD;  Location:  OR    ORTHOPEDIC SURGERY      left rotator cuff repair    RECONSTRUCT BREAST BILATERAL Bilateral 11/18/2016    Procedure: RECONSTRUCT BREAST BILATERAL;  Surgeon: Alhaji Patton MD;  Location:  SD    REMOVE PORT VASCULAR ACCESS N/A 11/18/2016    Procedure: REMOVE PORT VASCULAR ACCESS;  Surgeon: Alhaji Patton MD;  Location:  SD    REMOVE TISSUE EXPANDER BREAST Bilateral 04/12/2016    Procedure: REMOVE TISSUE EXPANDER BREAST;  Surgeon: Alhaji Patton MD;  Location:  OR    REVISE RECONSTRUCTED BREAST BILATERAL Bilateral 12/14/2018    Procedure: REVISION OF BILATERAL BREAST RECONSTRUCTION ( KIRSTY );  Surgeon: Alhaji Patton MD;   "Location: [facility]    REVISE SCAR TRUNK N/A 11/18/2016    Procedure: REVISE SCAR TRUNK;  Surgeon: Alhaji Patton MD;  Location: [facility]    TONSILLECTOMY  2001    VASCULAR SURGERY      insert port and removed     Labs reviewed in EPIC  BP Readings from Last 3 Encounters:   10/24/24 122/66   04/19/24 120/60   10/13/23 (!) 142/80    Wt Readings from Last 3 Encounters:   10/24/24 78.9 kg (174 lb)   04/19/24 76.2 kg (168 lb)   10/13/23 75.4 kg (166 lb 3.2 oz)                  Patient Active Problem List   Diagnosis    Female stress incontinence    Status post laparoscopic cholecystectomy    Osteoarthritis of both knees    Obesity (BMI 30-39.9)    BRIT (obstructive sleep apnea)    Hyperlipidemia LDL goal <130    Hypertension goal BP (blood pressure) < 140/90    Chest pain    Antineoplastic antibiotics causing adverse effect in therapeutic use    Benign essential hypertension    Drugs and medicinal substances causing adverse effect in therapeutic use, initial encounter     Past Surgical History:   Procedure Laterality Date    BIOPSY  2015    Breast cancer    COLONOSCOPY  05/2006    ormal to lt transverse colon, incomplete due to \"extremely redundant colon\"\"    COLONOSCOPY N/A 10/12/2020    Procedure: COLONOSCOPY;  Surgeon: Rebecca Tomlinson MD;  Location:  GI    D & C  09/2004    EXTRACT LENS CLEAR, EXCHANGE LENS REFRACTIVE, COMBINED  03/2013    bilateral    GRAFT FAT TO BREAST Bilateral 11/18/2016    Procedure: GRAFT FAT TO BREAST;  Surgeon: Alhaji Patton MD;  Location: [facility]    GRAFT FAT TO BREAST  12/14/2018    Procedure: GRAFT FAT  FROM ABDOMEN TO BILATERAL  BREAST;  Surgeon: Alhaji Patton MD;  Location: [facility]    GRAFT FREE VASCULARIZED TRANSVERSE RECTUS ABDOMINIS MYOCUTANEOUS Bilateral 04/12/2016    Procedure: GRAFT FREE VASCULARIZED TRANSVERSE RECTUS ABDOMINIS MYOCUTANEOUS;  Surgeon: Alhaji Patton MD;  Location:  OR    HC ABLATION, ENDOMETRIAL, THERMAL, W/O " HYSTEROSCOPIC GUIDANCE  11/2004    NovaSure endometrial ablation.     HC COLON AIR CONTRAST  05/2006    normal    HC SLING OPERATION FOR STRESS INCONTINENCE  01/2007    Monarc sling procedure    INSERT PORT VASCULAR ACCESS Left 11/09/2015    Procedure: INSERT PORT VASCULAR ACCESS;  Surgeon: August Rdz MD;  Location: [facility]    INSERT TISSUE EXPANDER BREAST BILATERAL Bilateral 09/29/2015    Procedure: INSERT TISSUE EXPANDER BREAST BILATERAL;  Surgeon: Alhaji Patton MD;  Location:  OR    LAPAROSCOPIC CHOLECYSTECTOMY WITH CHOLANGIOGRAMS  01/2005    Laparoscopic cholecystectomy with intraoperative  cholangiogram    MASTECTOMY SIMPLE BILATERAL, SENTINEL NODE BILATERAL, COMBINED Bilateral 09/29/2015    Procedure: COMBINED MASTECTOMY SIMPLE BILATERAL, SENTINEL NODE BILATERAL;  Surgeon: August Rdz MD;  Location:  OR    ORTHOPEDIC SURGERY      left rotator cuff repair    RECONSTRUCT BREAST BILATERAL Bilateral 11/18/2016    Procedure: RECONSTRUCT BREAST BILATERAL;  Surgeon: Alhaji Patton MD;  Location: [facility]    REMOVE PORT VASCULAR ACCESS N/A 11/18/2016    Procedure: REMOVE PORT VASCULAR ACCESS;  Surgeon: Alhaji Patton MD;  Location:  [facility]    REMOVE TISSUE EXPANDER BREAST Bilateral 04/12/2016    Procedure: REMOVE TISSUE EXPANDER BREAST;  Surgeon: Alhaji Patton MD;  Location:  OR    REVISE RECONSTRUCTED BREAST BILATERAL Bilateral 12/14/2018    Procedure: REVISION OF BILATERAL BREAST RECONSTRUCTION ( VASER );  Surgeon: Alhaji Patton MD;  Location: [facility]    REVISE SCAR TRUNK N/A 11/18/2016    Procedure: REVISE SCAR TRUNK;  Surgeon: Alhaji Patton MD;  Location: [facility]    TONSILLECTOMY  2001    VASCULAR SURGERY      insert port and removed       Social History     Tobacco Use    Smoking status: Never    Smokeless tobacco: Never   Substance Use Topics    Alcohol use: Yes     Comment: 4 times a year     Family History   Problem Relation Age of  Onset    Asthma Daughter     Anxiety Disorder Daughter     C.A.D. Father 40        MI    Cerebrovascular Disease Father     Coronary Artery Disease Father     Hypertension Mother     Breast Cancer Mother         Living    Thyroid Disease Mother         Diagnosed over 60    Sleep Apnea Mother     Breast Cancer Sister     Hypertension Sister     Hypertension Brother     Heart Disease Brother         pacemaker    Sleep Apnea Brother     Hyperlipidemia Brother     Hypertension Brother     Diabetes Brother         type 2    Sleep Apnea Brother     Hyperlipidemia Brother     Obesity Brother     Myocardial Infarction Maternal Grandmother     Diabetes Paternal Grandmother     Unknown/Adopted Maternal Grandfather     Unknown/Adopted Paternal Grandfather     Diabetes Maternal Aunt     Cancer - colorectal Maternal Uncle     Diabetes Maternal Aunt         with retinopathy leading to blindness    Diabetes Other     Hypertension Sister     Hyperlipidemia Sister     Breast Cancer Sister         passed away    Obesity Sister     Hypertension Sister     Colon Cancer Other         Mothers brother    Other Cancer Other         Mothers Brother    Other Cancer Niece         passed    Asthma Daughter         only when sick    Anxiety Disorder Daughter     Thyroid Disease Nephew         Diagnosed at age 9    Breast Cancer Cousin         paternal cousin    Asthma Other         Grandson    Asthma Other         Grandson- when sick         Current Outpatient Medications   Medication Sig Dispense Refill    albuterol (PROAIR HFA/PROVENTIL HFA/VENTOLIN HFA) 108 (90 Base) MCG/ACT inhaler Inhale 2 puffs into the lungs every 6 hours as needed for shortness of breath, wheezing or cough. 18 g 1    azithromycin (ZITHROMAX) 250 MG tablet Take 2 tablets (500 mg) by mouth daily for 1 day, THEN 1 tablet (250 mg) daily for 4 days. 6 tablet 0    brimonidine (ALPHAGAN) 0.2 % ophthalmic solution Place 1 drop into both eyes 2 times daily.      carvedilol  (COREG) 25 MG tablet TAKE 1 TABLET BY MOUTH DAILY AS DIRECTED 90 tablet 1    Cyanocobalamin (VITAMIN B12 PO) Take by mouth daily      latanoprost (XALATAN) 0.005 % ophthalmic solution       losartan (COZAAR) 100 MG tablet TAKE 1 TABLET(100 MG) BY MOUTH DAILY 90 tablet 1    metFORMIN (GLUCOPHAGE XR) 500 MG 24 hr tablet TAKE 1 TABLET(500 MG) BY MOUTH DAILY WITH DINNER 90 tablet 0    predniSONE (DELTASONE) 20 MG tablet Take 1 tablet (20 mg) by mouth 2 times daily. 10 tablet 0    topiramate (TOPAMAX) 25 MG tablet TAKE 1 TABLET(25 MG) BY MOUTH TWICE DAILY 60 tablet 2    VITAMIN D, CHOLECALCIFEROL, PO Take 3,000 Units by mouth daily      amoxicillin-clavulanate (AUGMENTIN) 875-125 MG tablet Take 1 tablet by mouth 2 times daily (Patient not taking: Reported on 10/24/2024) 20 tablet 0    dulaglutide (TRULICITY) 0.75 MG/0.5ML pen Inject 0.75 mg Subcutaneous every 7 days (Patient not taking: Reported on 10/24/2024) 2 mL 2     Allergies   Allergen Reactions    Perflutren Lipid Microsphere Other (See Comments)     A substance that helps with imaging the heart muscle with echocardiology    Full body ache starting in the low back.     Keflex [Cephalexin]      Hand swelling, blisters???     Recent Labs   Lab Test 04/16/24  0859 10/13/23  0720 04/10/23  0957 10/11/22  1114 04/29/22  0912 09/24/21  0907 08/24/20  0950 01/28/19  1543 11/30/18  1036   0000   A1C 5.3 5.9*  --   --   --   --   --   --   --   --    * 113*  --  102*   < > 123* 127*   < >  --    < >   HDL  --  65  --  60  --  56 66   < >  --   --    TRIG  --  125  --  130  --  128 102   < >  --   --    ALT 15 13  --  26  --  30 23   < >  --    < >   CR 0.71 0.71  --  0.60   < > 0.76 0.61   < > 0.76  --    GFRESTIMATED >90 >90  --  >90   < > 86 >90   < > 78  --    GFRESTBLACK  --   --   --   --   --   --  >90  --  >90  --    POTASSIUM 4.5 4.3  --  4.3   < > 4.3 4.0   < > 4.0  --    TSH  --   --  1.82  --   --   --   --   --   --   --     < > = values in this interval  "not displayed.          Review of Systems  Constitutional, neuro, ENT, endocrine, pulmonary, cardiac, gastrointestinal, genitourinary, musculoskeletal, integument and psychiatric systems are negative, except as otherwise noted.     Objective    Exam  /66   Pulse 69   Temp 97.9  F (36.6  C) (Temporal)   Resp 17   Ht 1.575 m (5' 2\")   Wt 78.9 kg (174 lb)   LMP 02/23/2013   SpO2 99%   BMI 31.83 kg/m     Estimated body mass index is 31.83 kg/m  as calculated from the following:    Height as of this encounter: 1.575 m (5' 2\").    Weight as of this encounter: 78.9 kg (174 lb).    Physical Exam  GENERAL: alert and no distress  EYES: Eyes grossly normal to inspection, PERRL and conjunctivae and sclerae normal  HENT: ear canals and TM's normal, nose and mouth without ulcers or lesions  NECK: no adenopathy, no asymmetry, masses, or scars  RESP: lungs clear to auscultation - no rales, rhonchi or wheezes  CV: regular rate and rhythm, normal S1 S2, no S3 or S4, no murmur, click or rub, no peripheral edema  ABDOMEN: soft, nontender, no hepatosplenomegaly, no masses and bowel sounds normal  MS: no gross musculoskeletal defects noted, no edema  SKIN: no suspicious lesions or rashes  NEURO: Normal strength and tone, mentation intact and speech normal  BACK: no CVA tenderness, no paralumbar tenderness        Signed Electronically by: Dilan Soto MD    "

## 2024-10-25 ENCOUNTER — MYC MEDICAL ADVICE (OUTPATIENT)
Dept: FAMILY MEDICINE | Facility: CLINIC | Age: 64
End: 2024-10-25
Payer: COMMERCIAL

## 2024-10-25 DIAGNOSIS — E78.5 HYPERLIPIDEMIA LDL GOAL <130: ICD-10-CM

## 2024-10-25 DIAGNOSIS — R63.5 ABNORMAL WEIGHT GAIN: ICD-10-CM

## 2024-10-25 DIAGNOSIS — R73.03 PRE-DIABETES: ICD-10-CM

## 2024-10-25 NOTE — TELEPHONE ENCOUNTER
I sent the medicine to  compounding pharmacy     She should do VV with me at 4th dose of semaglutide, then we can decide proceeding to the next dose, please let her know       thx

## 2024-10-25 NOTE — TELEPHONE ENCOUNTER
OV yesterday. Pended pharmacy.     Abnormal weight gain  Will have her to discuss about compounding meds with [facility] compounding pharmacy and let us know if she is interested   - Hemoglobin A1c; Future    Please review and advise.    Sherry Anne RN

## 2024-11-13 DIAGNOSIS — R73.03 PRE-DIABETES: ICD-10-CM

## 2024-11-13 DIAGNOSIS — I10 BENIGN ESSENTIAL HYPERTENSION: ICD-10-CM

## 2024-11-13 RX ORDER — METFORMIN HYDROCHLORIDE 500 MG/1
TABLET, EXTENDED RELEASE ORAL
Qty: 90 TABLET | Refills: 2 | Status: SHIPPED | OUTPATIENT
Start: 2024-11-13

## 2024-11-25 ENCOUNTER — VIRTUAL VISIT (OUTPATIENT)
Dept: FAMILY MEDICINE | Facility: CLINIC | Age: 64
End: 2024-11-25
Payer: COMMERCIAL

## 2024-11-25 DIAGNOSIS — E78.5 HYPERLIPIDEMIA LDL GOAL <130: ICD-10-CM

## 2024-11-25 DIAGNOSIS — I10 BENIGN ESSENTIAL HYPERTENSION: ICD-10-CM

## 2024-11-25 DIAGNOSIS — R73.03 PRE-DIABETES: ICD-10-CM

## 2024-11-25 PROCEDURE — 99214 OFFICE O/P EST MOD 30 MIN: CPT | Mod: 95 | Performed by: FAMILY MEDICINE

## 2024-11-25 NOTE — PROGRESS NOTES
Melina is a 64 year old who is being evaluated via a billable video visit.    How would you like to obtain your AVS? MyChart  If the video visit is dropped, the invitation should be resent by: Text to cell phone: [phone]  Will anyone else be joining your video visit? No      Assessment & Plan     BMI 35.0-35.9,adult  Has been improving with GLP-1, will increase the dose of semaglutide to 1mg for next 2 months   Will keep monitoring   - COMPOUNDED NON-CONTROLLED SUBSTANCE (CMPD RX) - PHARMACY TO MIX COMPOUNDED MEDICATION; Semaglutide 1mg subcutaneous injection every 7 days    Benign essential hypertension  Stable   - COMPOUNDED NON-CONTROLLED SUBSTANCE (CMPD RX) - PHARMACY TO MIX COMPOUNDED MEDICATION; Semaglutide 1mg subcutaneous injection every 7 days    Pre-diabetes  stable  - COMPOUNDED NON-CONTROLLED SUBSTANCE (CMPD RX) - PHARMACY TO MIX COMPOUNDED MEDICATION; Semaglutide 1mg subcutaneous injection every 7 days    Hyperlipidemia LDL goal <130    - COMPOUNDED NON-CONTROLLED SUBSTANCE (CMPD RX) - PHARMACY TO MIX COMPOUNDED MEDICATION; Semaglutide 1mg subcutaneous injection every 7 days            FUTURE APPOINTMENTS:       - Follow-up visit in 2 months     Subjective   Melina is a 64 year old, presenting for the following health issues:  Medication Follow-up        11/25/2024     4:04 PM   Additional Questions   Roomed by Bonny ALVAREZ     History of Present Illness       Diabetes:   She presents for follow up of diabetes.    She is not checking blood glucose.      When her blood glucose is low, the patient is asymptomatic for confusion, blurred vision, lethargy and reports not feeling dizzy, shaky, or weak.   She has no concerns regarding her diabetes at this time.   She is not experiencing numbness or burning in feet, excessive thirst, blurry vision, weight changes or redness, sores or blisters on feet.           Reason for visit:  Follow up on new medication   She is taking medications regularly.        Hypertension Follow-up    Do you check your blood pressure regularly outside of the clinic? Yes   Are you following a low salt diet? Yes  Are your blood pressures ever more than 140 on the top number (systolic) OR more   than 90 on the bottom number (diastolic), for example 140/90? Yes  How many servings of fruits and vegetables do you eat daily?  2-3  On average, how many sweetened beverages do you drink each day (Examples: soda, juice, sweet tea, etc.  Do NOT count diet or artificially sweetened beverages)?   0  How many days per week do you exercise enough to make your heart beat faster? 3 or less  How many minutes a day do you exercise enough to make your heart beat faster? 60 or more  How many days per week do you miss taking your medication? 0      Medication Followup of Topiramate and Compounded Substance  Taking Medication as prescribed: yes  Side Effects:  None  Medication Helping Symptoms:  yes      Review of Systems  Constitutional, HEENT, cardiovascular, pulmonary, GI, , musculoskeletal, neuro, skin, endocrine and psych systems are negative, except as otherwise noted.      Objective    Vitals - Patient Reported  Weight (Patient Reported): 78 kg (172 lb)  Pain Score: No Pain (0)        Physical Exam   GENERAL: alert and no distress  EYES: Eyes grossly normal to inspection.  No discharge or erythema, or obvious scleral/conjunctival abnormalities.  RESP: No audible wheeze, cough, or visible cyanosis.    SKIN: Visible skin clear. No significant rash, abnormal pigmentation or lesions.  NEURO: Cranial nerves grossly intact.  Mentation and speech appropriate for age.  PSYCH: Appropriate affect, tone, and pace of words          Video-Visit Details    Type of service:  Video Visit   Originating Location (pt. Location): Home    Distant Location (provider location):  On-site  Platform used for Video Visit: Renetta  Signed Electronically by: Dilan Soto MD      Video started: 4:20  Video completed: 4:52

## 2024-12-04 ENCOUNTER — IMMUNIZATION (OUTPATIENT)
Dept: FAMILY MEDICINE | Facility: CLINIC | Age: 64
End: 2024-12-04
Payer: COMMERCIAL

## 2024-12-04 ENCOUNTER — LAB (OUTPATIENT)
Dept: LAB | Facility: CLINIC | Age: 64
End: 2024-12-04
Payer: COMMERCIAL

## 2024-12-04 DIAGNOSIS — R73.03 PRE-DIABETES: ICD-10-CM

## 2024-12-04 DIAGNOSIS — Z23 ENCOUNTER FOR IMMUNIZATION: Primary | ICD-10-CM

## 2024-12-04 DIAGNOSIS — Z00.00 HEALTH MAINTENANCE EXAMINATION: ICD-10-CM

## 2024-12-04 DIAGNOSIS — E78.5 HYPERLIPIDEMIA LDL GOAL <130: ICD-10-CM

## 2024-12-04 DIAGNOSIS — R63.5 ABNORMAL WEIGHT GAIN: ICD-10-CM

## 2024-12-04 DIAGNOSIS — E66.811 CLASS 1 OBESITY WITH SERIOUS COMORBIDITY AND BODY MASS INDEX (BMI) OF 31.0 TO 31.9 IN ADULT, UNSPECIFIED OBESITY TYPE: ICD-10-CM

## 2024-12-04 LAB
ALBUMIN SERPL BCG-MCNC: 4.4 G/DL (ref 3.5–5.2)
ALP SERPL-CCNC: 74 U/L (ref 40–150)
ALT SERPL W P-5'-P-CCNC: 13 U/L (ref 0–50)
ANION GAP SERPL CALCULATED.3IONS-SCNC: 9 MMOL/L (ref 7–15)
AST SERPL W P-5'-P-CCNC: 16 U/L (ref 0–45)
BILIRUB SERPL-MCNC: 0.9 MG/DL
BUN SERPL-MCNC: 14 MG/DL (ref 8–23)
CALCIUM SERPL-MCNC: 10 MG/DL (ref 8.8–10.4)
CHLORIDE SERPL-SCNC: 103 MMOL/L (ref 98–107)
CHOLEST SERPL-MCNC: 204 MG/DL
CREAT SERPL-MCNC: 0.81 MG/DL (ref 0.51–0.95)
EGFRCR SERPLBLD CKD-EPI 2021: 81 ML/MIN/1.73M2
ERYTHROCYTE [DISTWIDTH] IN BLOOD BY AUTOMATED COUNT: 12.5 % (ref 10–15)
EST. AVERAGE GLUCOSE BLD GHB EST-MCNC: 114 MG/DL
FASTING STATUS PATIENT QL REPORTED: YES
FASTING STATUS PATIENT QL REPORTED: YES
GLUCOSE SERPL-MCNC: 104 MG/DL (ref 70–99)
HBA1C MFR BLD: 5.6 % (ref 0–5.6)
HCO3 SERPL-SCNC: 26 MMOL/L (ref 22–29)
HCT VFR BLD AUTO: 38.8 % (ref 35–47)
HDLC SERPL-MCNC: 59 MG/DL
HGB BLD-MCNC: 13.5 G/DL (ref 11.7–15.7)
LDLC SERPL CALC-MCNC: 124 MG/DL
MCH RBC QN AUTO: 30.2 PG (ref 26.5–33)
MCHC RBC AUTO-ENTMCNC: 34.8 G/DL (ref 31.5–36.5)
MCV RBC AUTO: 87 FL (ref 78–100)
NONHDLC SERPL-MCNC: 145 MG/DL
PLATELET # BLD AUTO: 278 10E3/UL (ref 150–450)
POTASSIUM SERPL-SCNC: 4.5 MMOL/L (ref 3.4–5.3)
PROT SERPL-MCNC: 7.1 G/DL (ref 6.4–8.3)
RBC # BLD AUTO: 4.47 10E6/UL (ref 3.8–5.2)
SODIUM SERPL-SCNC: 138 MMOL/L (ref 135–145)
TRIGL SERPL-MCNC: 107 MG/DL
WBC # BLD AUTO: 4.7 10E3/UL (ref 4–11)

## 2024-12-04 PROCEDURE — 80053 COMPREHEN METABOLIC PANEL: CPT

## 2024-12-04 PROCEDURE — 36415 COLL VENOUS BLD VENIPUNCTURE: CPT

## 2024-12-04 PROCEDURE — 99207 PR NO CHARGE NURSE ONLY: CPT

## 2024-12-04 PROCEDURE — 80061 LIPID PANEL: CPT

## 2024-12-04 PROCEDURE — 90480 ADMN SARSCOV2 VAC 1/ONLY CMP: CPT

## 2024-12-04 PROCEDURE — 83036 HEMOGLOBIN GLYCOSYLATED A1C: CPT

## 2024-12-04 PROCEDURE — 91320 SARSCV2 VAC 30MCG TRS-SUC IM: CPT

## 2024-12-04 PROCEDURE — 90471 IMMUNIZATION ADMIN: CPT

## 2024-12-04 PROCEDURE — 90673 RIV3 VACCINE NO PRESERV IM: CPT

## 2024-12-04 PROCEDURE — 85027 COMPLETE CBC AUTOMATED: CPT

## 2024-12-04 NOTE — PROGRESS NOTES
Prior to immunization administration, verified patients identity using patient s name and date of birth. Please see Immunization Activity for additional information.     Is the patient's temperature normal (100.5 or less)? Yes     Patient MEETS CRITERIA. PROCEED with vaccine administration.        12/4/2024   General Questionnaire    Do you have any questions for your care team about the vaccines you will be receiving today? no                12/4/2024   COVID   Have you had myocarditis or pericarditis (inflammation of or around the heart muscle) after getting a COVID-19 vaccine? No   Have you had a serious reaction to a COVID vaccine or something in a COVID vaccine, like polyethylene glycol (PEG) or polysorbate? No   Have you had multisystem inflammatory syndrome from COVID-19 in the past 90 days? No   Have you received a bone marrow transplant within the previous 3 months? No            Patient MEETS CRITERIA. PROCEED with vaccine administration.            12/4/2024   INFLUENZA   Would you like to receive the flu shot or the nasal flu vaccine today? Flu Shot   Have you had a serious reaction to a flu vaccine or something in a flu vaccine? No   Have you had Guillain-Cardington syndrome within 6 weeks of getting a vaccine? No   Have you received a bone marrow transplant within the previous 6 months? No            Patient MEETS CRITERIA. PROCEED with vaccine administration.        Patient instructed to remain in clinic for 15 minutes afterwards, and to report any adverse reactions.      Link to Ancillary Visit Immunization Standing Orders SmartSet     Screening performed by Rowena Ramon MA on 12/4/2024 at 8:50 AM.

## 2024-12-09 DIAGNOSIS — I10 BENIGN ESSENTIAL HYPERTENSION: ICD-10-CM

## 2024-12-09 RX ORDER — LOSARTAN POTASSIUM 100 MG/1
TABLET ORAL
Qty: 90 TABLET | Refills: 0 | Status: SHIPPED | OUTPATIENT
Start: 2024-12-09

## 2024-12-09 NOTE — TELEPHONE ENCOUNTER
Prescription approved per [facility] Refill Protocol.  Molly Garcia RN  [facility]

## 2025-01-26 ENCOUNTER — HEALTH MAINTENANCE LETTER (OUTPATIENT)
Age: 65
End: 2025-01-26

## 2025-02-18 ENCOUNTER — VIRTUAL VISIT (OUTPATIENT)
Dept: FAMILY MEDICINE | Facility: CLINIC | Age: 65
End: 2025-02-18
Payer: COMMERCIAL

## 2025-02-18 DIAGNOSIS — R73.03 PRE-DIABETES: ICD-10-CM

## 2025-02-18 DIAGNOSIS — C50.919 MALIGNANT NEOPLASM OF FEMALE BREAST, UNSPECIFIED ESTROGEN RECEPTOR STATUS, UNSPECIFIED LATERALITY, UNSPECIFIED SITE OF BREAST (H): ICD-10-CM

## 2025-02-18 DIAGNOSIS — I10 BENIGN ESSENTIAL HYPERTENSION: ICD-10-CM

## 2025-02-18 DIAGNOSIS — E78.5 HYPERLIPIDEMIA LDL GOAL <130: ICD-10-CM

## 2025-02-18 PROCEDURE — 98006 SYNCH AUDIO-VIDEO EST MOD 30: CPT | Performed by: FAMILY MEDICINE

## 2025-02-18 NOTE — PROGRESS NOTES
Melina is a 64 year old who is being evaluated via a billable video visit.    What phone number would you like to be contacted at? [phone]   How would you like to obtain your AVS? MyChart      Assessment & Plan     Malignant neoplasm of female breast, unspecified estrogen receptor status, unspecified laterality, unspecified site of breast (H)  Seeing oncology     BMI 35.0-35.9,adult  Improving gradually, will increase the dose of Semaglutide from 1mg to 1.5mg next term  - COMPOUNDED NON-CONTROLLED SUBSTANCE (CMPD RX) - PHARMACY TO MIX COMPOUNDED MEDICATION; Semaglutide 1.5 mg subcutaneous injection every 7 days    Benign essential hypertension  Has been stable with current regimen and life style modification   Will keep monitoring   - COMPOUNDED NON-CONTROLLED SUBSTANCE (CMPD RX) - PHARMACY TO MIX COMPOUNDED MEDICATION; Semaglutide 1.5 mg subcutaneous injection every 7 days    Pre-diabetes  Stable   - COMPOUNDED NON-CONTROLLED SUBSTANCE (CMPD RX) - PHARMACY TO MIX COMPOUNDED MEDICATION; Semaglutide 1.5 mg subcutaneous injection every 7 days    Hyperlipidemia LDL goal <130  Stable   Keep monitoring   Will review the lab and update pt  - COMPOUNDED NON-CONTROLLED SUBSTANCE (CMPD RX) - PHARMACY TO MIX COMPOUNDED MEDICATION; Semaglutide 1.5 mg subcutaneous injection every 7 days            FUTURE APPOINTMENTS:       - Follow-up visit in 3 months     Subjective   Melina is a 64 year old, presenting for the following health issues:  Refill Request and Recheck Medication    History of Present Illness       Reason for visit:  Discuss medication and request refill    She eats 0-1 servings of fruits and vegetables daily.She consumes 0 sweetened beverage(s) daily.She exercises with enough effort to increase her heart rate 20 to 29 minutes per day.  She exercises with enough effort to increase her heart rate 3 or less days per week.   She is taking medications regularly.          Review of Systems  Constitutional, HEENT,  "cardiovascular, pulmonary, GI, , musculoskeletal, neuro, skin, endocrine and psych systems are negative, except as otherwise noted.      Objective    Vitals - Patient Reported  Weight (Patient Reported): 74.8 kg (165 lb)  Height (Patient Reported): 157.5 cm (5' 2\")  BMI (Based on Pt Reported Ht/Wt): 30.18  Pain Score: No Pain (0)        Physical Exam   GENERAL: alert and no distress  EYES: Eyes grossly normal to inspection.  No discharge or erythema, or obvious scleral/conjunctival abnormalities.  RESP: No audible wheeze, cough, or visible cyanosis.    SKIN: Visible skin clear. No significant rash, abnormal pigmentation or lesions.  NEURO: Cranial nerves grossly intact.  Mentation and speech appropriate for age.  PSYCH: Appropriate affect, tone, and pace of words          Video-Visit Details    Type of service:  Video Visit   Originating Location (pt. Location): Home    Distant Location (provider location):  On-site  Platform used for Video Visit: Renetta  Signed Electronically by: Dilan Soto MD    "

## 2025-03-05 DIAGNOSIS — I10 BENIGN ESSENTIAL HYPERTENSION: ICD-10-CM

## 2025-03-05 RX ORDER — LOSARTAN POTASSIUM 100 MG/1
TABLET ORAL
Qty: 90 TABLET | Refills: 0 | Status: SHIPPED | OUTPATIENT
Start: 2025-03-05

## 2025-03-08 DIAGNOSIS — I10 BENIGN ESSENTIAL HYPERTENSION: ICD-10-CM

## 2025-03-10 RX ORDER — CARVEDILOL 25 MG/1
TABLET ORAL
Qty: 90 TABLET | Refills: 1 | Status: SHIPPED | OUTPATIENT
Start: 2025-03-10

## 2025-03-13 ENCOUNTER — MYC MEDICAL ADVICE (OUTPATIENT)
Dept: FAMILY MEDICINE | Facility: CLINIC | Age: 65
End: 2025-03-13
Payer: COMMERCIAL

## 2025-03-13 DIAGNOSIS — I10 BENIGN ESSENTIAL HYPERTENSION: ICD-10-CM

## 2025-03-13 DIAGNOSIS — R73.03 PRE-DIABETES: ICD-10-CM

## 2025-03-13 DIAGNOSIS — E78.5 HYPERLIPIDEMIA LDL GOAL <130: ICD-10-CM

## 2025-03-14 NOTE — TELEPHONE ENCOUNTER
Please review and advise. This change is on active medication list but listed as historical, the increase was discussed at the last visit. Please review and send prescription if appropriate.     Sherry Anne RN

## 2025-04-11 ENCOUNTER — DOCUMENTATION ONLY (OUTPATIENT)
Dept: FAMILY MEDICINE | Facility: CLINIC | Age: 65
End: 2025-04-11
Payer: COMMERCIAL

## 2025-04-11 DIAGNOSIS — R73.03 PRE-DIABETES: ICD-10-CM

## 2025-04-11 DIAGNOSIS — I10 BENIGN ESSENTIAL HYPERTENSION: ICD-10-CM

## 2025-04-11 DIAGNOSIS — E78.5 HYPERLIPIDEMIA LDL GOAL <130: ICD-10-CM

## 2025-04-11 NOTE — PROGRESS NOTES
Theresa M Delosreyes has an upcoming lab appointment:    Future Appointments   Date Time Provider Department Center   4/22/2025  9:30 AM [facility]   4/24/2025 10:00 AM Dilan Soto MD [facility]     Patient is scheduled for the following lab(s): pre-appt labs    There is no order available. Please review and place either future orders or HMPO (Review of Health Maintenance Protocol Orders), as appropriate.    There are no preventive care reminders to display for this patient.  Claire Raymond

## 2025-04-22 ENCOUNTER — LAB (OUTPATIENT)
Dept: LAB | Facility: CLINIC | Age: 65
End: 2025-04-22
Payer: COMMERCIAL

## 2025-04-22 DIAGNOSIS — E78.5 HYPERLIPIDEMIA LDL GOAL <130: ICD-10-CM

## 2025-04-22 DIAGNOSIS — R73.03 PRE-DIABETES: ICD-10-CM

## 2025-04-22 DIAGNOSIS — I10 BENIGN ESSENTIAL HYPERTENSION: ICD-10-CM

## 2025-04-22 LAB
ANION GAP SERPL CALCULATED.3IONS-SCNC: 9 MMOL/L (ref 7–15)
BUN SERPL-MCNC: 13.9 MG/DL (ref 8–23)
CALCIUM SERPL-MCNC: 9.8 MG/DL (ref 8.8–10.4)
CHLORIDE SERPL-SCNC: 101 MMOL/L (ref 98–107)
CREAT SERPL-MCNC: 0.81 MG/DL (ref 0.51–0.95)
EGFRCR SERPLBLD CKD-EPI 2021: 81 ML/MIN/1.73M2
EST. AVERAGE GLUCOSE BLD GHB EST-MCNC: 103 MG/DL
GLUCOSE SERPL-MCNC: 98 MG/DL (ref 70–99)
HBA1C MFR BLD: 5.2 % (ref 0–5.6)
HCO3 SERPL-SCNC: 29 MMOL/L (ref 22–29)
LDLC SERPL DIRECT ASSAY-MCNC: 133 MG/DL
POTASSIUM SERPL-SCNC: 4.4 MMOL/L (ref 3.4–5.3)
SODIUM SERPL-SCNC: 139 MMOL/L (ref 135–145)

## 2025-04-22 PROCEDURE — 36415 COLL VENOUS BLD VENIPUNCTURE: CPT

## 2025-04-22 PROCEDURE — 80048 BASIC METABOLIC PNL TOTAL CA: CPT

## 2025-04-22 PROCEDURE — 83036 HEMOGLOBIN GLYCOSYLATED A1C: CPT

## 2025-04-22 PROCEDURE — 83721 ASSAY OF BLOOD LIPOPROTEIN: CPT

## 2025-04-24 ENCOUNTER — OFFICE VISIT (OUTPATIENT)
Dept: FAMILY MEDICINE | Facility: CLINIC | Age: 65
End: 2025-04-24
Payer: COMMERCIAL

## 2025-04-24 VITALS
BODY MASS INDEX: 30 KG/M2 | HEIGHT: 62 IN | SYSTOLIC BLOOD PRESSURE: 120 MMHG | WEIGHT: 163 LBS | HEART RATE: 78 BPM | OXYGEN SATURATION: 100 % | DIASTOLIC BLOOD PRESSURE: 66 MMHG | TEMPERATURE: 97.4 F

## 2025-04-24 DIAGNOSIS — R63.5 ABNORMAL WEIGHT GAIN: ICD-10-CM

## 2025-04-24 DIAGNOSIS — E66.9 OBESITY (BMI 30-39.9): ICD-10-CM

## 2025-04-24 DIAGNOSIS — E78.5 HYPERLIPIDEMIA LDL GOAL <130: Primary | ICD-10-CM

## 2025-04-24 DIAGNOSIS — I10 HYPERTENSION GOAL BP (BLOOD PRESSURE) < 140/90: ICD-10-CM

## 2025-04-24 DIAGNOSIS — I10 BENIGN ESSENTIAL HYPERTENSION: ICD-10-CM

## 2025-04-24 DIAGNOSIS — G47.33 OSA (OBSTRUCTIVE SLEEP APNEA): ICD-10-CM

## 2025-04-24 DIAGNOSIS — R73.03 PRE-DIABETES: ICD-10-CM

## 2025-04-24 DIAGNOSIS — C50.919 MALIGNANT NEOPLASM OF FEMALE BREAST, UNSPECIFIED ESTROGEN RECEPTOR STATUS, UNSPECIFIED LATERALITY, UNSPECIFIED SITE OF BREAST (H): ICD-10-CM

## 2025-04-24 RX ORDER — TOPIRAMATE 25 MG/1
25 TABLET, FILM COATED ORAL 2 TIMES DAILY
Qty: 60 TABLET | Refills: 2 | Status: CANCELLED | OUTPATIENT
Start: 2025-04-24

## 2025-04-24 ASSESSMENT — PAIN SCALES - GENERAL: PAINLEVEL_OUTOF10: NO PAIN (0)

## 2025-04-24 NOTE — PROGRESS NOTES
"  Assessment & Plan     Hyperlipidemia LDL goal <130  Stable   Keep monitoring   Will review the lab and update pt  - COMPOUNDED NON-CONTROLLED SUBSTANCE (CMPD RX) - PHARMACY TO MIX COMPOUNDED MEDICATION; Semaglutide 1.7 mg subcutaneous injection every 7 days    Hypertension goal BP (blood pressure) < 140/90  Stable   Keep monitoring   Will review the lab and update pt    Obesity (BMI 30-39.9)  Improving, encouraged her to continue monitoring     BRIT (obstructive sleep apnea)  stable    Malignant neoplasm of female breast, unspecified estrogen receptor status, unspecified laterality, unspecified site of breast (H)  Seeing oncology     BMI 35.0-35.9,adult  Improving, will increase the dose of GLP-1 and recheck in 2-3 months   - COMPOUNDED NON-CONTROLLED SUBSTANCE (CMPD RX) - PHARMACY TO MIX COMPOUNDED MEDICATION; Semaglutide 1.7 mg subcutaneous injection every 7 days    Benign essential hypertension  Stable   Keep monitoring   Will review the lab and update pt  - COMPOUNDED NON-CONTROLLED SUBSTANCE (CMPD RX) - PHARMACY TO MIX COMPOUNDED MEDICATION; Semaglutide 1.7 mg subcutaneous injection every 7 days    Pre-diabetes  Stable   - COMPOUNDED NON-CONTROLLED SUBSTANCE (CMPD RX) - PHARMACY TO MIX COMPOUNDED MEDICATION; Semaglutide 1.7 mg subcutaneous injection every 7 days    Abnormal weight gain  Improving       BMI  Estimated body mass index is 29.84 kg/m  as calculated from the following:    Height as of this encounter: 1.574 m (5' 1.97\").    Weight as of this encounter: 73.9 kg (163 lb).   Weight management plan: Discussed healthy diet and exercise guidelines      Follow-up    Follow-up Visit   Expected date:  Jul 24, 2025 (Approximate)      Follow Up Appointment Details:     Follow-up with whom?: Me    Follow-Up for what?: Other (Office Visit)    Additional Details: weight loss treatment    How?: Video                 Subjective   Melina is a 64 year old, presenting for the following health issues:  Diabetes, " "Lipids, and Results        4/24/2025     9:48 AM   Additional Questions   Roomed by Alejandra HENRY   Accompanied by Spouse - Taj & grandson     History of Present Illness       Reason for visit:  Check up    She eats 0-1 servings of fruits and vegetables daily.She consumes 0 sweetened beverage(s) daily.She exercises with enough effort to increase her heart rate 20 to 29 minutes per day.  She exercises with enough effort to increase her heart rate 3 or less days per week.   She is taking medications regularly.      Pt is here to go over lab results from 4/22/25        Review of Systems  CONSTITUTIONAL: NEGATIVE for fever, chills, change in weight  ENT/MOUTH: NEGATIVE for ear, mouth and throat problems  RESP: NEGATIVE for significant cough or SOB  CV: NEGATIVE for chest pain, palpitations or peripheral edema      Objective    /66 (BP Location: Left arm, Patient Position: Sitting, Cuff Size: Adult Regular)   Pulse 78   Temp 97.4  F (36.3  C) (Tympanic)   Ht 1.574 m (5' 1.97\")   Wt 73.9 kg (163 lb)   LMP 02/23/2013 (Approximate)   SpO2 100%   BMI 29.84 kg/m    Body mass index is 29.84 kg/m .  Physical Exam   GENERAL: alert and no distress  NECK: no adenopathy, no asymmetry, masses, or scars  RESP: lungs clear to auscultation - no rales, rhonchi or wheezes  CV: regular rate and rhythm, normal S1 S2, no S3 or S4, no murmur, click or rub, no peripheral edema  ABDOMEN: soft, nontender, no hepatosplenomegaly, no masses and bowel sounds normal  MS: no gross musculoskeletal defects noted, no edema    The longitudinal plan of care for the diagnosis(es)/condition(s) as documented were addressed during this visit. Due to the added complexity in care, I will continue to support Melina in the subsequent management and with ongoing continuity of care.          Signed Electronically by: Dilan Soto MD    "

## 2025-05-31 DIAGNOSIS — I10 BENIGN ESSENTIAL HYPERTENSION: ICD-10-CM

## 2025-06-02 ENCOUNTER — VIRTUAL VISIT (OUTPATIENT)
Dept: FAMILY MEDICINE | Facility: CLINIC | Age: 65
End: 2025-06-02
Payer: COMMERCIAL

## 2025-06-02 DIAGNOSIS — I10 BENIGN ESSENTIAL HYPERTENSION: ICD-10-CM

## 2025-06-02 DIAGNOSIS — E66.9 OBESITY (BMI 30-39.9): Primary | ICD-10-CM

## 2025-06-02 DIAGNOSIS — C50.919 MALIGNANT NEOPLASM OF FEMALE BREAST, UNSPECIFIED ESTROGEN RECEPTOR STATUS, UNSPECIFIED LATERALITY, UNSPECIFIED SITE OF BREAST (H): ICD-10-CM

## 2025-06-02 DIAGNOSIS — E78.5 HYPERLIPIDEMIA LDL GOAL <130: ICD-10-CM

## 2025-06-02 DIAGNOSIS — G47.33 OSA (OBSTRUCTIVE SLEEP APNEA): ICD-10-CM

## 2025-06-02 DIAGNOSIS — C50.911 MALIGNANT NEOPLASM OF RIGHT FEMALE BREAST, UNSPECIFIED ESTROGEN RECEPTOR STATUS, UNSPECIFIED SITE OF BREAST (H): ICD-10-CM

## 2025-06-02 PROCEDURE — 98006 SYNCH AUDIO-VIDEO EST MOD 30: CPT | Performed by: FAMILY MEDICINE

## 2025-06-02 RX ORDER — LOSARTAN POTASSIUM 100 MG/1
100 TABLET ORAL
Qty: 90 TABLET | Refills: 0 | OUTPATIENT
Start: 2025-06-02

## 2025-06-02 RX ORDER — LOSARTAN POTASSIUM 100 MG/1
100 TABLET ORAL DAILY
Qty: 90 TABLET | Refills: 3 | Status: SHIPPED | OUTPATIENT
Start: 2025-06-02

## 2025-06-02 RX ORDER — CARVEDILOL 25 MG/1
TABLET ORAL
Qty: 90 TABLET | Refills: 3 | Status: SHIPPED | OUTPATIENT
Start: 2025-06-02

## 2025-06-02 NOTE — PROGRESS NOTES
Melina is a 64 year old who is being evaluated via a billable video visit.    How would you like to obtain your AVS? MyChart  If the video visit is dropped, the invitation should be resent by: Text to cell phone: [phone]  Will anyone else be joining your video visit? No      Assessment & Plan     Benign essential hypertension  Stable   Keep monitoring   Will review the lab and update pt  - semaglutide 2 mg/mL sublingual suspension (compounded); Place 1 mL (2 mg) under the tongue daily. Place under the tongue for 60-90 seconds. Shake Well.  - carvedilol (COREG) 25 MG tablet; TAKE 1 TABLET BY MOUTH DAILY AS DIRECTED  - losartan (COZAAR) 100 MG tablet; Take 1 tablet (100 mg) by mouth daily.    Obesity (BMI 30-39.9)  Not improving, will have her to switch to SL from due to GLP-1 generic medicine resatriction   - semaglutide 2 mg/mL sublingual suspension (compounded); Place 1 mL (2 mg) under the tongue daily. Place under the tongue for 60-90 seconds. Shake Well.    Hyperlipidemia LDL goal <130  Stable   Keep monitoring   Will review the lab and update pt  - semaglutide 2 mg/mL sublingual suspension (compounded); Place 1 mL (2 mg) under the tongue daily. Place under the tongue for 60-90 seconds. Shake Well.    BRIT (obstructive sleep apnea)    - semaglutide 2 mg/mL sublingual suspension (compounded); Place 1 mL (2 mg) under the tongue daily. Place under the tongue for 60-90 seconds. Shake Well.    BMI 35.0-35.9,adult  Mentioned above   - semaglutide 2 mg/mL sublingual suspension (compounded); Place 1 mL (2 mg) under the tongue daily. Place under the tongue for 60-90 seconds. Shake Well.    Malignant neoplasm of right female breast, unspecified estrogen receptor status, unspecified site of breast (H)  Seeing oncology   - semaglutide 2 mg/mL sublingual suspension (compounded); Place 1 mL (2 mg) under the tongue daily. Place under the tongue for 60-90 seconds. Shake Well.    Malignant neoplasm of female breast,  unspecified estrogen receptor status, unspecified laterality, unspecified site of breast (H)  Mentioned above   - semaglutide 2 mg/mL sublingual suspension (compounded); Place 1 mL (2 mg) under the tongue daily. Place under the tongue for 60-90 seconds. Shake Well.                Subjective   Melina is a 64 year old, presenting for the following health issues:  Medication Follow-up (BP and weight loss management. )        6/2/2025    11:02 AM   Additional Questions   Roomed by Bonny DUDLEY- Unable to reach     History of Present Illness       Reason for visit:  Discuss medications   She is taking medications regularly.        Weight Follow-up    How often are you checking your blood sugar? Not at all  What concerns do you have today about your diabetes? None   Do you have any of these symptoms? (Select all that apply)  Numbness in feet  Have you had a diabetic eye exam in the last 12 months? No        BP Readings from Last 2 Encounters:   04/24/25 120/66   10/24/24 122/66     Hemoglobin A1C (%)   Date Value   04/22/2025 5.2   12/04/2024 5.6     LDL Cholesterol Calculated (mg/dL)   Date Value   12/04/2024 124 (H)   10/13/2023 113 (H)   08/24/2020 127 (H)   06/17/2019 104 (H)     LDL Cholesterol Direct (mg/dL)   Date Value   04/22/2025 133 (H)   04/16/2024 127 (H)   04/29/2022 140 (H)             Hypertension Follow-up    Do you check your blood pressure regularly outside of the clinic? Yes   Are you following a low salt diet? Yes  Are your blood pressures ever more than 140 on the top number (systolic) OR more   than 90 on the bottom number (diastolic), for example 140/90? Yes    BP Readings from Last 2 Encounters:   04/24/25 120/66   10/24/24 122/66     Medication Followup of GLP-1  Taking Medication as prescribed: yes  Side Effects:  None  Medication Helping Symptoms:  yes        Review of Systems  Constitutional, HEENT, cardiovascular, pulmonary, GI, , musculoskeletal, neuro, skin, endocrine and psych systems are  negative, except as otherwise noted.      Objective           Vitals:  No vitals were obtained today due to virtual visit.    Physical Exam   GENERAL: alert and no distress  EYES: Eyes grossly normal to inspection.  No discharge or erythema, or obvious scleral/conjunctival abnormalities.  RESP: No audible wheeze, cough, or visible cyanosis.    SKIN: Visible skin clear. No significant rash, abnormal pigmentation or lesions.  NEURO: Cranial nerves grossly intact.  Mentation and speech appropriate for age.  PSYCH: Appropriate affect, tone, and pace of words          Video-Visit Details    Type of service:  Video Visit   Originating Location (pt. Location): Home    Distant Location (provider location):  On-site  Platform used for Video Visit: Renetta  Signed Electronically by: Dilan Soto MD

## 2025-08-18 DIAGNOSIS — R73.03 PRE-DIABETES: ICD-10-CM

## 2025-08-18 DIAGNOSIS — I10 BENIGN ESSENTIAL HYPERTENSION: ICD-10-CM

## 2025-08-18 RX ORDER — METFORMIN HYDROCHLORIDE 500 MG/1
500 TABLET, EXTENDED RELEASE ORAL
Qty: 90 TABLET | Refills: 0 | Status: SHIPPED | OUTPATIENT
Start: 2025-08-18

## (undated) DEVICE — SU VICRYL 4-0 PS-2 18" UND J496H

## (undated) DEVICE — COLLECTION DEVICE SYSTEM TISSUE REVOLVE RV0001 2 PACK RV0002

## (undated) DEVICE — BLADE KNIFE SURG 15 371115

## (undated) DEVICE — DRSG ABDOMINAL 07 1/2X8" 7197D

## (undated) DEVICE — LINEN TOWEL PACK X5 5464

## (undated) DEVICE — SOL RINGERS LACTATED 1000ML BAG 2B2324X

## (undated) DEVICE — TUBING INFUSION INFILTRATION LIPOSUCTION 156" 24-6008

## (undated) DEVICE — BNDG ABDOMINAL BINDER 9X45-62" 79-89071

## (undated) DEVICE — SU VICRYL 2-0 CT-1 27" UND J259H

## (undated) DEVICE — PREP SKIN SCRUB TRAY 4461A

## (undated) DEVICE — PACK MAJOR SBA15MAFSI

## (undated) DEVICE — SOL WATER IRRIG 1000ML BOTTLE 2F7114

## (undated) DEVICE — SUCTION CANISTER MEDIVAC LINER 3000ML W/LID 65651-530

## (undated) DEVICE — SYR 50ML CATH TIP W/O NDL 309620

## (undated) DEVICE — TUBING SUCTION LIPECTOMY

## (undated) DEVICE — SU VICRYL 3-0 PS-1 18" UND J683

## (undated) DEVICE — DRAPE BREAST/CHEST 29420

## (undated) DEVICE — SOL NACL 0.9% IRRIG 1000ML BOTTLE 07138-09

## (undated) DEVICE — ESU PENCIL W/SMOKE EVAC NEPTUNE STRYKER 0703-046-000

## (undated) DEVICE — GLOVE PROTEXIS W/NEU-THERA 7.5  2D73TE75

## (undated) DEVICE — NDL SPINAL 22GA 3.5" QUINCKE 405181

## (undated) DEVICE — PAD CHUX UNDERPAD 23X24" 7136

## (undated) DEVICE — DRSG KERLIX 4 1/2"X4YDS ROLL 6715

## (undated) DEVICE — NDL 19GA 1.5"

## (undated) RX ORDER — HYDROMORPHONE HYDROCHLORIDE 1 MG/ML
INJECTION, SOLUTION INTRAMUSCULAR; INTRAVENOUS; SUBCUTANEOUS
Status: DISPENSED
Start: 2018-12-14

## (undated) RX ORDER — CLINDAMYCIN PHOSPHATE 900 MG/50ML
INJECTION, SOLUTION INTRAVENOUS
Status: DISPENSED
Start: 2018-12-14

## (undated) RX ORDER — SCOLOPAMINE TRANSDERMAL SYSTEM 1 MG/1
PATCH, EXTENDED RELEASE TRANSDERMAL
Status: DISPENSED
Start: 2018-12-14

## (undated) RX ORDER — OXYCODONE AND ACETAMINOPHEN 5; 325 MG/1; MG/1
TABLET ORAL
Status: DISPENSED
Start: 2018-12-14

## (undated) RX ORDER — FENTANYL CITRATE 50 UG/ML
INJECTION, SOLUTION INTRAMUSCULAR; INTRAVENOUS
Status: DISPENSED
Start: 2020-10-12

## (undated) RX ORDER — PROPOFOL 10 MG/ML
INJECTION, EMULSION INTRAVENOUS
Status: DISPENSED
Start: 2018-12-14

## (undated) RX ORDER — ONDANSETRON 2 MG/ML
INJECTION INTRAMUSCULAR; INTRAVENOUS
Status: DISPENSED
Start: 2018-12-14

## (undated) RX ORDER — LIDOCAINE HYDROCHLORIDE 10 MG/ML
INJECTION, SOLUTION EPIDURAL; INFILTRATION; INTRACAUDAL; PERINEURAL
Status: DISPENSED
Start: 2018-12-14

## (undated) RX ORDER — DIPHENHYDRAMINE HYDROCHLORIDE 50 MG/ML
INJECTION INTRAMUSCULAR; INTRAVENOUS
Status: DISPENSED
Start: 2018-12-14

## (undated) RX ORDER — LIDOCAINE HYDROCHLORIDE 20 MG/ML
INJECTION, SOLUTION EPIDURAL; INFILTRATION; INTRACAUDAL; PERINEURAL
Status: DISPENSED
Start: 2018-12-14

## (undated) RX ORDER — DEXAMETHASONE SODIUM PHOSPHATE 4 MG/ML
INJECTION, SOLUTION INTRA-ARTICULAR; INTRALESIONAL; INTRAMUSCULAR; INTRAVENOUS; SOFT TISSUE
Status: DISPENSED
Start: 2018-12-14

## (undated) RX ORDER — FENTANYL CITRATE 50 UG/ML
INJECTION, SOLUTION INTRAMUSCULAR; INTRAVENOUS
Status: DISPENSED
Start: 2018-12-14